# Patient Record
Sex: MALE | Race: WHITE | NOT HISPANIC OR LATINO | Employment: OTHER | URBAN - METROPOLITAN AREA
[De-identification: names, ages, dates, MRNs, and addresses within clinical notes are randomized per-mention and may not be internally consistent; named-entity substitution may affect disease eponyms.]

---

## 2020-09-05 ENCOUNTER — HOSPITAL ENCOUNTER (EMERGENCY)
Facility: HOSPITAL | Age: 65
Discharge: HOME | End: 2020-09-05
Attending: EMERGENCY MEDICINE | Admitting: EMERGENCY MEDICINE

## 2020-09-05 ENCOUNTER — APPOINTMENT (OUTPATIENT)
Dept: CT IMAGING | Facility: HOSPITAL | Age: 65
End: 2020-09-05

## 2020-09-05 VITALS
RESPIRATION RATE: 16 BRPM | DIASTOLIC BLOOD PRESSURE: 107 MMHG | HEART RATE: 82 BPM | TEMPERATURE: 97.7 F | SYSTOLIC BLOOD PRESSURE: 148 MMHG | HEIGHT: 72 IN | WEIGHT: 170 LBS | OXYGEN SATURATION: 95 % | BODY MASS INDEX: 23.03 KG/M2

## 2020-09-05 DIAGNOSIS — N20.1 RIGHT URETERAL STONE: Primary | ICD-10-CM

## 2020-09-05 LAB
ALBUMIN SERPL-MCNC: 4.4 G/DL (ref 3.5–5.2)
ALBUMIN/GLOB SERPL: 1.4 G/DL
ALP SERPL-CCNC: 69 U/L (ref 39–117)
ALT SERPL W P-5'-P-CCNC: 15 U/L (ref 1–41)
ANION GAP SERPL CALCULATED.3IONS-SCNC: 11.3 MMOL/L (ref 5–15)
AST SERPL-CCNC: 15 U/L (ref 1–40)
BACTERIA UR QL AUTO: ABNORMAL /HPF
BASOPHILS # BLD AUTO: 0.03 10*3/MM3 (ref 0–0.2)
BASOPHILS NFR BLD AUTO: 0.2 % (ref 0–1.5)
BILIRUB SERPL-MCNC: 0.6 MG/DL (ref 0–1.2)
BILIRUB UR QL STRIP: NEGATIVE
BUN SERPL-MCNC: 15 MG/DL (ref 8–23)
BUN/CREAT SERPL: 12.2 (ref 7–25)
CALCIUM SPEC-SCNC: 9.7 MG/DL (ref 8.6–10.5)
CHLORIDE SERPL-SCNC: 101 MMOL/L (ref 98–107)
CLARITY UR: CLEAR
CO2 SERPL-SCNC: 25.7 MMOL/L (ref 22–29)
COLOR UR: ABNORMAL
CREAT SERPL-MCNC: 1.23 MG/DL (ref 0.76–1.27)
DEPRECATED RDW RBC AUTO: 46.1 FL (ref 37–54)
EOSINOPHIL # BLD AUTO: 0.11 10*3/MM3 (ref 0–0.4)
EOSINOPHIL NFR BLD AUTO: 0.8 % (ref 0.3–6.2)
ERYTHROCYTE [DISTWIDTH] IN BLOOD BY AUTOMATED COUNT: 13.4 % (ref 12.3–15.4)
GFR SERPL CREATININE-BSD FRML MDRD: 59 ML/MIN/1.73
GFR SERPL CREATININE-BSD FRML MDRD: 72 ML/MIN/1.73
GLOBULIN UR ELPH-MCNC: 3.2 GM/DL
GLUCOSE SERPL-MCNC: 172 MG/DL (ref 65–99)
GLUCOSE UR STRIP-MCNC: NEGATIVE MG/DL
HCT VFR BLD AUTO: 45.2 % (ref 37.5–51)
HGB BLD-MCNC: 14.7 G/DL (ref 13–17.7)
HGB UR QL STRIP.AUTO: ABNORMAL
HYALINE CASTS UR QL AUTO: ABNORMAL /LPF
IMM GRANULOCYTES # BLD AUTO: 0.12 10*3/MM3 (ref 0–0.05)
IMM GRANULOCYTES NFR BLD AUTO: 0.9 % (ref 0–0.5)
KETONES UR QL STRIP: ABNORMAL
LEUKOCYTE ESTERASE UR QL STRIP.AUTO: ABNORMAL
LIPASE SERPL-CCNC: 28 U/L (ref 13–60)
LYMPHOCYTES # BLD AUTO: 2.15 10*3/MM3 (ref 0.7–3.1)
LYMPHOCYTES NFR BLD AUTO: 15.7 % (ref 19.6–45.3)
MCH RBC QN AUTO: 29.8 PG (ref 26.6–33)
MCHC RBC AUTO-ENTMCNC: 32.5 G/DL (ref 31.5–35.7)
MCV RBC AUTO: 91.7 FL (ref 79–97)
MONOCYTES # BLD AUTO: 0.69 10*3/MM3 (ref 0.1–0.9)
MONOCYTES NFR BLD AUTO: 5 % (ref 5–12)
NEUTROPHILS NFR BLD AUTO: 10.6 10*3/MM3 (ref 1.7–7)
NEUTROPHILS NFR BLD AUTO: 77.4 % (ref 42.7–76)
NITRITE UR QL STRIP: NEGATIVE
NRBC BLD AUTO-RTO: 0 /100 WBC (ref 0–0.2)
PH UR STRIP.AUTO: <=5 [PH] (ref 5–8)
PLATELET # BLD AUTO: 448 10*3/MM3 (ref 140–450)
PMV BLD AUTO: 9.4 FL (ref 6–12)
POTASSIUM SERPL-SCNC: 4.1 MMOL/L (ref 3.5–5.2)
PROT SERPL-MCNC: 7.6 G/DL (ref 6–8.5)
PROT UR QL STRIP: NEGATIVE
RBC # BLD AUTO: 4.93 10*6/MM3 (ref 4.14–5.8)
RBC # UR: ABNORMAL /HPF
REF LAB TEST METHOD: ABNORMAL
SODIUM SERPL-SCNC: 138 MMOL/L (ref 136–145)
SP GR UR STRIP: 1.03 (ref 1–1.03)
SQUAMOUS #/AREA URNS HPF: ABNORMAL /HPF
UROBILINOGEN UR QL STRIP: ABNORMAL
WBC # BLD AUTO: 13.7 10*3/MM3 (ref 3.4–10.8)
WBC UR QL AUTO: ABNORMAL /HPF

## 2020-09-05 PROCEDURE — 85025 COMPLETE CBC W/AUTO DIFF WBC: CPT | Performed by: EMERGENCY MEDICINE

## 2020-09-05 PROCEDURE — 81001 URINALYSIS AUTO W/SCOPE: CPT | Performed by: EMERGENCY MEDICINE

## 2020-09-05 PROCEDURE — 80053 COMPREHEN METABOLIC PANEL: CPT | Performed by: EMERGENCY MEDICINE

## 2020-09-05 PROCEDURE — 25010000002 KETOROLAC TROMETHAMINE PER 15 MG: Performed by: EMERGENCY MEDICINE

## 2020-09-05 PROCEDURE — 96374 THER/PROPH/DIAG INJ IV PUSH: CPT

## 2020-09-05 PROCEDURE — 83690 ASSAY OF LIPASE: CPT | Performed by: EMERGENCY MEDICINE

## 2020-09-05 PROCEDURE — 74176 CT ABD & PELVIS W/O CONTRAST: CPT

## 2020-09-05 PROCEDURE — 96376 TX/PRO/DX INJ SAME DRUG ADON: CPT

## 2020-09-05 PROCEDURE — 96375 TX/PRO/DX INJ NEW DRUG ADDON: CPT

## 2020-09-05 PROCEDURE — 25010000002 HYDROMORPHONE 1 MG/ML SOLUTION: Performed by: EMERGENCY MEDICINE

## 2020-09-05 PROCEDURE — 99283 EMERGENCY DEPT VISIT LOW MDM: CPT

## 2020-09-05 RX ORDER — SODIUM CHLORIDE 0.9 % (FLUSH) 0.9 %
10 SYRINGE (ML) INJECTION AS NEEDED
Status: DISCONTINUED | OUTPATIENT
Start: 2020-09-05 | End: 2020-09-05 | Stop reason: HOSPADM

## 2020-09-05 RX ORDER — ONDANSETRON 4 MG/1
4 TABLET, ORALLY DISINTEGRATING ORAL EVERY 8 HOURS PRN
Qty: 15 TABLET | Refills: 0 | Status: SHIPPED | OUTPATIENT
Start: 2020-09-05 | End: 2020-09-10

## 2020-09-05 RX ORDER — HYDROCODONE BITARTRATE AND ACETAMINOPHEN 5; 325 MG/1; MG/1
1 TABLET ORAL EVERY 6 HOURS PRN
Qty: 12 TABLET | Refills: 0 | Status: SHIPPED | OUTPATIENT
Start: 2020-09-05 | End: 2020-09-08

## 2020-09-05 RX ORDER — KETOROLAC TROMETHAMINE 15 MG/ML
15 INJECTION, SOLUTION INTRAMUSCULAR; INTRAVENOUS ONCE
Status: COMPLETED | OUTPATIENT
Start: 2020-09-05 | End: 2020-09-05

## 2020-09-05 RX ADMIN — HYDROMORPHONE HYDROCHLORIDE 1 MG: 1 INJECTION, SOLUTION INTRAMUSCULAR; INTRAVENOUS; SUBCUTANEOUS at 10:11

## 2020-09-05 RX ADMIN — KETOROLAC TROMETHAMINE 15 MG: 15 INJECTION, SOLUTION INTRAMUSCULAR; INTRAVENOUS at 10:51

## 2020-09-05 RX ADMIN — SODIUM CHLORIDE, POTASSIUM CHLORIDE, SODIUM LACTATE AND CALCIUM CHLORIDE 1000 ML: 600; 310; 30; 20 INJECTION, SOLUTION INTRAVENOUS at 10:10

## 2020-09-05 RX ADMIN — HYDROMORPHONE HYDROCHLORIDE 1 MG: 1 INJECTION, SOLUTION INTRAMUSCULAR; INTRAVENOUS; SUBCUTANEOUS at 11:14

## 2020-09-05 NOTE — ED NOTES
Pt c/o RLQ abd pain that radiates to R flank a little starting at 0630. Hx of kidney stones. Able to urinate this morning. Denies n/v/d. Pt and visitor wearing masks. This RN wearing mask and safety glasses.       Carrie Blankenship, RN  09/05/20 6289

## 2020-09-05 NOTE — ED PROVIDER NOTES
EMERGENCY DEPARTMENT ENCOUNTER    Room Number:  10/10  Date of encounter:  9/5/2020  PCP: Provider, No Known  Historian: Patient, wife      HPI:  Chief Complaint: Flank pain  A complete HPI/ROS/PMH/PSH/SH/FH are unobtainable due to: None    Context: Brad Bella is a 65 y.o. male who presents to the ED c/o flank pain.  Onset 6:30 AM.  It is sharp and constant pain that is 9/10 in intensity.  It radiates into the right lower quadrant.  This feels just like his prior to kidney stones.  No fevers.  No urinary symptoms.  No cough, congestion, chest pain, shortness of breath.      PAST MEDICAL HISTORY  Active Ambulatory Problems     Diagnosis Date Noted   • No Active Ambulatory Problems     Resolved Ambulatory Problems     Diagnosis Date Noted   • No Resolved Ambulatory Problems     No Additional Past Medical History         PAST SURGICAL HISTORY  No past surgical history on file.      FAMILY HISTORY  No family history on file.      SOCIAL HISTORY  Social History     Socioeconomic History   • Marital status:      Spouse name: Not on file   • Number of children: Not on file   • Years of education: Not on file   • Highest education level: Not on file         ALLERGIES  Patient has no known allergies.        REVIEW OF SYSTEMS  Review of Systems     All systems reviewed and negative except for those discussed in HPI.       PHYSICAL EXAM    I have reviewed the triage vital signs and nursing notes.    ED Triage Vitals   Temp Heart Rate Resp BP SpO2   09/05/20 0945 09/05/20 0946 09/05/20 0945 -- 09/05/20 0946   97.7 °F (36.5 °C) 72 18  96 %      Temp src Heart Rate Source Patient Position BP Location FiO2 (%)   09/05/20 0945 09/05/20 0946 -- -- --   Tympanic Monitor          Physical Exam  GENERAL: Uncomfortable, nontoxic  HENT: nares patent  EYES: no scleral icterus  CV: regular rhythm, regular rate  RESPIRATORY: normal effort, clear to auscultation bilaterally  ABDOMEN: soft, nontender, no CVA  tenderness  MUSCULOSKELETAL: no deformity  NEURO: alert, moves all extremities, follows commands  SKIN: warm, dry, no rash to the abdomen or back        LAB RESULTS  Recent Results (from the past 24 hour(s))   Comprehensive Metabolic Panel    Collection Time: 09/05/20 10:03 AM   Result Value Ref Range    Glucose 172 (H) 65 - 99 mg/dL    BUN 15 8 - 23 mg/dL    Creatinine 1.23 0.76 - 1.27 mg/dL    Sodium 138 136 - 145 mmol/L    Potassium 4.1 3.5 - 5.2 mmol/L    Chloride 101 98 - 107 mmol/L    CO2 25.7 22.0 - 29.0 mmol/L    Calcium 9.7 8.6 - 10.5 mg/dL    Total Protein 7.6 6.0 - 8.5 g/dL    Albumin 4.40 3.50 - 5.20 g/dL    ALT (SGPT) 15 1 - 41 U/L    AST (SGOT) 15 1 - 40 U/L    Alkaline Phosphatase 69 39 - 117 U/L    Total Bilirubin 0.6 0.0 - 1.2 mg/dL    eGFR Non African Amer 59 (L) >60 mL/min/1.73    eGFR  African Amer 72 >60 mL/min/1.73    Globulin 3.2 gm/dL    A/G Ratio 1.4 g/dL    BUN/Creatinine Ratio 12.2 7.0 - 25.0    Anion Gap 11.3 5.0 - 15.0 mmol/L   Lipase    Collection Time: 09/05/20 10:03 AM   Result Value Ref Range    Lipase 28 13 - 60 U/L   CBC Auto Differential    Collection Time: 09/05/20 10:03 AM   Result Value Ref Range    WBC 13.70 (H) 3.40 - 10.80 10*3/mm3    RBC 4.93 4.14 - 5.80 10*6/mm3    Hemoglobin 14.7 13.0 - 17.7 g/dL    Hematocrit 45.2 37.5 - 51.0 %    MCV 91.7 79.0 - 97.0 fL    MCH 29.8 26.6 - 33.0 pg    MCHC 32.5 31.5 - 35.7 g/dL    RDW 13.4 12.3 - 15.4 %    RDW-SD 46.1 37.0 - 54.0 fl    MPV 9.4 6.0 - 12.0 fL    Platelets 448 140 - 450 10*3/mm3    Neutrophil % 77.4 (H) 42.7 - 76.0 %    Lymphocyte % 15.7 (L) 19.6 - 45.3 %    Monocyte % 5.0 5.0 - 12.0 %    Eosinophil % 0.8 0.3 - 6.2 %    Basophil % 0.2 0.0 - 1.5 %    Immature Grans % 0.9 (H) 0.0 - 0.5 %    Neutrophils, Absolute 10.60 (H) 1.70 - 7.00 10*3/mm3    Lymphocytes, Absolute 2.15 0.70 - 3.10 10*3/mm3    Monocytes, Absolute 0.69 0.10 - 0.90 10*3/mm3    Eosinophils, Absolute 0.11 0.00 - 0.40 10*3/mm3    Basophils, Absolute 0.03 0.00 -  0.20 10*3/mm3    Immature Grans, Absolute 0.12 (H) 0.00 - 0.05 10*3/mm3    nRBC 0.0 0.0 - 0.2 /100 WBC   Urinalysis With Microscopic If Indicated (No Culture) - Urine, Clean Catch    Collection Time: 20 11:30 AM   Result Value Ref Range    Color, UA Dark Yellow (A) Yellow, Straw    Appearance, UA Clear Clear    pH, UA <=5.0 5.0 - 8.0    Specific Gravity, UA 1.027 1.005 - 1.030    Glucose, UA Negative Negative    Ketones, UA Trace (A) Negative    Bilirubin, UA Negative Negative    Blood, UA Small (1+) (A) Negative    Protein, UA Negative Negative    Leuk Esterase, UA Trace (A) Negative    Nitrite, UA Negative Negative    Urobilinogen, UA 0.2 E.U./dL 0.2 - 1.0 E.U./dL   Urinalysis, Microscopic Only - Urine, Clean Catch    Collection Time: 20 11:30 AM   Result Value Ref Range    RBC, UA 6-12 (A) None Seen, 0-2 /HPF    WBC, UA 13-20 (A) None Seen, 0-2 /HPF    Bacteria, UA None Seen None Seen /HPF    Squamous Epithelial Cells, UA 0-2 None Seen, 0-2 /HPF    Hyaline Casts, UA 3-6 None Seen /LPF    Methodology Automated Microscopy        Ordered the above labs and independently reviewed the results.        RADIOLOGY  Ct Abdomen Pelvis Without Contrast    Result Date: 2020  CT SCAN OF THE ABDOMEN AND PELVIS WITHOUT CONTRAST  HISTORY: Right flank pain. Kidney stones.  The CT scan was performed as an emergency procedure through the abdomen and pelvis without contrast and demonstrates the followin. There is mild right renal obstruction secondary to a 4 mm stone that appears to be at the orifice of the right ureterovesical junction as seen on image 120. There is a very tiny nonobstructing stone in the lower right kidney. There is an 8 mm nonobstructing stone in the lower left kidney. 2. The lung bases are clear. The liver, spleen, pancreas, and both adrenal glands are unremarkable without intravenous contrast. The gallbladder also appears normal. 3. There is mild generalized ectasia of the aorta without  evidence of focal aneurysm. There is no retroperitoneal lymphadenopathy. The remainder of the pelvis is unremarkable.  Radiation dose reduction techniques were utilized, including automated exposure control and exposure modulation based on body size.  This report was finalized on 9/5/2020 12:03 PM by Dr. Miguel Angel Santana M.D.        I ordered the above noted radiological studies. Reviewed by me and discussed with radiologist.  See dictation for official radiology interpretation.      PROCEDURES    Procedures      MEDICATIONS GIVEN IN ER    Medications   sodium chloride 0.9 % flush 10 mL (has no administration in time range)   HYDROmorphone (DILAUDID) injection 1 mg (1 mg Intravenous Given 9/5/20 1011)   lactated ringers bolus 1,000 mL (0 mL Intravenous Stopped 9/5/20 1130)   ketorolac (TORADOL) injection 15 mg (15 mg Intravenous Given 9/5/20 1051)   HYDROmorphone (DILAUDID) injection 1 mg (1 mg Intravenous Given 9/5/20 1114)         PROGRESS, DATA ANALYSIS, CONSULTS, AND MEDICAL DECISION MAKING    All labs have been independently reviewed by me.  All radiology studies have been reviewed by me and discussed with radiologist dictating the report.   EKG's independently viewed and interpreted by me.  Discussion below represents my analysis of pertinent findings related to patient's condition, differential diagnosis, treatment plan and final disposition.    Differential diagnosis includes but not limited to:  - hepatobiliary pathology such as cholecystitis, cholangitis, and symptomatic cholelithiasis  - Pancreatitis  - Dyspepsia  - Small bowel obstruction  - Appendicitis  - Diverticulitis  - UTI including pyelonephritis  - Ureteral stone  - Zoster  - Colitis, including infectious and ischemic      ED Course as of Sep 05 1239   Sat Sep 05, 2020   1041 Creatinine: 1.23 [TD]   1107 WBC(!): 13.70 [TD]   1226 WBC, UA(!): 13-20 [TD]   1226 Leukocytes, UA(!): Trace [TD]   1226 Nitrite, UA: Negative [TD]      ED Course User  Index  [TD] Moy Abbott II, MD     Interpreted the CT myself.  Patient has a right-sided ureteral stone.    I discussed the CT results with Dr. Santana.  Patient has a 4 mm stone in the right UVJ.    I gave the patient and wife good return precautions.    PPE: Both the patient and I wore a surgical mask throughout the entire patient encounter. I wore protective goggles.     AS OF 12:39 VITALS:    BP - 179/94  HR - 72  TEMP - 97.7 °F (36.5 °C) (Tympanic)  O2 SATS - 95%        DIAGNOSIS  Final diagnoses:   Right ureteral stone         DISPOSITION  DISCHARGE    FOLLOW-UP  [facility] Emergency Department  [address] [facility] [address] [phone]  Schedule an appointment as soon as possible for a visit in 1 day  If symptoms worsen    [facility]  [address] [facility] [address]  [phone]  Call in 3 days  if symptoms have not resolved         Medication List      New Prescriptions    HYDROcodone-acetaminophen 5-325 MG per tablet  Commonly known as:  NORCO  Take 1 tablet by mouth Every 6 (Six) Hours As Needed for Moderate Pain    for up to 3 days.     ondansetron ODT 4 MG disintegrating tablet  Commonly known as:  ZOFRAN-ODT  Place 1 tablet on the tongue Every 8 (Eight) Hours As Needed for Nausea   for up to 5 days.                   Moy Abbott II, MD  09/05/20 6538

## 2020-09-18 ENCOUNTER — APPOINTMENT (OUTPATIENT)
Dept: CT IMAGING | Facility: HOSPITAL | Age: 65
End: 2020-09-18

## 2020-09-18 ENCOUNTER — HOSPITAL ENCOUNTER (OUTPATIENT)
Facility: HOSPITAL | Age: 65
Setting detail: OBSERVATION
Discharge: HOME | End: 2020-09-18
Attending: EMERGENCY MEDICINE | Admitting: UROLOGY

## 2020-09-18 ENCOUNTER — ANESTHESIA (OUTPATIENT)
Dept: PERIOP | Facility: HOSPITAL | Age: 65
End: 2020-09-18

## 2020-09-18 ENCOUNTER — ANESTHESIA EVENT (OUTPATIENT)
Dept: PERIOP | Facility: HOSPITAL | Age: 65
End: 2020-09-18

## 2020-09-18 ENCOUNTER — APPOINTMENT (OUTPATIENT)
Dept: GENERAL RADIOLOGY | Facility: HOSPITAL | Age: 65
End: 2020-09-18

## 2020-09-18 VITALS
DIASTOLIC BLOOD PRESSURE: 91 MMHG | TEMPERATURE: 98.7 F | OXYGEN SATURATION: 94 % | WEIGHT: 169.2 LBS | RESPIRATION RATE: 16 BRPM | HEART RATE: 78 BPM | HEIGHT: 69 IN | SYSTOLIC BLOOD PRESSURE: 132 MMHG | BODY MASS INDEX: 25.06 KG/M2

## 2020-09-18 DIAGNOSIS — N20.0 KIDNEY STONE: Primary | ICD-10-CM

## 2020-09-18 DIAGNOSIS — N20.1 LEFT URETERAL STONE: ICD-10-CM

## 2020-09-18 LAB
ALBUMIN SERPL-MCNC: 4.3 G/DL (ref 3.5–5.2)
ALBUMIN/GLOB SERPL: 1.3 G/DL
ALP SERPL-CCNC: 66 U/L (ref 39–117)
ALT SERPL W P-5'-P-CCNC: 12 U/L (ref 1–41)
ANION GAP SERPL CALCULATED.3IONS-SCNC: 12.5 MMOL/L (ref 5–15)
AST SERPL-CCNC: 10 U/L (ref 1–40)
B PARAPERT DNA SPEC QL NAA+PROBE: NOT DETECTED
B PERT DNA SPEC QL NAA+PROBE: NOT DETECTED
BACTERIA UR QL AUTO: ABNORMAL /HPF
BASOPHILS # BLD AUTO: 0.06 10*3/MM3 (ref 0–0.2)
BASOPHILS NFR BLD AUTO: 0.4 % (ref 0–1.5)
BILIRUB SERPL-MCNC: 0.5 MG/DL (ref 0–1.2)
BILIRUB UR QL STRIP: NEGATIVE
BUN SERPL-MCNC: 16 MG/DL (ref 8–23)
BUN/CREAT SERPL: 12.6 (ref 7–25)
C PNEUM DNA NPH QL NAA+NON-PROBE: NOT DETECTED
CALCIUM SPEC-SCNC: 9.9 MG/DL (ref 8.6–10.5)
CHLORIDE SERPL-SCNC: 101 MMOL/L (ref 98–107)
CLARITY UR: CLEAR
CO2 SERPL-SCNC: 26.5 MMOL/L (ref 22–29)
COLOR UR: YELLOW
CREAT SERPL-MCNC: 1.27 MG/DL (ref 0.76–1.27)
DEPRECATED RDW RBC AUTO: 40.3 FL (ref 37–54)
EOSINOPHIL # BLD AUTO: 0.2 10*3/MM3 (ref 0–0.4)
EOSINOPHIL NFR BLD AUTO: 1.2 % (ref 0.3–6.2)
ERYTHROCYTE [DISTWIDTH] IN BLOOD BY AUTOMATED COUNT: 12.7 % (ref 12.3–15.4)
FLUAV H1 2009 PAND RNA NPH QL NAA+PROBE: NOT DETECTED
FLUAV H1 HA GENE NPH QL NAA+PROBE: NOT DETECTED
FLUAV H3 RNA NPH QL NAA+PROBE: NOT DETECTED
FLUAV SUBTYP SPEC NAA+PROBE: NOT DETECTED
FLUBV RNA ISLT QL NAA+PROBE: NOT DETECTED
GFR SERPL CREATININE-BSD FRML MDRD: 57 ML/MIN/1.73
GLOBULIN UR ELPH-MCNC: 3.4 GM/DL
GLUCOSE SERPL-MCNC: 128 MG/DL (ref 65–99)
GLUCOSE UR STRIP-MCNC: NEGATIVE MG/DL
HADV DNA SPEC NAA+PROBE: NOT DETECTED
HCOV 229E RNA SPEC QL NAA+PROBE: NOT DETECTED
HCOV HKU1 RNA SPEC QL NAA+PROBE: NOT DETECTED
HCOV NL63 RNA SPEC QL NAA+PROBE: NOT DETECTED
HCOV OC43 RNA SPEC QL NAA+PROBE: NOT DETECTED
HCT VFR BLD AUTO: 41 % (ref 37.5–51)
HGB BLD-MCNC: 14.1 G/DL (ref 13–17.7)
HGB UR QL STRIP.AUTO: ABNORMAL
HMPV RNA NPH QL NAA+NON-PROBE: NOT DETECTED
HPIV1 RNA SPEC QL NAA+PROBE: NOT DETECTED
HPIV2 RNA SPEC QL NAA+PROBE: NOT DETECTED
HPIV3 RNA NPH QL NAA+PROBE: NOT DETECTED
HPIV4 P GENE NPH QL NAA+PROBE: NOT DETECTED
HYALINE CASTS UR QL AUTO: ABNORMAL /LPF
IMM GRANULOCYTES # BLD AUTO: 0.18 10*3/MM3 (ref 0–0.05)
IMM GRANULOCYTES NFR BLD AUTO: 1.1 % (ref 0–0.5)
KETONES UR QL STRIP: NEGATIVE
LEUKOCYTE ESTERASE UR QL STRIP.AUTO: NEGATIVE
LIPASE SERPL-CCNC: 35 U/L (ref 13–60)
LYMPHOCYTES # BLD AUTO: 1.98 10*3/MM3 (ref 0.7–3.1)
LYMPHOCYTES NFR BLD AUTO: 11.6 % (ref 19.6–45.3)
M PNEUMO IGG SER IA-ACNC: NOT DETECTED
MCH RBC QN AUTO: 29.9 PG (ref 26.6–33)
MCHC RBC AUTO-ENTMCNC: 34.4 G/DL (ref 31.5–35.7)
MCV RBC AUTO: 86.9 FL (ref 79–97)
MONOCYTES # BLD AUTO: 1.44 10*3/MM3 (ref 0.1–0.9)
MONOCYTES NFR BLD AUTO: 8.5 % (ref 5–12)
NEUTROPHILS NFR BLD AUTO: 13.18 10*3/MM3 (ref 1.7–7)
NEUTROPHILS NFR BLD AUTO: 77.2 % (ref 42.7–76)
NITRITE UR QL STRIP: NEGATIVE
NRBC BLD AUTO-RTO: 0 /100 WBC (ref 0–0.2)
PH UR STRIP.AUTO: 5.5 [PH] (ref 5–8)
PLATELET # BLD AUTO: 517 10*3/MM3 (ref 140–450)
PMV BLD AUTO: 9.1 FL (ref 6–12)
POTASSIUM SERPL-SCNC: 4.2 MMOL/L (ref 3.5–5.2)
PROT SERPL-MCNC: 7.7 G/DL (ref 6–8.5)
PROT UR QL STRIP: NEGATIVE
RBC # BLD AUTO: 4.72 10*6/MM3 (ref 4.14–5.8)
RBC # UR: ABNORMAL /HPF
REF LAB TEST METHOD: ABNORMAL
RHINOVIRUS RNA SPEC NAA+PROBE: NOT DETECTED
RSV RNA NPH QL NAA+NON-PROBE: NOT DETECTED
SARS-COV-2 RNA NPH QL NAA+NON-PROBE: NOT DETECTED
SODIUM SERPL-SCNC: 140 MMOL/L (ref 136–145)
SP GR UR STRIP: 1.02 (ref 1–1.03)
SQUAMOUS #/AREA URNS HPF: ABNORMAL /HPF
UROBILINOGEN UR QL STRIP: ABNORMAL
WBC # BLD AUTO: 17.04 10*3/MM3 (ref 3.4–10.8)
WBC UR QL AUTO: ABNORMAL /HPF

## 2020-09-18 PROCEDURE — 25010000002 PROPOFOL 10 MG/ML EMULSION: Performed by: ANESTHESIOLOGY

## 2020-09-18 PROCEDURE — 25010000002 FENTANYL CITRATE (PF) 100 MCG/2ML SOLUTION: Performed by: ANESTHESIOLOGY

## 2020-09-18 PROCEDURE — 96375 TX/PRO/DX INJ NEW DRUG ADDON: CPT

## 2020-09-18 PROCEDURE — 0202U NFCT DS 22 TRGT SARS-COV-2: CPT | Performed by: UROLOGY

## 2020-09-18 PROCEDURE — C1769 GUIDE WIRE: HCPCS | Performed by: UROLOGY

## 2020-09-18 PROCEDURE — G0378 HOSPITAL OBSERVATION PER HR: HCPCS

## 2020-09-18 PROCEDURE — 25010000002 MIDAZOLAM PER 1 MG: Performed by: ANESTHESIOLOGY

## 2020-09-18 PROCEDURE — 74177 CT ABD & PELVIS W/CONTRAST: CPT

## 2020-09-18 PROCEDURE — 25010000002 MORPHINE PER 10 MG: Performed by: EMERGENCY MEDICINE

## 2020-09-18 PROCEDURE — 25010000002 HYDROMORPHONE PER 4 MG: Performed by: EMERGENCY MEDICINE

## 2020-09-18 PROCEDURE — 99284 EMERGENCY DEPT VISIT MOD MDM: CPT

## 2020-09-18 PROCEDURE — C2617 STENT, NON-COR, TEM W/O DEL: HCPCS | Performed by: UROLOGY

## 2020-09-18 PROCEDURE — 81001 URINALYSIS AUTO W/SCOPE: CPT | Performed by: EMERGENCY MEDICINE

## 2020-09-18 PROCEDURE — 25010000002 ONDANSETRON PER 1 MG: Performed by: EMERGENCY MEDICINE

## 2020-09-18 PROCEDURE — 25010000002 ONDANSETRON PER 1 MG: Performed by: ANESTHESIOLOGY

## 2020-09-18 PROCEDURE — 76000 FLUOROSCOPY <1 HR PHYS/QHP: CPT

## 2020-09-18 PROCEDURE — 83690 ASSAY OF LIPASE: CPT | Performed by: EMERGENCY MEDICINE

## 2020-09-18 PROCEDURE — 80053 COMPREHEN METABOLIC PANEL: CPT | Performed by: EMERGENCY MEDICINE

## 2020-09-18 PROCEDURE — 85025 COMPLETE CBC W/AUTO DIFF WBC: CPT | Performed by: EMERGENCY MEDICINE

## 2020-09-18 PROCEDURE — 74018 RADEX ABDOMEN 1 VIEW: CPT

## 2020-09-18 PROCEDURE — 25010000002 IOPAMIDOL 61 % SOLUTION: Performed by: EMERGENCY MEDICINE

## 2020-09-18 PROCEDURE — 25010000002 CEFTRIAXONE PER 250 MG: Performed by: UROLOGY

## 2020-09-18 PROCEDURE — 82365 CALCULUS SPECTROSCOPY: CPT | Performed by: UROLOGY

## 2020-09-18 PROCEDURE — 25010000002 KETOROLAC TROMETHAMINE PER 15 MG: Performed by: ANESTHESIOLOGY

## 2020-09-18 PROCEDURE — 96374 THER/PROPH/DIAG INJ IV PUSH: CPT

## 2020-09-18 DEVICE — URETERAL STENT
Type: IMPLANTABLE DEVICE | Site: URETER | Status: FUNCTIONAL
Brand: CONTOUR™

## 2020-09-18 RX ORDER — HYDROCODONE BITARTRATE AND ACETAMINOPHEN 5; 325 MG/1; MG/1
1 TABLET ORAL EVERY 6 HOURS PRN
COMMUNITY

## 2020-09-18 RX ORDER — ONDANSETRON 2 MG/ML
4 INJECTION INTRAMUSCULAR; INTRAVENOUS ONCE
Status: COMPLETED | OUTPATIENT
Start: 2020-09-18 | End: 2020-09-18

## 2020-09-18 RX ORDER — MAGNESIUM HYDROXIDE 1200 MG/15ML
LIQUID ORAL AS NEEDED
Status: DISCONTINUED | OUTPATIENT
Start: 2020-09-18 | End: 2020-09-18 | Stop reason: HOSPADM

## 2020-09-18 RX ORDER — HYDROMORPHONE HYDROCHLORIDE 1 MG/ML
0.25 INJECTION, SOLUTION INTRAMUSCULAR; INTRAVENOUS; SUBCUTANEOUS
Status: DISCONTINUED | OUTPATIENT
Start: 2020-09-18 | End: 2020-09-19 | Stop reason: HOSPADM

## 2020-09-18 RX ORDER — ONDANSETRON 4 MG/1
4 TABLET, ORALLY DISINTEGRATING ORAL EVERY 8 HOURS PRN
COMMUNITY

## 2020-09-18 RX ORDER — ONDANSETRON 2 MG/ML
INJECTION INTRAMUSCULAR; INTRAVENOUS AS NEEDED
Status: DISCONTINUED | OUTPATIENT
Start: 2020-09-18 | End: 2020-09-18 | Stop reason: SURG

## 2020-09-18 RX ORDER — PROPOFOL 10 MG/ML
VIAL (ML) INTRAVENOUS AS NEEDED
Status: DISCONTINUED | OUTPATIENT
Start: 2020-09-18 | End: 2020-09-18 | Stop reason: SURG

## 2020-09-18 RX ORDER — HYDROCODONE BITARTRATE AND ACETAMINOPHEN 5; 325 MG/1; MG/1
1 TABLET ORAL EVERY 6 HOURS PRN
Qty: 30 TABLET | Refills: 0 | Status: SHIPPED | OUTPATIENT
Start: 2020-09-18

## 2020-09-18 RX ORDER — SODIUM CHLORIDE 9 MG/ML
50 INJECTION, SOLUTION INTRAVENOUS CONTINUOUS
Status: DISCONTINUED | OUTPATIENT
Start: 2020-09-18 | End: 2020-09-19 | Stop reason: HOSPADM

## 2020-09-18 RX ORDER — MIDAZOLAM HYDROCHLORIDE 1 MG/ML
1 INJECTION INTRAMUSCULAR; INTRAVENOUS
Status: DISCONTINUED | OUTPATIENT
Start: 2020-09-18 | End: 2020-09-18 | Stop reason: HOSPADM

## 2020-09-18 RX ORDER — HYDROMORPHONE HYDROCHLORIDE 1 MG/ML
0.5 INJECTION, SOLUTION INTRAMUSCULAR; INTRAVENOUS; SUBCUTANEOUS ONCE
Status: COMPLETED | OUTPATIENT
Start: 2020-09-18 | End: 2020-09-18

## 2020-09-18 RX ORDER — SODIUM CHLORIDE 0.9 % (FLUSH) 0.9 %
3-10 SYRINGE (ML) INJECTION AS NEEDED
Status: DISCONTINUED | OUTPATIENT
Start: 2020-09-18 | End: 2020-09-18 | Stop reason: HOSPADM

## 2020-09-18 RX ORDER — NALBUPHINE HCL 10 MG/ML
2 AMPUL (ML) INJECTION EVERY 4 HOURS PRN
Status: DISCONTINUED | OUTPATIENT
Start: 2020-09-18 | End: 2020-09-19 | Stop reason: HOSPADM

## 2020-09-18 RX ORDER — SULFAMETHOXAZOLE AND TRIMETHOPRIM 800; 160 MG/1; MG/1
1 TABLET ORAL 2 TIMES DAILY
Qty: 10 TABLET | Refills: 0 | Status: SHIPPED | OUTPATIENT
Start: 2020-09-18

## 2020-09-18 RX ORDER — SODIUM CHLORIDE, SODIUM LACTATE, POTASSIUM CHLORIDE, CALCIUM CHLORIDE 600; 310; 30; 20 MG/100ML; MG/100ML; MG/100ML; MG/100ML
9 INJECTION, SOLUTION INTRAVENOUS CONTINUOUS
Status: DISCONTINUED | OUTPATIENT
Start: 2020-09-18 | End: 2020-09-19 | Stop reason: HOSPADM

## 2020-09-18 RX ORDER — LIDOCAINE HYDROCHLORIDE 20 MG/ML
INJECTION, SOLUTION INFILTRATION; PERINEURAL AS NEEDED
Status: DISCONTINUED | OUTPATIENT
Start: 2020-09-18 | End: 2020-09-18 | Stop reason: SURG

## 2020-09-18 RX ORDER — DIPHENHYDRAMINE HYDROCHLORIDE 50 MG/ML
12.5 INJECTION INTRAMUSCULAR; INTRAVENOUS
Status: DISCONTINUED | OUTPATIENT
Start: 2020-09-18 | End: 2020-09-19 | Stop reason: HOSPADM

## 2020-09-18 RX ORDER — NALBUPHINE HCL 10 MG/ML
10 AMPUL (ML) INJECTION EVERY 4 HOURS PRN
Status: DISCONTINUED | OUTPATIENT
Start: 2020-09-18 | End: 2020-09-19 | Stop reason: HOSPADM

## 2020-09-18 RX ORDER — LIDOCAINE HYDROCHLORIDE 10 MG/ML
0.5 INJECTION, SOLUTION EPIDURAL; INFILTRATION; INTRACAUDAL; PERINEURAL ONCE AS NEEDED
Status: DISCONTINUED | OUTPATIENT
Start: 2020-09-18 | End: 2020-09-18 | Stop reason: HOSPADM

## 2020-09-18 RX ORDER — PHENAZOPYRIDINE HYDROCHLORIDE 200 MG/1
200 TABLET, FILM COATED ORAL 3 TIMES DAILY PRN
Qty: 30 TABLET | Refills: 0 | Status: SHIPPED | OUTPATIENT
Start: 2020-09-18

## 2020-09-18 RX ORDER — KETOROLAC TROMETHAMINE 30 MG/ML
INJECTION, SOLUTION INTRAMUSCULAR; INTRAVENOUS AS NEEDED
Status: DISCONTINUED | OUTPATIENT
Start: 2020-09-18 | End: 2020-09-18 | Stop reason: SURG

## 2020-09-18 RX ORDER — MORPHINE SULFATE 2 MG/ML
4 INJECTION, SOLUTION INTRAMUSCULAR; INTRAVENOUS ONCE
Status: COMPLETED | OUTPATIENT
Start: 2020-09-18 | End: 2020-09-18

## 2020-09-18 RX ORDER — ACETAMINOPHEN 325 MG/1
650 TABLET ORAL ONCE AS NEEDED
Status: DISCONTINUED | OUTPATIENT
Start: 2020-09-18 | End: 2020-09-19 | Stop reason: HOSPADM

## 2020-09-18 RX ORDER — NALOXONE HCL 0.4 MG/ML
0.4 VIAL (ML) INJECTION AS NEEDED
Status: DISCONTINUED | OUTPATIENT
Start: 2020-09-18 | End: 2020-09-19 | Stop reason: HOSPADM

## 2020-09-18 RX ORDER — HYDROCODONE BITARTRATE AND ACETAMINOPHEN 5; 325 MG/1; MG/1
1 TABLET ORAL ONCE AS NEEDED
Status: DISCONTINUED | OUTPATIENT
Start: 2020-09-18 | End: 2020-09-19 | Stop reason: HOSPADM

## 2020-09-18 RX ORDER — SODIUM CHLORIDE 0.9 % (FLUSH) 0.9 %
3 SYRINGE (ML) INJECTION EVERY 12 HOURS SCHEDULED
Status: DISCONTINUED | OUTPATIENT
Start: 2020-09-18 | End: 2020-09-18 | Stop reason: HOSPADM

## 2020-09-18 RX ORDER — FENTANYL CITRATE 50 UG/ML
50 INJECTION, SOLUTION INTRAMUSCULAR; INTRAVENOUS
Status: DISCONTINUED | OUTPATIENT
Start: 2020-09-18 | End: 2020-09-19 | Stop reason: HOSPADM

## 2020-09-18 RX ORDER — FENTANYL CITRATE 50 UG/ML
INJECTION, SOLUTION INTRAMUSCULAR; INTRAVENOUS AS NEEDED
Status: DISCONTINUED | OUTPATIENT
Start: 2020-09-18 | End: 2020-09-18 | Stop reason: SURG

## 2020-09-18 RX ORDER — CEFTRIAXONE SODIUM 1 G/50ML
1 INJECTION, SOLUTION INTRAVENOUS ONCE
Status: COMPLETED | OUTPATIENT
Start: 2020-09-18 | End: 2020-09-18

## 2020-09-18 RX ADMIN — LIDOCAINE HYDROCHLORIDE 80 MG: 20 INJECTION, SOLUTION INFILTRATION; PERINEURAL at 19:26

## 2020-09-18 RX ADMIN — PROPOFOL 200 MG: 10 INJECTION, EMULSION INTRAVENOUS at 19:26

## 2020-09-18 RX ADMIN — SODIUM CHLORIDE, POTASSIUM CHLORIDE, SODIUM LACTATE AND CALCIUM CHLORIDE 500 ML: 600; 310; 30; 20 INJECTION, SOLUTION INTRAVENOUS at 09:32

## 2020-09-18 RX ADMIN — KETOROLAC TROMETHAMINE 30 MG: 30 INJECTION, SOLUTION INTRAMUSCULAR; INTRAVENOUS at 19:41

## 2020-09-18 RX ADMIN — FENTANYL CITRATE 50 MCG: 50 INJECTION INTRAMUSCULAR; INTRAVENOUS at 19:26

## 2020-09-18 RX ADMIN — FENTANYL CITRATE 50 MCG: 50 INJECTION INTRAMUSCULAR; INTRAVENOUS at 19:42

## 2020-09-18 RX ADMIN — MIDAZOLAM 2 MG: 1 INJECTION INTRAMUSCULAR; INTRAVENOUS at 17:08

## 2020-09-18 RX ADMIN — SODIUM CHLORIDE 50 ML/HR: 9 INJECTION, SOLUTION INTRAVENOUS at 17:07

## 2020-09-18 RX ADMIN — CEFTRIAXONE SODIUM 1 G: 1 INJECTION, SOLUTION INTRAVENOUS at 17:05

## 2020-09-18 RX ADMIN — MORPHINE SULFATE 4 MG: 2 INJECTION, SOLUTION INTRAMUSCULAR; INTRAVENOUS at 09:33

## 2020-09-18 RX ADMIN — HYDROMORPHONE HYDROCHLORIDE 0.5 MG: 1 INJECTION, SOLUTION INTRAMUSCULAR; INTRAVENOUS; SUBCUTANEOUS at 10:12

## 2020-09-18 RX ADMIN — ONDANSETRON HYDROCHLORIDE 4 MG: 2 SOLUTION INTRAMUSCULAR; INTRAVENOUS at 19:39

## 2020-09-18 RX ADMIN — ONDANSETRON 4 MG: 2 INJECTION INTRAMUSCULAR; INTRAVENOUS at 09:31

## 2020-09-18 RX ADMIN — IOPAMIDOL 85 ML: 612 INJECTION, SOLUTION INTRAVENOUS at 10:25

## 2020-09-18 NOTE — ANESTHESIA PREPROCEDURE EVALUATION
Anesthesia Evaluation     Patient summary reviewed and Nursing notes reviewed   NPO Solid Status: > 8 hours  NPO Liquid Status: > 8 hours           Airway   Mallampati: II  TM distance: >3 FB  Neck ROM: full  no difficulty expected  Dental - normal exam   (+) poor dentition and upper dentures        Pulmonary - normal exam   (+) a smoker Current,   Cardiovascular - normal exam        Neuro/Psych  GI/Hepatic/Renal/Endo    (+)   renal disease stones,     Musculoskeletal     Abdominal  - normal exam   Substance History      OB/GYN          Other                      Anesthesia Plan    ASA 2     general     intravenous induction     Anesthetic plan, all risks, benefits, and alternatives have been provided, discussed and informed consent has been obtained with: patient.

## 2020-09-18 NOTE — ANESTHESIA PROCEDURE NOTES
Airway  Urgency: elective    Date/Time: 9/18/2020 7:27 PM    General Information and Staff    Patient location during procedure: OR  Anesthesiologist: Poncho Goodrich MD    Indications and Patient Condition    Preoxygenated: yes      Final Airway Details  Final airway type: supraglottic airway      Successful airway: classic  Size 4    Number of attempts at approach: 1

## 2020-09-18 NOTE — ED NOTES
Nursing report ED to floor  Brad Bella  65 y.o.  male    HPI (triage note):   Chief Complaint   Patient presents with   • Flank Pain       Admitting doctor:   Alden Babin MD    Admitting diagnosis:   The encounter diagnosis was Kidney stone.    Code status:   Current Code Status     Date Active Code Status Order ID Comments User Context       Not on file    Advance Care Planning Activity          Allergies:   Patient has no known allergies.    Weight:       09/18/20  0858   Weight: 76.7 kg (169 lb 3.2 oz)       Most recent vitals:   Vitals:    09/18/20 0855 09/18/20 0858 09/18/20 1100 09/18/20 1534   BP: (!) 169/101  (!) 170/108 160/91   Pulse: 118  90 78   Resp:   15 14   Temp:    98.2 °F (36.8 °C)   TempSrc:    Tympanic   SpO2:   93% 95%   Weight:  76.7 kg (169 lb 3.2 oz)     Height:           Active LDAs/IV Access:   Lines, Drains & Airways    Active LDAs     Name:   Placement date:   Placement time:   Site:   Days:    Peripheral IV 09/18/20 0853 Left Antecubital   09/18/20    0853    Antecubital   less than 1                Labs (abnormal labs have a star):   Labs Reviewed   COMPREHENSIVE METABOLIC PANEL - Abnormal; Notable for the following components:       Result Value    Glucose 128 (*)     eGFR Non  Amer 57 (*)     All other components within normal limits    Narrative:     GFR Normal >60  Chronic Kidney Disease <60  Kidney Failure <15     URINALYSIS W/ CULTURE IF INDICATED - Abnormal; Notable for the following components:    Blood, UA Moderate (2+) (*)     All other components within normal limits   CBC WITH AUTO DIFFERENTIAL - Abnormal; Notable for the following components:    WBC 17.04 (*)     Platelets 517 (*)     Neutrophil % 77.2 (*)     Lymphocyte % 11.6 (*)     Immature Grans % 1.1 (*)     Neutrophils, Absolute 13.18 (*)     Monocytes, Absolute 1.44 (*)     Immature Grans, Absolute 0.18 (*)     All other components within normal limits   URINALYSIS, MICROSCOPIC ONLY - Abnormal;  Notable for the following components:    RBC, UA 13-20 (*)     All other components within normal limits   LIPASE - Normal   COVID PRE-OP / PRE-PROCEDURE SCREENING ORDER (NO ISOLATION)    Narrative:     The following orders were created for panel order COVID PRE-OP / PRE-PROCEDURE SCREENING ORDER (NO ISOLATION) - Swab, Nasopharynx.  Procedure                               Abnormality         Status                     ---------                               -----------         ------                     COVID-19,BIOTAP, NP/OP S...[[id number]]                                                   Please view results for these tests on the individual orders.   COVID-19,BIOTAP, NP/OP SWAB IN TRANSPORT MEDIA OR SALINE 24-36 HR TAT   CBC AND DIFFERENTIAL    Narrative:     The following orders were created for panel order CBC & Differential.  Procedure                               Abnormality         Status                     ---------                               -----------         ------                     CBC Auto Differential[[id number]]        Abnormal            Final result                 Please view results for these tests on the individual orders.       EKG:   No orders to display       Meds given in ED:   Medications   lactated ringers bolus 500 mL (0 mL Intravenous Stopped 9/18/20 1112)   morphine injection 4 mg (4 mg Intravenous Given 9/18/20 0933)   ondansetron (ZOFRAN) injection 4 mg (4 mg Intravenous Given 9/18/20 0931)   HYDROmorphone (DILAUDID) injection 0.5 mg (0.5 mg Intravenous Given 9/18/20 1012)   iopamidol (ISOVUE-300) 61 % injection 100 mL (85 mL Intravenous Given by Other 9/18/20 1025)   iopamidol (ISOVUE-300) 61 % injection  - ADS Override Pull (has no administration in time range)       Imaging results:  Ct Abdomen Pelvis With Contrast    Result Date: 9/18/2020  1. There are 2 stones within the distal left ureter measuring 6 mm and 4 mm, approximately 4 cm proximal to the UVJ. There is moderate  hydroureteronephrosis, delayed nephrogram, and moderate left perinephric and periureteral stranding. 2. Cystic foci within the pancreatic head are inconspicuous on previous noncontrasted CTs of the abdomen. A benign etiology such as ectatic side branches are suspected. Reevaluation is recommended with a pancreatic protocol CT of the abdomen in 3 months.  Discussed with Dr. Laurent.          Ambulatory status:   - ad eric    Social issues:   Social History     Socioeconomic History   • Marital status:      Spouse name: Not on file   • Number of children: Not on file   • Years of education: Not on file   • Highest education level: Not on file   Tobacco Use   • Smoking status: Current Every Day Smoker     Types: Pipe   Substance and Sexual Activity   • Alcohol use: Not Currently   • Drug use: Never   • Sexual activity: [place] RN  09/18/20 8294

## 2020-09-18 NOTE — ED TRIAGE NOTES
Left flank pain x 2 weeks intermittently pt was seen 9/5/2020 for right flank pain and dx with kidney stone. CT on 9/5/2020 shows 8mm nonobstructing stone in lower left kidney. Pt states pain has been more constant and more severe over last 2-3 days. Pt denies nausea or vomiting. Mask placed on patient in first look triage. Triage staff wearing masks.

## 2020-09-18 NOTE — PROGRESS NOTES
Clinical Pharmacy Services: Medication History    Brad Bella is a 65 y.o. male presenting to [facility] for   Chief Complaint   Patient presents with   • Flank Pain       He  has a past medical history of Kidney stone (09/05/2020).    Allergies as of 09/18/2020   • (No Known Allergies)       Medication information was obtained from: medication bottles and patient  Pharmacy and Phone Number:     Prior to Admission Medications     Prescriptions Last Dose Informant Patient Reported? Taking?    HYDROcodone-acetaminophen (NORCO) 5-325 MG per tablet 9/18/2020 Medication Bottle Yes Yes    Take 1 tablet by mouth Every 6 (Six) Hours As Needed.    ondansetron ODT (ZOFRAN-ODT) 4 MG disintegrating tablet  Medication Bottle Yes Yes    Place 4 mg on the tongue Every 8 (Eight) Hours As Needed for Nausea or Vomiting.            Medication notes:     This medication list is complete to the best of my knowledge as of 9/18/2020    Please call if questions.    Rosmery Bearden [facility]  Medication History Technician  [phone]    9/18/2020 13:03 EDT

## 2020-09-18 NOTE — H&P
[facility] Surgical History and Physical    Patient Care Team:  Provider, No Known as PCP - General    Chief complaint left flank pain    Subjective     Patient is a 65 y.o. male presents with left-sided flank pain is been on and off for over a week now.  He had a recent right ureteral stone that he passed spontaneously.  He was seen in the emergency room the other day with his right ureter stone and then presented this morning with left flank pain.  He has 2 5- 6 mm stones in the left distal ureter with hydronephrosis.  No prior history of surgical intervention with his urinary tract.  He has passed stone spontaneously in the distant past as well.  No gross hematuria.  No irritable urinary symptoms.    Review of Systems   Pertinent items are noted in HPI    Past Medical History:   Diagnosis Date   • Kidney stone 09/05/2020     History reviewed. No pertinent surgical history.  History reviewed. No pertinent family history.  Social History     Tobacco Use   • Smoking status: Current Every Day Smoker     Types: Pipe   • Smokeless tobacco: Never Used   Substance Use Topics   • Alcohol use: Not Currently   • Drug use: Never       Meds:  Medications Prior to Admission   Medication Sig Dispense Refill Last Dose   • HYDROcodone-acetaminophen (NORCO) 5-325 MG per tablet Take 1 tablet by mouth Every 6 (Six) Hours As Needed.   9/18/2020 at 0300   • ondansetron ODT (ZOFRAN-ODT) 4 MG disintegrating tablet Place 4 mg on the tongue Every 8 (Eight) Hours As Needed for Nausea or Vomiting.          Allergies:  Patient has no known allergies.    Debilities:  None    Objective     Vital Signs  Temp:  [98.2 °F (36.8 °C)-98.3 °F (36.8 °C)] 98.2 °F (36.8 °C)  Heart Rate:  [] 116  Resp:  [14-18] 16  BP: (160-170)/() 160/101    Intake/Output Summary (Last 24 hours) at 9/18/2020 1655  Last data filed at 9/18/2020 1112  Gross per 24 hour   Intake 500 ml   Output --   Net 500 ml     Flowsheet Rows      First Filed Value  "  Admission Height  175.3 cm (69\") Documented at 09/18/2020 0834   Admission Weight  76.7 kg (169 lb 3.2 oz) Documented at 09/18/2020 0858           Physical Exam:     General Appearance:    Alert, cooperative, NAD   HEENT:    No trauma, pupils reactive, hearing intact   Back:     No CVA tenderness   Lungs:     Respirations unlabored, no wheezing    Heart:    RRR, intact peripheral pulses   Abdomen:     Soft, NDNT, no masses, no guarding   :   Penis normal testes normal scrotum normal   Extremities:   No edema, no deformity   Lymphatic:   No neck or groin LAD   Skin:   No bleeding, bruising or rashes   Neuro/Psych:   Orientation intact, mood/affect pleasant, no focal findings     Results Review:    I reviewed the patient's new clinical results.  Results for orders placed or performed during the hospital encounter of 09/18/20   Comprehensive Metabolic Panel    Specimen: Blood   Result Value Ref Range    Glucose 128 (H) 65 - 99 mg/dL    BUN 16 8 - 23 mg/dL    Creatinine 1.27 0.76 - 1.27 mg/dL    Sodium 140 136 - 145 mmol/L    Potassium 4.2 3.5 - 5.2 mmol/L    Chloride 101 98 - 107 mmol/L    CO2 26.5 22.0 - 29.0 mmol/L    Calcium 9.9 8.6 - 10.5 mg/dL    Total Protein 7.7 6.0 - 8.5 g/dL    Albumin 4.30 3.50 - 5.20 g/dL    ALT (SGPT) 12 1 - 41 U/L    AST (SGOT) 10 1 - 40 U/L    Alkaline Phosphatase 66 39 - 117 U/L    Total Bilirubin 0.5 0.0 - 1.2 mg/dL    eGFR Non African Amer 57 (L) >60 mL/min/1.73    Globulin 3.4 gm/dL    A/G Ratio 1.3 g/dL    BUN/Creatinine Ratio 12.6 7.0 - 25.0    Anion Gap 12.5 5.0 - 15.0 mmol/L   Lipase    Specimen: Blood   Result Value Ref Range    Lipase 35 13 - 60 U/L   Urinalysis With Culture If Indicated - Urine, Clean Catch    Specimen: Urine, Clean Catch   Result Value Ref Range    Color, UA Yellow Yellow, Straw    Appearance, UA Clear Clear    pH, UA 5.5 5.0 - 8.0    Specific Gravity, UA 1.021 1.005 - 1.030    Glucose, UA Negative Negative    Ketones, UA Negative Negative    Bilirubin, UA " Negative Negative    Blood, UA Moderate (2+) (A) Negative    Protein, UA Negative Negative    Leuk Esterase, UA Negative Negative    Nitrite, UA Negative Negative    Urobilinogen, UA 0.2 E.U./dL 0.2 - 1.0 E.U./dL   CBC Auto Differential    Specimen: Blood   Result Value Ref Range    WBC 17.04 (H) 3.40 - 10.80 10*3/mm3    RBC 4.72 4.14 - 5.80 10*6/mm3    Hemoglobin 14.1 13.0 - 17.7 g/dL    Hematocrit 41.0 37.5 - 51.0 %    MCV 86.9 79.0 - 97.0 fL    MCH 29.9 26.6 - 33.0 pg    MCHC 34.4 31.5 - 35.7 g/dL    RDW 12.7 12.3 - 15.4 %    RDW-SD 40.3 37.0 - 54.0 fl    MPV 9.1 6.0 - 12.0 fL    Platelets 517 (H) 140 - 450 10*3/mm3    Neutrophil % 77.2 (H) 42.7 - 76.0 %    Lymphocyte % 11.6 (L) 19.6 - 45.3 %    Monocyte % 8.5 5.0 - 12.0 %    Eosinophil % 1.2 0.3 - 6.2 %    Basophil % 0.4 0.0 - 1.5 %    Immature Grans % 1.1 (H) 0.0 - 0.5 %    Neutrophils, Absolute 13.18 (H) 1.70 - 7.00 10*3/mm3    Lymphocytes, Absolute 1.98 0.70 - 3.10 10*3/mm3    Monocytes, Absolute 1.44 (H) 0.10 - 0.90 10*3/mm3    Eosinophils, Absolute 0.20 0.00 - 0.40 10*3/mm3    Basophils, Absolute 0.06 0.00 - 0.20 10*3/mm3    Immature Grans, Absolute 0.18 (H) 0.00 - 0.05 10*3/mm3    nRBC 0.0 0.0 - 0.2 /100 WBC   Urinalysis, Microscopic Only - Urine, Clean Catch    Specimen: Urine, Clean Catch   Result Value Ref Range    RBC, UA 13-20 (A) None Seen, 0-2 /HPF    WBC, UA 0-2 None Seen, 0-2 /HPF    Bacteria, UA None Seen None Seen /HPF    Squamous Epithelial Cells, UA 0-2 None Seen, 0-2 /HPF    Hyaline Casts, UA None Seen None Seen /LPF    Methodology Automated Microscopy         Assessment:  Left distal ureteral calculi    Plan:    Left ureteroscopy laser lithotripsy and stent placement    I discussed the patients findings and my recommendations with patient.   Risks, complications, outcomes and alternatives discussed with the patient at the bedside and office.    Alden Babin MD  09/18/20  16:55 EDT

## 2020-09-18 NOTE — NURSING NOTE
[facility] approximately 0515[facility] Realized that wrong test lhad been ordered. Lab notified orders corrected and  OR Desk notified as well as MD

## 2020-09-18 NOTE — OP NOTE
PREOPERATIVE DIAGNOSIS: Left ureteral calculus.    POSTOPERATIVE DIAGNOSIS: Same    PROCEDURE: Cystoscopy left ureteroscopy laser lithotripsy stone extraction stent placement.    SURGEON:  James Torres MD    ASSISTANT: None    ANESTHESIA: General    EBL: None    DRAINS: 6 Hungarian by 26 cm double-J ureteral stent.    COMPLICATIONS: None    FINDINGS: 8 mm left distal ureteral stone.    INDICATIONS FOR PROCEDURE: History of an 8 mm left distal ureteral stone with increased pain.  Patient presents today for cystoscopy left ureteroscopy laser lithotripsy stone extraction stent placement.  Risk and benefits were explained include but not limited to bleeding, infection, damage to kidney and ureter.  Patient consented to the procedure.    DESCRIPTION OF PROCEDURE: After receiving antibiotics was taken to the cystoscopy suite underwent a general anesthesia.  After adequate anesthesia was obtained he is placed in dorsal lithotomy position.  His groins prepped and draped sterile fashion.  A 21 Hungarian cystoscope introduced into the urethra and into the bladder the urethra was in normal limits prostate was normal limits once into the bladder the ureteral orifice ease were noted bilaterally there is no masses or lesions noted.  An 035 guidewire was placed into the left orifice and into the left renal pelvis which was confirmed by fluoroscopy.  I then dilated the orifice to 10 Hungarian using loop glide dilators.  I inserted the semirigid scope into the ureter and stone was encountered in the distal ureter.  There was quite a bit of edema around the stone.  I used a 365 holmium laser to fragment the stone in multiple pieces.  0 tip basket was used to remove the pieces and passed off table specimen.  21 Hungarian cystoscope was reinserted in the bladder.  I placed a 6 Hungarian by 26 cm double-J ureteral vent over the guidewire and Seldinger technique.  There was good coil noted in the left renal pelvis which was confirmed by fluoroscopy  and good coil none the bladder confirmed by cystoscopy.  The bladder was drained cystoscope was removed string was taped to the penis.  He was extubated taken recovery in satisfactory condition.  He tolerated the procedure well.

## 2020-09-18 NOTE — ED PROVIDER NOTES
Subjective   65-year-old male with past medical history of kidney stones here for chief complaint of left flank pain.  History was obtained from the patient.  The patient states that his left flank pain started 12 AM today.  Patient states that he went to bed.  He states that he woke up again at 3:03 AM and had pain medication from his previous episode of a kidney stone on 9/5.  He took 1 of these but this did not help his pain.  He states that his pain is sharp, radiates to the left lower quadrant of his abdomen.  He states that nothing makes better, nothing makes it worse.  Pt denies any urinary symptoms.  He denies any testicular pain.  He denies any fevers or chills.  Patient denies any nausea, vomiting, diarrhea.  Patient states that he does have decreased bowel movement.  He states that he has had a hernia repair as a child when he was 2 years old on the left side.          Review of Systems   All other systems reviewed and are negative.      Past Medical History:   Diagnosis Date   • Kidney stone 09/05/2020       No Known Allergies    History reviewed. No pertinent surgical history.    History reviewed. No pertinent family history.    Social History     Socioeconomic History   • Marital status:      Spouse name: Not on file   • Number of children: Not on file   • Years of education: Not on file   • Highest education level: Not on file   Tobacco Use   • Smoking status: Current Every Day Smoker     Types: Pipe   Substance and Sexual Activity   • Alcohol use: Not Currently   • Drug use: Never   • Sexual activity: Defer           Objective   Physical Exam  Vitals signs reviewed.   Constitutional:       General: He is not in acute distress.     Appearance: Normal appearance. He is normal weight. He is not ill-appearing, toxic-appearing or diaphoretic.   HENT:      Head: Normocephalic and atraumatic.      Nose: Nose normal. No congestion or rhinorrhea.      Mouth/Throat:      Mouth: Mucous membranes are  moist.      Pharynx: Oropharynx is clear. No oropharyngeal exudate or posterior oropharyngeal erythema.   Eyes:      General: No scleral icterus.        Right eye: No discharge.         Left eye: No discharge.      Extraocular Movements: Extraocular movements intact.      Conjunctiva/sclera: Conjunctivae normal.      Pupils: Pupils are equal, round, and reactive to light.   Neck:      Musculoskeletal: Normal range of motion and neck supple. No neck rigidity or muscular tenderness.   Cardiovascular:      Rate and Rhythm: Normal rate and regular rhythm.      Pulses: Normal pulses.      Heart sounds: Normal heart sounds. No murmur. No friction rub. No gallop.    Pulmonary:      Effort: Pulmonary effort is normal. No respiratory distress.      Breath sounds: Normal breath sounds. No stridor. No wheezing, rhonchi or rales.   Chest:      Chest wall: No tenderness.   Abdominal:      General: Abdomen is flat. There is no distension.      Palpations: Abdomen is soft.      Tenderness: There is abdominal tenderness (Left lower quadrant). There is left CVA tenderness. There is no right CVA tenderness, guarding or rebound.      Hernia: No hernia is present.      Comments: Previously healed hernia scar surgery repair in the left lower quadrant   Musculoskeletal: Normal range of motion.         General: No swelling, tenderness, deformity or signs of injury.      Right lower leg: No edema.      Left lower leg: No edema.   Skin:     General: Skin is warm and dry.      Capillary Refill: Capillary refill takes less than 2 seconds.      Coloration: Skin is not jaundiced or pale.      Findings: No bruising, erythema or rash.   Neurological:      General: No focal deficit present.      Mental Status: He is alert and oriented to person, place, and time.      Cranial Nerves: No cranial nerve deficit.      Sensory: No sensory deficit.      Motor: No weakness.      Gait: Gait normal.   Psychiatric:         Mood and Affect: Mood normal.          Behavior: Behavior normal.         Procedures           ED Course  ED Course as of Sep 18 1155   Fri Sep 18, 2020   1053 WBC(!): 17.04 [KS]   1053 Platelets(!): 517 [KS]   1053 Blood, UA(!): Moderate (2+) [KS]   1053 Glucose(!): 128 [KS]   1053 Patient did get a CT of the abdomen pelvis with contrast.  The radiologist called me and give me a verbal report.  Per the report, patient did have 2 kidney stones on the left side, one was 4 mm and one was 6 mm.  Patient did have moderate hydronephrosis.  In urgent this, I was also told that the patient had calcifications in his pancreas and that he needed to follow-up in 3 months for repeat contrasted CT scan.  The patient was made aware of this incidental finding.   RBC, UA(!): 13-20 [KS]      ED Course User Index  [KS] vJ Laurent MD                                           MDM  Number of Diagnoses or Management Options  Kidney stone:      Amount and/or Complexity of Data Reviewed  Clinical lab tests: ordered and reviewed  Tests in the radiology section of CPT®: ordered and reviewed    Risk of Complications, Morbidity, and/or Mortality  General comments: When I first saw the patient, the patient appeared nontoxic.  Patient was complaining of left flank pain and left lower quadrant abdominal pain.  IV was started.  Patient was made n.p.o.  The labs remarkable for elevated white count, hematuria, and mild elevation of glucose.  Given that he is got history of kidney stones and was complaining of left-sided flank pain and left lower quadrant pain, I did get a CT scan of the abdomen pelvis with contrast.  This showed that the patient had 2 kidney stones one was 4 mm and the other one was 6 mm with moderate hydronephrosis.  Patient also had calcifications of his pancreas.  He was made aware of this finding for his pancreas and was told that he will need repeat CT imaging in about 3 months time period. Pt's wife is at bedside and she told me that she will make sure that  the patient follows up with a CT scan.  Given that the patient required multiple doses of pain medication I did consult urology.  I spoke to Dr. Dominik Babin.  He wanted the patient admitted to his service and was can take him to the operating room later today.  Patient's cover test was ordered for the procedure and this is currently pending.  At this point, the patient is admitted to urology and I defer all further management to the inpatient urology team.  I did check on the patient several times while in the emergency department.  At this point, I think diverticulitis, appendicitis, pancreatitis are less likely given the CT findings.        Final diagnoses:   Kidney stone            Jv Laurent MD  09/18/20 1653

## 2020-09-19 NOTE — ANESTHESIA POSTPROCEDURE EVALUATION
Patient: Brad Bella    Procedure Summary     Date: 09/18/20 Room / Location: [facility] OR 01 / [facility] MAIN OR    Anesthesia Start: 1920 Anesthesia Stop: 2005    Procedure: LFT URETEROSCOPY LASER LITHOTRIPSY WITH STENT INSERTION (Left ) Diagnosis:     Surgeon: James Torres MD Provider: Poncho Goodrich MD    Anesthesia Type: general ASA Status: 2          Anesthesia Type: general    Vitals  Vitals Value Taken Time   /75 09/18/20 2045   Temp 36.6 °C (97.8 °F) 09/18/20 2001   Pulse 76 09/18/20 2052   Resp 16 09/18/20 2045   SpO2 95 % 09/18/20 2052   Vitals shown include unvalidated device data.        Post Anesthesia Care and Evaluation    Patient location during evaluation: bedside  Patient participation: complete - patient participated  Level of consciousness: awake and alert  Pain management: adequate  Airway patency: patent  Anesthetic complications: No anesthetic complications  PONV Status: controlled  Cardiovascular status: acceptable  Respiratory status: acceptable  Hydration status: acceptable

## 2020-09-28 LAB
CALCIUM OXALATE DIHYDRATE MFR STONE IR: 20 %
COLOR STONE: NORMAL
COM MFR STONE: 80 %
COMPN STONE: NORMAL
LABORATORY COMMENT REPORT: NORMAL
Lab: NORMAL
Lab: NORMAL
PHOTO: NORMAL
SIZE STONE: NORMAL MM
SPEC SOURCE SUBJ: NORMAL
WT STONE: 37 MG

## 2024-11-25 ENCOUNTER — PATIENT ROUNDING (BHMG ONLY) (OUTPATIENT)
Dept: FAMILY MEDICINE CLINIC | Facility: CLINIC | Age: 69
End: 2024-11-25
Payer: MEDICARE

## 2024-11-25 ENCOUNTER — OFFICE VISIT (OUTPATIENT)
Dept: FAMILY MEDICINE CLINIC | Facility: CLINIC | Age: 69
End: 2024-11-25
Payer: MEDICARE

## 2024-11-25 VITALS
HEIGHT: 71 IN | HEART RATE: 91 BPM | OXYGEN SATURATION: 95 % | BODY MASS INDEX: 22.29 KG/M2 | SYSTOLIC BLOOD PRESSURE: 138 MMHG | TEMPERATURE: 98.2 F | WEIGHT: 159.2 LBS | DIASTOLIC BLOOD PRESSURE: 70 MMHG

## 2024-11-25 DIAGNOSIS — R82.2 BILIRUBINURIA: ICD-10-CM

## 2024-11-25 DIAGNOSIS — R73.9 ELEVATED RANDOM BLOOD GLUCOSE LEVEL: ICD-10-CM

## 2024-11-25 DIAGNOSIS — R82.81 PYURIA: ICD-10-CM

## 2024-11-25 DIAGNOSIS — R81 GLUCOSURIA: ICD-10-CM

## 2024-11-25 DIAGNOSIS — Z12.5 PROSTATE CANCER SCREENING: ICD-10-CM

## 2024-11-25 DIAGNOSIS — E11.9 DIABETES MELLITUS, NEW ONSET: ICD-10-CM

## 2024-11-25 DIAGNOSIS — R19.4 CHANGE IN STOOL HABITS: Primary | ICD-10-CM

## 2024-11-25 DIAGNOSIS — R19.5 STOOL COLOR ABNORMAL: ICD-10-CM

## 2024-11-25 DIAGNOSIS — R17 JAUNDICE: ICD-10-CM

## 2024-11-25 DIAGNOSIS — Z87.898 HISTORY OF URINE COLOR CHANGES: ICD-10-CM

## 2024-11-25 LAB
BILIRUB BLD-MCNC: ABNORMAL MG/DL
CLARITY, POC: ABNORMAL
COLOR UR: ABNORMAL
EXPIRATION DATE: ABNORMAL
GLUCOSE BLDC GLUCOMTR-MCNC: 198 MG/DL (ref 70–130)
GLUCOSE UR STRIP-MCNC: ABNORMAL MG/DL
KETONES UR QL: ABNORMAL
LEUKOCYTE EST, POC: ABNORMAL
Lab: ABNORMAL
NITRITE UR-MCNC: POSITIVE MG/ML
PH UR: 6 [PH] (ref 5–8)
PROT UR STRIP-MCNC: ABNORMAL MG/DL
RBC # UR STRIP: NEGATIVE /UL
SP GR UR: 1.03 (ref 1–1.03)
UROBILINOGEN UR QL: ABNORMAL

## 2024-11-25 PROCEDURE — 81003 URINALYSIS AUTO W/O SCOPE: CPT | Performed by: FAMILY MEDICINE

## 2024-11-25 PROCEDURE — 82948 REAGENT STRIP/BLOOD GLUCOSE: CPT | Performed by: FAMILY MEDICINE

## 2024-11-25 PROCEDURE — 1126F AMNT PAIN NOTED NONE PRSNT: CPT | Performed by: FAMILY MEDICINE

## 2024-11-25 PROCEDURE — 99204 OFFICE O/P NEW MOD 45 MIN: CPT | Performed by: FAMILY MEDICINE

## 2024-11-25 NOTE — PROGRESS NOTES
How did you hear about our practice/providers? WIFE    Tell me about your visit with us. What things went well?  EVERYTHING       We're always looking for ways to make our patients' experiences even better. Do you have recommendations on ways we may improve? NO      Overall were you satisfied with your first visit to our practice?YES       Is there anything else I can do for you?  Would you like for me to have my  you?NO    You may receive a survey from [employer] [facility] via mail, text or email to provide feedback about your visit.  We ask that you please take a few minutes to complete the survey and let us know how we are doing.  If for any reason you feel unable to give us the highest rating please let me know.

## 2024-11-25 NOTE — PROGRESS NOTES
Chief Complaint  bowel problem and urinary problem    Subjective         Brad Bella presents to [facility]  History of Present Illness    69-year-old male presents today complaining of change in bowel habits, stool color and urine color.  Patient states symptoms started about 3 weeks ago after he ate cabbage and sausage, patient experienced increased gassiness and discomfort.  Patient also noticed softer stools with change in color kind of more pale and yellow mucus when wiping.  Patient also noted changing urine color, becoming darker.  His symptoms has been somewhat intermittent since.  Patient also reports about 7 pound weight loss since then, he attributes that to his low appetite.    Patient denies abdominal pain, nausea or vomiting, fevers, chills or night sweats.  Patient denies burning urination, Straining, dribbling, nocturia or incomplete voiding    Patient does have family history of pancreatic cancer in his sister and diabetes in his mother.    Patient notes that he has not been with a primary care doctor for about 3 years, patient had not had a colon cancer screening previously or his routine vaccinations.    Objective     Review of Systems     Past Medical History:   Diagnosis Date    Kidney stone 09/05/2020      No current outpatient medications on file.   Social History     Socioeconomic History    Marital status:    Tobacco Use    Smoking status: Every Day     Current packs/day: 0.25     Average packs/day: 0.3 packs/day for 55.0 years (13.8 ttl pk-yrs)     Types: Pipe, Cigarettes    Smokeless tobacco: Never   Vaping Use    Vaping status: Never Used   Substance and Sexual Activity    Alcohol use: Not Currently    Drug use: Never    Sexual activity: Yes     Partners: Female     Birth control/protection: Vasectomy      Vital Signs:   /70 (BP Location: Left arm, Patient Position: Sitting, Cuff Size: Adult)   Pulse 91   Temp 98.2 °F (36.8 °C)   Ht 180.3 cm  "(71\")   Wt 72.2 kg (159 lb 3.2 oz)   SpO2 95%   BMI 22.20 kg/m²       Physical Exam  Constitutional:       General: He is not in acute distress.     Appearance: Normal appearance. He is not ill-appearing.   Eyes:      General: Scleral icterus (mild) present.   Cardiovascular:      Rate and Rhythm: Normal rate.      Pulses: Normal pulses.      Heart sounds: No murmur heard.  Pulmonary:      Effort: Pulmonary effort is normal.      Breath sounds: Normal breath sounds.   Abdominal:      General: Bowel sounds are normal.      Palpations: Abdomen is soft. There is no hepatomegaly or splenomegaly.      Tenderness: There is no abdominal tenderness.   Musculoskeletal:      Right lower leg: No edema.      Left lower leg: No edema.   Lymphadenopathy:      Cervical: No cervical adenopathy.      Right cervical: No superficial or deep cervical adenopathy.     Left cervical: No superficial or deep cervical adenopathy.   Neurological:      Mental Status: He is alert.          Result Review :               Latest Reference Range & Units 11/25/24 11:03 11/25/24 11:20   Glucose 70 - 130 mg/dL  198 !   Color, UA Yellow, Straw, Dark Yellow, Macrina  Other !    Appearance, UA Clear  Slightly Cloudy !    Specific Gravity, UA 1.005 - 1.030  1.030    pH, UA 5.0 - 8.0  6.0    Glucose Negative mg/dL 3+ !    Ketones, UA Negative  3+ !    Blood, UA Negative  Negative    Nitrite, UA Negative  Positive !    Leukocytes, UA Negative  Small (1+) !    Protein, UA Negative mg/dL 1+ !    Bilirubin, UA Negative  Moderate (2+) !    Urobilinogen, UA Normal, 0.2 E.U./dL  0.2 E.U./dL    !: Data is abnormal     Assessment and Plan    Diagnoses and all orders for this visit:    1. Prostate cancer screening (Primary)  -     PSA Screen    2. Change in stool habits  -     CBC & Differential  -     Hepatic Function Panel  -     Basic metabolic panel  -     Lipase    3. Stool color abnormal  -     CBC & Differential  -     Hepatic Function Panel  -     Basic " metabolic panel  -     Lipase    4. History of urine color changes  -     POCT urinalysis dipstick, automated    5. Bilirubinuria    6. Glucosuria  -     POC Glucose    7. Pyuria  -     Urinalysis With Culture If Indicated -    8. Elevated random blood glucose level  -     Hemoglobin A1c      69-year-old male with acute bowel habit change, pale stools with yellow mucus secretions, yellow urine, scleral icterus, poor appetite and weight loss, has positive family history of pancreatic cancer.  UA today shows glucosuria, ketonuria, pyuria with positive nitrate and bilirubinuria.  POA random glucose 198.    The patient's history is very concerning for neoplastic process and possible diabetes.  I will check A1c, urine culture, CBC, LFT, BMP, lipase and PSA.  I was unable to add GGT due to insurance coverage  Would likely proceed with CT abdomen and pelvis  Further recommendations to follow.        Follow Up   No follow-ups on file.  Patient was given instructions and counseling regarding his condition or for health maintenance advice. Please see specific information pulled into the AVS if appropriate.

## 2024-11-26 ENCOUNTER — HOSPITAL ENCOUNTER (OUTPATIENT)
Dept: CT IMAGING | Facility: HOSPITAL | Age: 69
Discharge: HOME | End: 2024-11-26
Admitting: FAMILY MEDICINE
Payer: MEDICARE

## 2024-11-26 ENCOUNTER — OFFICE VISIT (OUTPATIENT)
Dept: FAMILY MEDICINE CLINIC | Facility: CLINIC | Age: 69
End: 2024-11-26
Payer: MEDICARE

## 2024-11-26 VITALS
RESPIRATION RATE: 16 BRPM | HEIGHT: 71 IN | BODY MASS INDEX: 22.26 KG/M2 | WEIGHT: 159 LBS | HEART RATE: 90 BPM | SYSTOLIC BLOOD PRESSURE: 112 MMHG | TEMPERATURE: 98 F | OXYGEN SATURATION: 93 % | DIASTOLIC BLOOD PRESSURE: 60 MMHG

## 2024-11-26 DIAGNOSIS — R17 JAUNDICE: ICD-10-CM

## 2024-11-26 DIAGNOSIS — E11.9 DIABETES MELLITUS, NEW ONSET: Primary | ICD-10-CM

## 2024-11-26 DIAGNOSIS — K83.1 OBSTRUCTIVE JAUNDICE: ICD-10-CM

## 2024-11-26 DIAGNOSIS — K83.1 BILIARY OBSTRUCTION: ICD-10-CM

## 2024-11-26 DIAGNOSIS — E11.9 DIABETES MELLITUS, NEW ONSET: ICD-10-CM

## 2024-11-26 DIAGNOSIS — K86.89 PANCREATIC MASS: ICD-10-CM

## 2024-11-26 LAB
ALBUMIN SERPL-MCNC: 4.6 G/DL (ref 3.9–4.9)
ALP SERPL-CCNC: 364 IU/L (ref 44–121)
ALT SERPL-CCNC: 374 IU/L (ref 0–44)
AST SERPL-CCNC: 176 IU/L (ref 0–40)
BASOPHILS # BLD AUTO: 0 X10E3/UL (ref 0–0.2)
BASOPHILS NFR BLD AUTO: 0 %
BILIRUB DIRECT SERPL-MCNC: 4.82 MG/DL (ref 0–0.4)
BILIRUB SERPL-MCNC: 6.8 MG/DL (ref 0–1.2)
BUN SERPL-MCNC: 6 MG/DL (ref 8–27)
BUN/CREAT SERPL: 8 (ref 10–24)
CALCIUM SERPL-MCNC: 10 MG/DL (ref 8.6–10.2)
CHLORIDE SERPL-SCNC: 99 MMOL/L (ref 96–106)
CO2 SERPL-SCNC: 24 MMOL/L (ref 20–29)
CREAT SERPL-MCNC: 0.76 MG/DL (ref 0.76–1.27)
EGFRCR SERPLBLD CKD-EPI 2021: 97 ML/MIN/1.73
EOSINOPHIL # BLD AUTO: 0.1 X10E3/UL (ref 0–0.4)
EOSINOPHIL NFR BLD AUTO: 2 %
ERYTHROCYTE [DISTWIDTH] IN BLOOD BY AUTOMATED COUNT: 13.4 % (ref 11.6–15.4)
GLUCOSE SERPL-MCNC: 207 MG/DL (ref 70–99)
HBA1C MFR BLD: 8.1 % (ref 4.8–5.6)
HCT VFR BLD AUTO: 44.1 % (ref 37.5–51)
HGB BLD-MCNC: 14.2 G/DL (ref 13–17.7)
IMM GRANULOCYTES # BLD AUTO: 0 X10E3/UL (ref 0–0.1)
IMM GRANULOCYTES NFR BLD AUTO: 0 %
LIPASE SERPL-CCNC: 93 U/L (ref 13–78)
LYMPHOCYTES # BLD AUTO: 1.5 X10E3/UL (ref 0.7–3.1)
LYMPHOCYTES NFR BLD AUTO: 20 %
MCH RBC QN AUTO: 29.8 PG (ref 26.6–33)
MCHC RBC AUTO-ENTMCNC: 32.2 G/DL (ref 31.5–35.7)
MCV RBC AUTO: 93 FL (ref 79–97)
MONOCYTES # BLD AUTO: 0.7 X10E3/UL (ref 0.1–0.9)
MONOCYTES NFR BLD AUTO: 10 %
NEUTROPHILS # BLD AUTO: 5.2 X10E3/UL (ref 1.4–7)
NEUTROPHILS NFR BLD AUTO: 68 %
PLATELET # BLD AUTO: 435 X10E3/UL (ref 150–450)
POTASSIUM SERPL-SCNC: 4.6 MMOL/L (ref 3.5–5.2)
PROT SERPL-MCNC: 7.1 G/DL (ref 6–8.5)
PSA SERPL-MCNC: 0.6 NG/ML (ref 0–4)
RBC # BLD AUTO: 4.77 X10E6/UL (ref 4.14–5.8)
SODIUM SERPL-SCNC: 138 MMOL/L (ref 134–144)
WBC # BLD AUTO: 7.6 X10E3/UL (ref 3.4–10.8)

## 2024-11-26 PROCEDURE — 3052F HG A1C>EQUAL 8.0%<EQUAL 9.0%: CPT | Performed by: FAMILY MEDICINE

## 2024-11-26 PROCEDURE — 99214 OFFICE O/P EST MOD 30 MIN: CPT | Performed by: FAMILY MEDICINE

## 2024-11-26 PROCEDURE — G2211 COMPLEX E/M VISIT ADD ON: HCPCS | Performed by: FAMILY MEDICINE

## 2024-11-26 PROCEDURE — 25510000001 IOPAMIDOL 61 % SOLUTION: Performed by: FAMILY MEDICINE

## 2024-11-26 PROCEDURE — 1126F AMNT PAIN NOTED NONE PRSNT: CPT | Performed by: FAMILY MEDICINE

## 2024-11-26 PROCEDURE — 74177 CT ABD & PELVIS W/CONTRAST: CPT

## 2024-11-26 RX ORDER — IOPAMIDOL 612 MG/ML
100 INJECTION, SOLUTION INTRAVASCULAR
Status: COMPLETED | OUTPATIENT
Start: 2024-11-26 | End: 2024-11-26

## 2024-11-26 RX ORDER — INSULIN DEGLUDEC 200 U/ML
10 INJECTION, SOLUTION SUBCUTANEOUS NIGHTLY
Qty: 3 ML | Refills: 2 | COMMUNITY
Start: 2024-11-26

## 2024-11-26 RX ORDER — BLOOD-GLUCOSE METER
EACH MISCELLANEOUS
Qty: 1 EACH | Refills: 0 | Status: SHIPPED | OUTPATIENT
Start: 2024-11-26

## 2024-11-26 RX ORDER — LANCETS 30 GAUGE
1 EACH MISCELLANEOUS
Qty: 100 EACH | Refills: 2 | Status: SHIPPED | OUTPATIENT
Start: 2024-11-26

## 2024-11-26 RX ORDER — DEXTROSE 5 G
15 TABLET,CHEWABLE ORAL AS NEEDED
Qty: 50 TABLET | Refills: 2 | Status: SHIPPED | OUTPATIENT
Start: 2024-11-26 | End: 2025-11-26

## 2024-11-26 RX ADMIN — IOPAMIDOL 85 ML: 612 INJECTION, SOLUTION INTRAVENOUS at 15:35

## 2024-11-26 NOTE — PATIENT INSTRUCTIONS
Checking blood sugar every morning before breakfast  Check your blood sugar if you experience any low blood sugar symptoms.    If blood sugars less than 80, take 15 g of sugar and recheck after 15 minutes and call the office.  If your blood sugar less than 55, take 15 g of sugar and call ambulance

## 2024-11-26 NOTE — ADDENDUM NOTE
Addended by: ANGELI MORIN on: 11/26/2024 04:58 PM     Modules accepted: Orders, Level of Service

## 2024-11-29 LAB
APPEARANCE UR: CLEAR
BACTERIA #/AREA URNS HPF: ABNORMAL /[HPF]
BACTERIA UR CULT: NORMAL
BACTERIA UR CULT: NORMAL
BILIRUB UR QL STRIP: NEGATIVE
CASTS URNS QL MICRO: ABNORMAL /LPF
COLOR UR: YELLOW
CRYSTALS URNS MICRO: ABNORMAL
EPI CELLS #/AREA URNS HPF: ABNORMAL /HPF (ref 0–10)
GLUCOSE UR QL STRIP: ABNORMAL
HGB UR QL STRIP: NEGATIVE
KETONES UR QL STRIP: ABNORMAL
LEUKOCYTE ESTERASE UR QL STRIP: ABNORMAL
MICRO URNS: ABNORMAL
NITRITE UR QL STRIP: POSITIVE
PH UR STRIP: 5.5 [PH] (ref 5–7.5)
PROT UR QL STRIP: ABNORMAL
RBC #/AREA URNS HPF: ABNORMAL /HPF (ref 0–2)
SP GR UR STRIP: >=1.03 (ref 1–1.03)
UNIDENT CRYS URNS QL MICRO: PRESENT
URINALYSIS REFLEX: ABNORMAL
UROBILINOGEN UR STRIP-MCNC: 0.2 MG/DL (ref 0.2–1)
WBC #/AREA URNS HPF: ABNORMAL /HPF (ref 0–5)

## 2024-12-02 NOTE — PROGRESS NOTES
.     REASON FOR CONSULTATION:   Suspected pancreatic cancer  Provide an opinion on any further workup or treatment                             REQUESTING PHYSICIAN: Holland Salinas MD  RECORDS OBTAINED:  Records of the patient's history including those obtained from the referring provider were reviewed and summarized in detail.    HISTORY OF PRESENT ILLNESS:  The patient is a 69 y.o. year old male  who is here for follow-up with the above-mentioned history.    Reviewed PCP note from 11/26/2024: New onset diabetes, elevated LFTs, bilirubin, and alkaline phosphatase.  CT AP with contrast, 11/26/2024, from 9/18/2020: Pancreatic head mass, 2.7 x 2.2 x 2.1 cm.  Abnormal intrahepatic and extrahepatic biliary ductal dilation compatible with biliary obstruction.  No evidence of liver metastasis.  Scattered small mesenteric and periaortic nodes, not significant by size criteria.  Lung bases: New pulmonary nodule, LLL, 8 mm.  Stable LLL 4 mm nodule and stable RML pleural-based 3 mm nodule    He feels fine.  No neuropathy.  Active.  No limitations to activities.  [place]  States he replaced the toilet recently.  Lost 10 pounds over the past month but attributes this to about a week of diarrhea which is subsequently cleared and also altering his diet due to the new diagnosis of diabetes.    Past Medical History:   Diagnosis Date    Diabetes mellitus     Kidney stone 09/05/2020     Past Surgical History:   Procedure Laterality Date    URETEROSCOPY LASER LITHOTRIPSY WITH STENT INSERTION Left 9/18/2020    Procedure: LFT URETEROSCOPY LASER LITHOTRIPSY WITH STENT INSERTION;  Surgeon: James Torres MD;  Location: [facility];  Service: Urology;  Laterality: Left;       MEDICATIONS    Current Outpatient Medications:     Blood Glucose Monitoring Suppl (ONE TOUCH ULTRA 2) w/Device kit, 1 time daily before breakfast, Disp: 1 each, Rfl: 0    glucose (B-D GLUCOSE) 5 g chewable tablet, Chew 3 tablets As  Needed for Low Blood Sugar. Take 3 tablets for low blood sugar less than 80, Disp: 50 tablet, Rfl: 2    glucose blood test strip, 1 times daily before breakfast, Disp: 100 each, Rfl: 3    Insulin Degludec (Tresiba FlexTouch) 200 UNIT/ML solution pen-injector pen injection, Inject 10 Units under the skin into the appropriate area as directed Every Night., Disp: 3 mL, Rfl: 2    Insulin Pen Needle 32G X 5 MM misc, Use 1 each every night at bedtime., Disp: 100 each, Rfl: 1    OneTouch UltraSoft 2 Lancets misc, Use 1 each Every Morning Before Breakfast., Disp: 100 each, Rfl: 2    ALLERGIES:   No Known Allergies    SOCIAL HISTORY:       Social History     Socioeconomic History    Marital status:      Spouse name: Debbie   Tobacco Use    Smoking status: Every Day     Current packs/day: 0.25     Average packs/day: 0.3 packs/day for 55.0 years (13.8 ttl pk-yrs)     Types: Pipe, Cigarettes    Smokeless tobacco: Never   Vaping Use    Vaping status: Never Used   Substance and Sexual Activity    Alcohol use: Not Currently    Drug use: Never    Sexual activity: Yes     Partners: Female     Birth control/protection: Vasectomy         FAMILY HISTORY:  Family History   Problem Relation Age of Onset    Diabetes Mother     Heart attack Father     Pancreatic cancer Sister 69       REVIEW OF SYSTEMS:  Review of Systems   Constitutional:  Negative for activity change.   HENT:  Negative for nosebleeds and trouble swallowing.    Respiratory:  Negative for shortness of breath and wheezing.    Cardiovascular:  Negative for chest pain and palpitations.   Gastrointestinal:  Negative for constipation, diarrhea and nausea.   Genitourinary:  Negative for dysuria and hematuria.   Musculoskeletal:  Negative for arthralgias and myalgias.   Neurological:  Negative for seizures and syncope.   Hematological:  Negative for adenopathy. Does not bruise/bleed easily.   Psychiatric/Behavioral:  Negative for confusion.               Vitals:    12/03/24  "1532   BP: 149/76   Pulse: 80   Resp: 16   Temp: 98.6 °F (37 °C)   TempSrc: Oral   SpO2: 94%   Weight: 70.2 kg (154 lb 12.8 oz)   Height: 180.3 cm (70.98\")   PainSc: 0-No pain         12/3/2024     3:33 PM   Current Status   ECOG score 0      PHYSICAL EXAM:        CONSTITUTIONAL:  Vital signs reviewed.  No distress, looks comfortable.  EYES:  Conjunctiva and lids unremarkable.  PERRLA  EARS,NOSE,MOUTH,THROAT:  Ears and nose appear unremarkable.  Lips, teeth, gums appear unremarkable.  RESPIRATORY:  Normal respiratory effort.  Lungs clear to auscultation bilaterally.  CARDIOVASCULAR:  Normal S1, S2.  No murmurs rubs or gallops.  No significant lower extremity edema.  GASTROINTESTINAL: Abdomen appears unremarkable.  Nontender.  No hepatomegaly.  No splenomegaly.  LYMPHATIC:  No cervical, supraclavicular, axillary lymphadenopathy.  SKIN:  Warm.  No rashes.  PSYCHIATRIC:  Normal judgment and insight.  Normal mood and affect.          RECENT LABS:        WBC   Date Value Ref Range Status   12/03/2024 9.67 3.40 - 10.80 10*3/mm3 Final   11/25/2024 7.6 3.4 - 10.8 x10E3/uL Final     Comment:     **Effective December 2, 2024 profile [id number] WBC will be made**    non-orderable as a stand-alone order code.     09/18/2020 17.04 (H) 3.40 - 10.80 10*3/mm3 Final   09/05/2020 13.70 (H) 3.40 - 10.80 10*3/mm3 Final     Hemoglobin   Date Value Ref Range Status   12/03/2024 13.1 13.0 - 17.7 g/dL Final   11/25/2024 14.2 13.0 - 17.7 g/dL Final   09/18/2020 14.1 13.0 - 17.7 g/dL Final   09/05/2020 14.7 13.0 - 17.7 g/dL Final     Platelets   Date Value Ref Range Status   12/03/2024 459 (H) 140 - 450 10*3/mm3 Final   11/25/2024 435 150 - 450 x10E3/uL Final   09/18/2020 517 (H) 140 - 450 10*3/mm3 Final   09/05/2020 448 140 - 450 10*3/mm3 Final       Assessment & Plan   Pancreatic adenocarcinoma  - [facility]- multi cancer panel - Miscellaneous Test        Brad Bella   *Suspected pancreatic cancer  Reviewed PCP note from 11/26/2024: New onset " diabetes, elevated LFTs, bilirubin, and alkaline phosphatase.  CT AP with contrast, 11/26/2024, from 9/18/2020: Pancreatic head mass, 2.7 x 2.2 x 2.1 cm.  Abnormal intrahepatic and extrahepatic biliary ductal dilation compatible with biliary obstruction.  No evidence of liver metastasis.  Scattered small mesenteric and periaortic nodes, not significant by size criteria.  Lung bases: New pulmonary nodule, LLL, 8 mm.  Stable LLL 4 mm nodule and stable RML pleural-based 3 mm nodule  Neoadjuvant FOLFIRINOX versus Gemzar Abraxane (without XRT)  SWOG  compare these 2 regimens, 12 weeks preoperative and 12 weeks postoperative treatment and 102 patients with potentially resectable pancreatic cancer.  Gemzar Abraxane had better results  Up-to-date recommends neoadjuvant modified FOLFIRINOX followed by chemo XRT if patient is felt to be able to tolerate  Up-to-date authors consider upfront pancreatectomy followed by adjuvant chemotherapy to be an equally accepted approach as compared to upfront chemo XRT followed by pancreatectomy  12/3/2024 (initial consult): Bilirubin up to 19.8 from 6.8 on 11/25/2024.  Discussed with Dr. Monse Gutierrez who offered admission to the patient but patient declined.  He is going to do an ERCP for the patient tomorrow as an outpatient to hopefully obtain a tissue diagnosis and perform biliary stenting to relieve the hyperbilirubinemia.  He will also perform a CT of the chest (chest imaging has not yet been done), and repeat abdominal imaging, three-phase, to assess for vessel involvement to assess for resectability.  He request we arrange the PET scan at [facility] which we will do.  Patient denies neuropathy.  I discussed FOLFIRINOX with the patient as I expect that we will be the regimen I recommend for him.  I reviewed side effects and schedule.  I told him I would defer to Dr. Gutierrez sequencing of surgery and chemo and defer to Dr. Gutierrez if radiation will be recommended  I explained  to patient if metastatic disease is found, this is not curable.  Fortunately, no clear evidence of metastatic disease on CT of the abdomen and pelvis.  Patient states if at some point this is determined to not be curable, he will likely want chemo, but wants the main focus to be on quality of life    *Hyperbilirubinemia  11/25/2024: Total bilirubin 6.8.  Direct bilirubin 4.8.    *Family history of pancreatic cancer (sister).  Genetic testing ordered.    *New onset diabetes, likely due to pancreatic cancer  Was due for outpatient endocrinology consult 12/4/ 24, range through PCP.  This will need to be rescheduled due to the urgent need for biliary stenting    Plan  CT chest and CT triple phase abdomen through Dr. Gutierrez tomorrow during ERCP with hopefully biliary stenting and tissue diagnosis  PET scan and appointment with me a few days later  Labs today: CMP, direct bilirubin, CA 19-9, genetic testing    106 minutes.  Total time.  Same day.    I am following him longitudinally

## 2024-12-03 ENCOUNTER — LAB (OUTPATIENT)
Dept: OTHER | Facility: HOSPITAL | Age: 69
End: 2024-12-03
Payer: MEDICARE

## 2024-12-03 ENCOUNTER — TELEPHONE (OUTPATIENT)
Dept: ONCOLOGY | Facility: CLINIC | Age: 69
End: 2024-12-03
Payer: MEDICARE

## 2024-12-03 ENCOUNTER — CONSULT (OUTPATIENT)
Dept: ONCOLOGY | Facility: CLINIC | Age: 69
End: 2024-12-03
Payer: MEDICARE

## 2024-12-03 VITALS
WEIGHT: 154.8 LBS | SYSTOLIC BLOOD PRESSURE: 149 MMHG | HEART RATE: 80 BPM | OXYGEN SATURATION: 94 % | HEIGHT: 71 IN | TEMPERATURE: 98.6 F | RESPIRATION RATE: 16 BRPM | DIASTOLIC BLOOD PRESSURE: 76 MMHG | BODY MASS INDEX: 21.67 KG/M2

## 2024-12-03 DIAGNOSIS — C25.9 PANCREATIC ADENOCARCINOMA: ICD-10-CM

## 2024-12-03 DIAGNOSIS — R93.5 ABNORMAL FINDINGS ON DIAGNOSTIC IMAGING OF OTHER ABDOMINAL REGIONS, INCLUDING RETROPERITONEUM: ICD-10-CM

## 2024-12-03 DIAGNOSIS — C25.9 PANCREATIC ADENOCARCINOMA: Primary | ICD-10-CM

## 2024-12-03 DIAGNOSIS — K86.89 PANCREATIC MASS: Primary | ICD-10-CM

## 2024-12-03 LAB
ALBUMIN SERPL-MCNC: 4 G/DL (ref 3.5–5.2)
ALBUMIN/GLOB SERPL: 1.4 G/DL
ALP SERPL-CCNC: 388 U/L (ref 39–117)
ALT SERPL W P-5'-P-CCNC: 125 U/L (ref 1–41)
ANION GAP SERPL CALCULATED.3IONS-SCNC: 10.6 MMOL/L (ref 5–15)
AST SERPL-CCNC: 64 U/L (ref 1–40)
BASOPHILS # BLD AUTO: 0.03 10*3/MM3 (ref 0–0.2)
BASOPHILS NFR BLD AUTO: 0.3 % (ref 0–1.5)
BILIRUB CONJ SERPL-MCNC: >10 MG/DL (ref 0–0.3)
BILIRUB SERPL-MCNC: 19.8 MG/DL (ref 0–1.2)
BUN SERPL-MCNC: 13 MG/DL (ref 8–23)
BUN/CREAT SERPL: 22.4 (ref 7–25)
CALCIUM SPEC-SCNC: 9.7 MG/DL (ref 8.6–10.5)
CANCER AG19-9 SERPL-ACNC: 1355 U/ML
CHLORIDE SERPL-SCNC: 94 MMOL/L (ref 98–107)
CO2 SERPL-SCNC: 27.4 MMOL/L (ref 22–29)
CREAT SERPL-MCNC: 0.58 MG/DL (ref 0.76–1.27)
DEPRECATED RDW RBC AUTO: 49.4 FL (ref 37–54)
EGFRCR SERPLBLD CKD-EPI 2021: 105.6 ML/MIN/1.73
EOSINOPHIL # BLD AUTO: 0.14 10*3/MM3 (ref 0–0.4)
EOSINOPHIL NFR BLD AUTO: 1.4 % (ref 0.3–6.2)
ERYTHROCYTE [DISTWIDTH] IN BLOOD BY AUTOMATED COUNT: 15.8 % (ref 12.3–15.4)
GLOBULIN UR ELPH-MCNC: 2.9 GM/DL
GLUCOSE SERPL-MCNC: 142 MG/DL (ref 65–99)
HCT VFR BLD AUTO: 37.5 % (ref 37.5–51)
HGB BLD-MCNC: 13.1 G/DL (ref 13–17.7)
IMM GRANULOCYTES # BLD AUTO: 0.19 10*3/MM3 (ref 0–0.05)
IMM GRANULOCYTES NFR BLD AUTO: 2 % (ref 0–0.5)
LYMPHOCYTES # BLD AUTO: 1.52 10*3/MM3 (ref 0.7–3.1)
LYMPHOCYTES NFR BLD AUTO: 15.7 % (ref 19.6–45.3)
MCH RBC QN AUTO: 29.9 PG (ref 26.6–33)
MCHC RBC AUTO-ENTMCNC: 34.9 G/DL (ref 31.5–35.7)
MCV RBC AUTO: 85.6 FL (ref 79–97)
MONOCYTES # BLD AUTO: 1.04 10*3/MM3 (ref 0.1–0.9)
MONOCYTES NFR BLD AUTO: 10.8 % (ref 5–12)
NEUTROPHILS NFR BLD AUTO: 6.75 10*3/MM3 (ref 1.7–7)
NEUTROPHILS NFR BLD AUTO: 69.8 % (ref 42.7–76)
NRBC BLD AUTO-RTO: 0 /100 WBC (ref 0–0.2)
PLATELET # BLD AUTO: 459 10*3/MM3 (ref 140–450)
PMV BLD AUTO: 11.5 FL (ref 6–12)
POTASSIUM SERPL-SCNC: 3.6 MMOL/L (ref 3.5–5.2)
PROT SERPL-MCNC: 6.9 G/DL (ref 6–8.5)
RBC # BLD AUTO: 4.38 10*6/MM3 (ref 4.14–5.8)
SODIUM SERPL-SCNC: 132 MMOL/L (ref 136–145)
WBC NRBC COR # BLD AUTO: 9.67 10*3/MM3 (ref 3.4–10.8)

## 2024-12-03 PROCEDURE — 86301 IMMUNOASSAY TUMOR CA 19-9: CPT | Performed by: INTERNAL MEDICINE

## 2024-12-03 PROCEDURE — 85025 COMPLETE CBC W/AUTO DIFF WBC: CPT | Performed by: INTERNAL MEDICINE

## 2024-12-03 PROCEDURE — 80053 COMPREHEN METABOLIC PANEL: CPT | Performed by: INTERNAL MEDICINE

## 2024-12-03 PROCEDURE — 36415 COLL VENOUS BLD VENIPUNCTURE: CPT

## 2024-12-03 PROCEDURE — 82248 BILIRUBIN DIRECT: CPT | Performed by: INTERNAL MEDICINE

## 2024-12-03 NOTE — TELEPHONE ENCOUNTER
----- Message from Reed Castillo sent at 12/2/2024  5:47 PM EST -----  Renato Gunn,    He is going to need these labs tomorrow:  CMP, direct bilirubin, CA 19-9, genetic testing    Thanks!   Topical Clindamycin Counseling: Patient counseled that this medication may cause skin irritation or allergic reactions.  In the event of skin irritation, the patient was advised to reduce the amount of the drug applied or use it less frequently.   The patient verbalized understanding of the proper use and possible adverse effects of clindamycin.  All of the patient's questions and concerns were addressed.

## 2024-12-04 ENCOUNTER — TELEPHONE (OUTPATIENT)
Dept: ONCOLOGY | Facility: CLINIC | Age: 69
End: 2024-12-04
Payer: MEDICARE

## 2024-12-04 DIAGNOSIS — C25.9 PANCREATIC ADENOCARCINOMA: Primary | ICD-10-CM

## 2024-12-04 NOTE — TELEPHONE ENCOUNTER
----- Message from Reed Castillo sent at 12/4/2024 12:31 PM EST -----  Regarding: RE: Schedule  Please schedule him with me for a 2 unit at 3 PM on Friday, December 13.  2 units.  2 units.  2 units.  The day he sees me he will need a CMP and direct bilirubin  ----- Message -----  From: Pete Leija RegSched Rep  Sent: 12/4/2024  10:18 AM EST  To: Reed Castillo II, MD  Subject: Schedule                                         The PET scan is scheduled for Tuesday 12/10 -  Can you look at your karin and tell me if you would want to see him Friday 12/13 at 12:00 and if this would be adequate time for results?  Thanks Pete

## 2024-12-05 ENCOUNTER — PATIENT ROUNDING (BHMG ONLY) (OUTPATIENT)
Dept: FAMILY MEDICINE CLINIC | Facility: CLINIC | Age: 69
End: 2024-12-05
Payer: MEDICARE

## 2024-12-05 NOTE — PROGRESS NOTES
Sent Patient following in [employer] Message:  My name is Dax Jacob      I am the Practice Manager with   [facility] [place]  [address]  [place] [address] [phone].    And    [facility] [address],  [place], [place] [id number] [phone]    I am messaging to officially welcome you to our practice and ask about your recent visit.     How did you hear about our practice/providers?    Tell me about your visit with us. What things went well?         We're always looking for ways to make our patients' experiences even better. Do you have recommendations on ways we may improve?       Overall were you satisfied with your first visit to our practice?        Is there anything else I can do for you?     You may receive a survey from [employer] via text or email to provide feedback about your visit.  We ask that you please take a few minutes to complete the survey and let us know how we are doing.  If for any reason you feel unable to give us the highest rating please let me know.      Thank you, and have a great day.

## 2024-12-09 ENCOUNTER — TELEPHONE (OUTPATIENT)
Dept: ENDOCRINOLOGY | Age: 69
End: 2024-12-09

## 2024-12-09 ENCOUNTER — OFFICE VISIT (OUTPATIENT)
Dept: ENDOCRINOLOGY | Age: 69
End: 2024-12-09
Payer: MEDICARE

## 2024-12-09 VITALS
HEIGHT: 71 IN | HEART RATE: 89 BPM | SYSTOLIC BLOOD PRESSURE: 124 MMHG | TEMPERATURE: 98.1 F | WEIGHT: 153 LBS | BODY MASS INDEX: 21.42 KG/M2 | OXYGEN SATURATION: 100 % | DIASTOLIC BLOOD PRESSURE: 66 MMHG

## 2024-12-09 DIAGNOSIS — E11.65 TYPE 2 DIABETES MELLITUS WITH HYPERGLYCEMIA, WITH LONG-TERM CURRENT USE OF INSULIN: Primary | ICD-10-CM

## 2024-12-09 DIAGNOSIS — Z79.4 TYPE 2 DIABETES MELLITUS WITH HYPERGLYCEMIA, WITH LONG-TERM CURRENT USE OF INSULIN: Primary | ICD-10-CM

## 2024-12-09 PROCEDURE — 99204 OFFICE O/P NEW MOD 45 MIN: CPT | Performed by: INTERNAL MEDICINE

## 2024-12-09 PROCEDURE — 3052F HG A1C>EQUAL 8.0%<EQUAL 9.0%: CPT | Performed by: INTERNAL MEDICINE

## 2024-12-09 PROCEDURE — 1159F MED LIST DOCD IN RCRD: CPT | Performed by: INTERNAL MEDICINE

## 2024-12-09 PROCEDURE — 1160F RVW MEDS BY RX/DR IN RCRD: CPT | Performed by: INTERNAL MEDICINE

## 2024-12-09 PROCEDURE — G2211 COMPLEX E/M VISIT ADD ON: HCPCS | Performed by: INTERNAL MEDICINE

## 2024-12-09 RX ORDER — PEN NEEDLE, DIABETIC 32GX 5/32"
1 NEEDLE, DISPOSABLE MISCELLANEOUS 4 TIMES DAILY
Qty: 100 EACH | Refills: 0 | Status: SHIPPED | OUTPATIENT
Start: 2024-12-09

## 2024-12-09 RX ORDER — INSULIN ASPART INJECTION 100 [IU]/ML
2 INJECTION, SOLUTION SUBCUTANEOUS 3 TIMES DAILY
Qty: 15 ML | Refills: 2 | Status: SHIPPED | OUTPATIENT
Start: 2024-12-09

## 2024-12-09 RX ORDER — ACYCLOVIR 800 MG/1
1 TABLET ORAL
Qty: 2 EACH | Refills: 2 | Status: SHIPPED | OUTPATIENT
Start: 2024-12-09

## 2024-12-09 RX ORDER — GLUCAGON 3 MG/1
3 POWDER NASAL DAILY PRN
Qty: 1 EACH | Refills: 0 | Status: SHIPPED | OUTPATIENT
Start: 2024-12-09 | End: 2024-12-10 | Stop reason: SDUPTHER

## 2024-12-09 NOTE — TELEPHONE ENCOUNTER
Caller: Brad Bella    Relationship to patient: Self    Best call back number: [phone]     Patient is needing: pt called in stating that there is an issue with paying for the medication prescribed today. Pt insurance stated that it will be 500 dollars to pay for medication. Pt is wanting to go  medication at 5pm today. Pt stated that he was supposed to call  If there was any issues paying for medication. Please advise and call back.     Glucagon (Baqsimi Two Pack) 3 MG/DOSE powder

## 2024-12-09 NOTE — PROGRESS NOTES
.     REASON FOR FOLLOWUP :   pancreatic adenocarcinoma (localized.  Goal is cure)    HISTORY OF PRESENT ILLNESS:  The patient is a 69 y.o. year old male  who is here for follow-up with the above-mentioned history.    Since last visit with me he had biliary stents placed.  Bilirubin down to 5 today.  Still having trouble improving his weight.  He is leaning towards not taking any therapy even though he understands the goal is cure    Past Medical History:   Diagnosis Date    Diabetes mellitus     Kidney stone 09/05/2020     Past Surgical History:   Procedure Laterality Date    URETEROSCOPY LASER LITHOTRIPSY WITH STENT INSERTION Left 09/18/2020    Procedure: LFT URETEROSCOPY LASER LITHOTRIPSY WITH STENT INSERTION;  Surgeon: James Torres MD;  Location: [facility];  Service: Urology;  Laterality: Left;    VASECTOMY         MEDICATIONS    Current Outpatient Medications:     Insulin Degludec (Tresiba FlexTouch) 200 UNIT/ML solution pen-injector pen injection, Inject 8 Units under the skin into the appropriate area as directed Every Night., Disp: 3.6 mL, Rfl: 3    Blood Glucose Monitoring Suppl (ONE TOUCH ULTRA 2) w/Device kit, 1 time daily before breakfast, Disp: 1 each, Rfl: 0    Continuous Glucose Sensor (FreeStyle Cassie 3 Sensor) misc, Use 1 Units Every 14 (Fourteen) Days. (Patient not taking: Reported on 12/10/2024), Disp: 2 each, Rfl: 2    Glucagon (Gvoke HypoPen 2-Pack) 1 MG/0.2ML solution auto-injector, Inject 1 mg under the skin into the appropriate area as directed Daily As Needed (hypoglycemia). (Patient not taking: Reported on 12/10/2024), Disp: 0.4 mL, Rfl: 1    glucose (B-D GLUCOSE) 5 g chewable tablet, Chew 3 tablets As Needed for Low Blood Sugar. Take 3 tablets for low blood sugar less than 80 (Patient not taking: Reported on 12/10/2024), Disp: 50 tablet, Rfl: 2    glucose blood test strip, 1 times daily before breakfast, Disp: 100 each, Rfl: 3    insulin aspart (NovoLOG FlexPen) 100  UNIT/ML solution pen-injector sc pen, Inject 2 Units under the skin into the appropriate area as directed 3 (Three) Times a Day With Meals., Disp: 15 mL, Rfl: 1    Insulin Aspart, w/Niacinamide, (Fiasp FlexTouch) 100 UNIT/ML solution pen-injector, Inject 2 Units under the skin into the appropriate area as directed 3 (Three) Times a Day., Disp: 15 mL, Rfl: 2    Insulin Pen Needle (BD Pen Needle Norma 2nd Gen) 32G X 4 MM misc, Use 1 Units 4 (Four) Times a Day., Disp: 100 each, Rfl: 0    OneTouch UltraSoft 2 Lancets misc, Use 1 each Every Morning Before Breakfast., Disp: 100 each, Rfl: 2    ALLERGIES:   No Known Allergies    SOCIAL HISTORY:       Social History     Socioeconomic History    Marital status:      Spouse name: Debbie   Tobacco Use    Smoking status: Every Day     Types: Pipe    Smokeless tobacco: Never   Vaping Use    Vaping status: Never Used   Substance and Sexual Activity    Alcohol use: Not Currently    Drug use: Never    Sexual activity: Yes     Partners: Female     Birth control/protection: Vasectomy         FAMILY HISTORY:  Family History   Problem Relation Age of Onset    Diabetes Mother     Heart attack Father     Pancreatic cancer Sister 69       REVIEW OF SYSTEMS:  Review of Systems   Constitutional:  Negative for activity change.   HENT:  Negative for nosebleeds and trouble swallowing.    Respiratory:  Negative for shortness of breath and wheezing.    Cardiovascular:  Negative for chest pain and palpitations.   Gastrointestinal:  Negative for constipation, diarrhea and nausea.   Genitourinary:  Negative for dysuria and hematuria.   Musculoskeletal:  Negative for arthralgias and myalgias.   Neurological:  Negative for seizures and syncope.   Hematological:  Negative for adenopathy. Does not bruise/bleed easily.   Psychiatric/Behavioral:  Negative for confusion.               Vitals:    12/13/24 1441   BP: 113/69   Pulse: 80   Resp: 16   Temp: 98 °F (36.7 °C)   SpO2: 98%   Weight: 67.9 kg  "(149 lb 12.8 oz)   Height: 180.3 cm (70.98\")           12/13/2024     2:41 PM   Current Status   ECOG score 0      PHYSICAL EXAM:        CONSTITUTIONAL:  Vital signs reviewed.  No distress, looks comfortable.  EYES:  Conjunctiva and lids unremarkable.  PERRLA  EARS,NOSE,MOUTH,THROAT:  Ears and nose appear unremarkable.  Lips, teeth, gums appear unremarkable.  RESPIRATORY:  Normal respiratory effort.  Lungs clear to auscultation bilaterally.  CARDIOVASCULAR:  Normal S1, S2.  No murmurs rubs or gallops.  No significant lower extremity edema.  GASTROINTESTINAL: Abdomen appears unremarkable.  Nontender.  No hepatomegaly.  No splenomegaly.  LYMPHATIC:  No cervical, supraclavicular, axillary lymphadenopathy.  SKIN:  Warm.  No rashes.  PSYCHIATRIC:  Normal judgment and insight.  Normal mood and affect.            RECENT LABS:        WBC   Date Value Ref Range Status   12/03/2024 9.67 3.40 - 10.80 10*3/mm3 Final   11/25/2024 7.6 3.4 - 10.8 x10E3/uL Final     Comment:     **Effective December 2, 2024 profile 512128 WBC will be made**    non-orderable as a stand-alone order code.     09/18/2020 17.04 (H) 3.40 - 10.80 10*3/mm3 Final   09/05/2020 13.70 (H) 3.40 - 10.80 10*3/mm3 Final     Hemoglobin   Date Value Ref Range Status   12/03/2024 13.1 13.0 - 17.7 g/dL Final   11/25/2024 14.2 13.0 - 17.7 g/dL Final   09/18/2020 14.1 13.0 - 17.7 g/dL Final   09/05/2020 14.7 13.0 - 17.7 g/dL Final     Platelets   Date Value Ref Range Status   12/03/2024 459 (H) 140 - 450 10*3/mm3 Final   11/25/2024 435 150 - 450 x10E3/uL Final   09/18/2020 517 (H) 140 - 450 10*3/mm3 Final   09/05/2020 448 140 - 450 10*3/mm3 Final       Assessment & Plan   There are no diagnoses linked to this encounter.        Brad Bella   *Pancreatic adenocarcinoma (head of pancreas)   Reviewed PCP note from 11/26/2024: New onset diabetes, elevated LFTs, bilirubin, and alkaline phosphatase.  CT AP with contrast, 11/26/2024, from 9/18/2020: Pancreatic head mass, 2.7 x " 2.2 x 2.1 cm.  Abnormal intrahepatic and extrahepatic biliary ductal dilation compatible with biliary obstruction.  No evidence of liver metastasis.  Scattered small mesenteric and periaortic nodes, not significant by size criteria.  Lung bases: New pulmonary nodule, LLL, 8 mm.  Stable LLL 4 mm nodule and stable RML pleural-based 3 mm nodule  Neoadjuvant FOLFIRINOX versus Gemzar Abraxane (without XRT)  SWOG  compare these 2 regimens, 12 weeks preoperative and 12 weeks postoperative treatment and 102 patients with potentially resectable pancreatic cancer.  Gemzar Abraxane had better results  Up-to-date recommends neoadjuvant modified FOLFIRINOX followed by chemo XRT if patient is felt to be able to tolerate  Up-to-date authors consider upfront pancreatectomy followed by adjuvant chemotherapy to be an equally accepted approach as compared to upfront chemo XRT followed by pancreatectomy  12/3/2024 (initial consult): Bilirubin up to 19.8 from 6.8 on 11/25/2024.  Discussed with Dr. Monse Gutierrez who offered admission to the patient but patient declined.  He is going to do an ERCP for the patient tomorrow as an outpatient to hopefully obtain a tissue diagnosis and perform biliary stenting to relieve the hyperbilirubinemia.  He will also perform a CT of the chest (chest imaging has not yet been done), and repeat abdominal imaging, three-phase, to assess for vessel involvement to assess for resectability.  He request we arrange the PET scan at [facility] which we will do.  Patient denies neuropathy.  I discussed FOLFIRINOX with the patient as I expect that we will be the regimen I recommend for him.  I reviewed side effects and schedule.  I told him I would defer to Dr. Gutierrez sequencing of surgery and chemo and defer to Dr. Gutierrez if radiation will be recommended  I explained to patient if metastatic disease is found, this is not curable.  Fortunately, no clear evidence of metastatic disease on CT of the abdomen  and pelvis.  Patient states if at some point this is determined to not be curable, he will likely want chemo, but wants the main focus to be on quality of life  CT chest and abdomen pelvis three-phase, 12/4/ 24 ([facility]): Multiple bilateral lung nodules..  Radiologist stated differential includes fungal and inflammatory nodules and lung metastasis.  Branching tubular opacity NERY with morphology favoring chronic mucoid impaction.  Posterior pleural plaquing lower lobes, radiologist states differential includes prior asbestos and perhaps neurogenic tumors such as neurofibromas, radiologist states unlikely to be metastasis.  3.4 x 3.2 cm pancreatic head mass with involvement of periampullary region medial wall of second portion of duodenum and obstruction of pancreatic and common bile ducts.  No vascular involvement or evidence of metastatic disease elsewhere in the abdomen or pelvis.  Numerous cystic lesions throughout the pancreas suggesting multiple sidebranch IPMN's measuring up to 2.9 cm.  EUS ERCP with stent placement x 3 and a sphincterotomy, Dr. Gutierrez, 12/4/ 24: Adenocarcinoma.  PET 12/10/2024: Pancreatic head mass 3.4 x 2.4 cm, SUV 10.2.  No hypermetabolic retroperitoneal LAD.  Nodes at reno hepatis of max SUV 2.4 (mediastinal blood pool SUV was 1.9.) multiple bilateral noncalcified pleural plaques with max SUV 1.4.  Photopenic serpiginous curvilinear nodular consolidation NERY which radiologist stated may represent old mucous plugging.  The only hypermetabolic lung opacity is left apex, nonspecific, 1 cm nodular density with some groundglass adjacently, max SUV 5.4.  Radiologist stated opacity may be infectious or inflammatory but could not exclude malignancy especially considering moderate emphysema findings in the lungs.  Radiologist recommended follow-up noncontrast CT chest 6 to 8 weeks.  Therefore, no clear evidence of metastatic disease.  12/13/2024 office visit: Explained the goal is cure.      "Matt recommends neoadjuvant FOLFIRINOX with CT at [facility], [place], operated by [facility] after every 2 months (maximum of 6 months).  Check with him after each CT, with a plan for resection at some point (possibly without any chemoradiation)  Although the goal is cure patient is leaning towards not taking any therapy and instead of enrolling in hospice.  Long discussion with him encouraging him to try therapy knowing he can stop at any time.  He is worried about side effects of chemo and worried about out-of-pocket cost.  I have asked our billing department to discuss with him estimated out-of-pocket cost for upcoming treatments    *Hyperbilirubinemia  11/25/2024: Total bilirubin 6.8.  Direct bilirubin 4.8.  12/3/2024: Bilirubin 19.8.  Urgent ERCP with stent  12/10/2024: Bilirubin 7  12/13/2024: Bilirubin 5.2  Cannot use Irinotecan until bilirubin <2 (no guidelines for reduction from  regarding AST/ALT)  Okay to use oxaliplatin regardless of bilirubin/LFTs  Okay to use 5-FU with \"mild to moderate hepatic impairment without concomitant renal impairment.  Severe impairment: Use is not recommended\" per .  Gemzar: If bilirubin >1.6, use initial dose 800 mg/m2 and may escalate as tolerated.  Abraxane: Cannot use if bilirubin is >5.  Cannot use if any elevation of AST and bilirubin >1.5    *Family history of pancreatic cancer (sister).  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  I do not see any association of this mutation with pancreatic cancer, but patient had a sister with pancreatic cancer  12/13/2024: Referred to [facility], [place], operated by [facility] genetics clinic    *New onset diabetes, likely due to pancreatic cancer  Was due for outpatient endocrinology consult 12/4/ 24, range through PCP.  This will need to be rescheduled due to the urgent need for biliary stenting    *Weight loss  Referred to oncology " dietitian on 12/13/2024    Plan  Although the goal is cure, he is leaning towards no treatment and enrollment in hospice.  First he wants chemo teaching on FOLFIRINOX, hopefully Monday or Tuesday of next week.  Today is Friday  If he decides he wants treatment, he will need   Referral to Dr. Gutierrez for port placement.  If he wants treatment, plan to begin FOLFIRINOX on Tuesday, December 31 at [place]  If he wants treatment, he will need at least weekly CMP until we get started and a few days before the first chemo.  Bilirubin needs to be below 2 for Irinotecan (see orange font above).  If he wants treatment, we will need to tentatively plan all 12 cycles (all 6 months), with CT after every 2 months    Referral to the genetics clinic  Oncology dietitian referral  I asked billing to contact him to try to give an idea of out-of-pocket expense for upcoming treatments    91 minutes.  Total time.  Same day.  I spent a very long time trying to make sure he had all of his questions answered in hopes he would consider at least trying treatment considering this is potentially curable.    I am following him longitudinally

## 2024-12-09 NOTE — PROGRESS NOTES
Chief complaint   Chief Complaint   Patient presents with    Diabetes          Subjective     History of Present Illness:          This is a 69 year old male I am seeing for type 2 DM   referred by PCP     other medical history is   1- H/o kidney stones   2- Smoker   3- Other   Pt had T2DM in    Current home medication for diabetes     Tresiba 10 units once a day     the A1c was 8.1 in   Checks blood sugar at home using finger sick   Checks blood sugar 1-4 times per day   [id number]-[id number]   pt states that he is having a new Dx of pancreatic disease , trying his best with dietary Compliance, in regards to Exercise, not on a daily basis and his Weight has been the same and there is No Hypoglycemic episodes, there is no AM Headaches /Nightmares, no Polyuria / Polydipsia, and there os no Blurring of Vision  Last Dilated Pupil Exam, in  , no DM in the eye   NoTingling / numbness in feet, No Dizziness / lightheadedness, No Falls  No Nausea, vomiting / Diarrhea  retired.   Latest Reference Range & Units 24 11:42   Hemoglobin A1C 4.8 - 5.6 % 8.1 (H)   Lipase 13 - 78 U/L 93 (H)   (H): Data is abnormally high  Family History   Problem Relation Age of Onset    Diabetes Mother     Heart attack Father     Pancreatic cancer Sister 69     Social History     Socioeconomic History    Marital status:      Spouse name: Debbie   Tobacco Use    Smoking status: Every Day     Current packs/day: 0.25     Average packs/day: 0.3 packs/day for 55.0 years (13.8 ttl pk-yrs)     Types: Pipe, Cigarettes    Smokeless tobacco: Never   Vaping Use    Vaping status: Never Used   Substance and Sexual Activity    Alcohol use: Not Currently    Drug use: Never    Sexual activity: Yes     Partners: Female     Birth control/protection: Vasectomy     Past Medical History:   Diagnosis Date    Diabetes mellitus     Kidney stone 2020     Past Surgical History:   Procedure Laterality Date    URETEROSCOPY LASER LITHOTRIPSY WITH STENT  INSERTION Left 9/18/2020    Procedure: LFT URETEROSCOPY LASER LITHOTRIPSY WITH STENT INSERTION;  Surgeon: James Torres MD;  Location: [facility];  Service: Urology;  Laterality: Left;       Current Outpatient Medications:     Blood Glucose Monitoring Suppl (ONE TOUCH ULTRA 2) w/Device kit, 1 time daily before breakfast, Disp: 1 each, Rfl: 0    glucose (B-D GLUCOSE) 5 g chewable tablet, Chew 3 tablets As Needed for Low Blood Sugar. Take 3 tablets for low blood sugar less than 80, Disp: 50 tablet, Rfl: 2    glucose blood test strip, 1 times daily before breakfast, Disp: 100 each, Rfl: 3    Insulin Degludec (Tresiba FlexTouch) 200 UNIT/ML solution pen-injector pen injection, Inject 10 Units under the skin into the appropriate area as directed Every Night., Disp: 3 mL, Rfl: 2    OneTouch UltraSoft 2 Lancets misc, Use 1 each Every Morning Before Breakfast., Disp: 100 each, Rfl: 2    Continuous Glucose Sensor (FreeStyle Cassie 3 Sensor) misc, Use 1 Units Every 14 (Fourteen) Days., Disp: 2 each, Rfl: 2    Glucagon (Baqsimi Two Pack) 3 MG/DOSE powder, Administer 3 mg into the nostril(s) as directed by provider Daily As Needed (hypoglycemia)., Disp: 1 each, Rfl: 0    Insulin Aspart, w/Niacinamide, (Fiasp FlexTouch) 100 UNIT/ML solution pen-injector, Inject 2 Units under the skin into the appropriate area as directed 3 (Three) Times a Day., Disp: 15 mL, Rfl: 2    Insulin Pen Needle (BD Pen Needle Norma 2nd Gen) 32G X 4 MM misc, Use 1 Units 4 (Four) Times a Day., Disp: 100 each, Rfl: 0  Patient has no known allergies.    Objective   Vitals:    12/09/24 1345   BP: 124/66   Pulse: 89   Temp: 98.1 °F (36.7 °C)   SpO2: 100%          Physical Exam  Neurological:      General: No focal deficit present.      Mental Status: He is alert and oriented to person, place, and time.               Diagnoses and all orders for this visit:    1. Type 2 diabetes mellitus with hyperglycemia, with long-term current use of insulin  (Primary)  -     Ambulatory Referral to Diabetic Education  -     Ambulatory Referral to Nutrition Services  -     Glucagon (Baqsimi Two Pack) 3 MG/DOSE powder; Administer 3 mg into the nostril(s) as directed by provider Daily As Needed (hypoglycemia).  Dispense: 1 each; Refill: 0  -     Insulin Pen Needle (BD Pen Needle Norma 2nd Gen) 32G X 4 MM misc; Use 1 Units 4 (Four) Times a Day.  Dispense: 100 each; Refill: 0  -     Continuous Glucose Sensor (FreeStyle Cassie 3 Sensor) misc; Use 1 Units Every 14 (Fourteen) Days.  Dispense: 2 each; Refill: 2  -     Insulin Aspart, w/Niacinamide, (Fiasp FlexTouch) 100 UNIT/ML solution pen-injector; Inject 2 Units under the skin into the appropriate area as directed 3 (Three) Times a Day.  Dispense: 15 mL; Refill: 2         Assessment:     1- DM, Type 2  New Dx , with High risk secondary to pancreatic disease   labs on file   We discussed the medications  Hypoglycemia and its importance was reviewed   Labs where shown to the patient   2. BP is in good range   3. No lipid panel on file   4. Long term insulin use         Plan:    1. Diet, exercise and weight management  2. Consistent food intake to avoid low blood sugars  3. Home medication includes for diabetes     Change to Tresiba 8 units once a day   Start Fiasp 2 units with meals TID   Will send a rx for Glucagon PRN for low Bg     4. Consistent checking of blood sugars using finger stick   Will try to get him a sensor   5. I reviewed the last progress note  6. Annual eye exam  7. Return in 1 months   8. Ref to Dm ed and dietician as he has a lot of diet questions   9. Rx sent to the pharmacy  10. Labs : A1c today       I discussed with the patient/legal representative the risks and the benefits associated with the medications.  The patient/legal representative has been given the opportunity to ask questions.  Alternatives to the proposed treatment (s) were discussed, including the likely results of no treatment.  The  patient/legal representative wishes to proceed.       Matt Gatica MD   12/09/24  14:12 EST

## 2024-12-10 ENCOUNTER — HOSPITAL ENCOUNTER (OUTPATIENT)
Dept: PET IMAGING | Facility: HOSPITAL | Age: 69
Discharge: HOME | End: 2024-12-10
Payer: MEDICARE

## 2024-12-10 ENCOUNTER — OFFICE VISIT (OUTPATIENT)
Dept: FAMILY MEDICINE CLINIC | Facility: CLINIC | Age: 69
End: 2024-12-10
Payer: MEDICARE

## 2024-12-10 ENCOUNTER — LAB (OUTPATIENT)
Dept: LAB | Facility: HOSPITAL | Age: 69
End: 2024-12-10
Payer: MEDICARE

## 2024-12-10 VITALS
BODY MASS INDEX: 21.31 KG/M2 | DIASTOLIC BLOOD PRESSURE: 58 MMHG | SYSTOLIC BLOOD PRESSURE: 110 MMHG | HEIGHT: 71 IN | TEMPERATURE: 98 F | HEART RATE: 86 BPM | WEIGHT: 152.2 LBS | OXYGEN SATURATION: 98 %

## 2024-12-10 DIAGNOSIS — R93.5 ABNORMAL FINDINGS ON DIAGNOSTIC IMAGING OF OTHER ABDOMINAL REGIONS, INCLUDING RETROPERITONEUM: ICD-10-CM

## 2024-12-10 DIAGNOSIS — Z79.4 TYPE 2 DIABETES MELLITUS WITH HYPERGLYCEMIA, WITH LONG-TERM CURRENT USE OF INSULIN: Primary | ICD-10-CM

## 2024-12-10 DIAGNOSIS — E11.65 TYPE 2 DIABETES MELLITUS WITH HYPERGLYCEMIA, WITH LONG-TERM CURRENT USE OF INSULIN: Primary | ICD-10-CM

## 2024-12-10 DIAGNOSIS — C25.9 PANCREATIC ADENOCARCINOMA: ICD-10-CM

## 2024-12-10 DIAGNOSIS — E11.9 DIABETES MELLITUS, NEW ONSET: ICD-10-CM

## 2024-12-10 LAB
ALBUMIN SERPL-MCNC: 3.6 G/DL (ref 3.5–5.2)
ALBUMIN/GLOB SERPL: 1.2 G/DL
ALP SERPL-CCNC: 275 U/L (ref 39–117)
ALT SERPL W P-5'-P-CCNC: 110 U/L (ref 1–41)
ANION GAP SERPL CALCULATED.3IONS-SCNC: 12.8 MMOL/L (ref 5–15)
AST SERPL-CCNC: 84 U/L (ref 1–40)
BILIRUB CONJ SERPL-MCNC: 5.2 MG/DL (ref 0–0.3)
BILIRUB SERPL-MCNC: 7 MG/DL (ref 0–1.2)
BUN SERPL-MCNC: 10 MG/DL (ref 8–23)
BUN/CREAT SERPL: 16.1 (ref 7–25)
CALCIUM SPEC-SCNC: 9.1 MG/DL (ref 8.6–10.5)
CHLORIDE SERPL-SCNC: 100 MMOL/L (ref 98–107)
CO2 SERPL-SCNC: 24.2 MMOL/L (ref 22–29)
CREAT SERPL-MCNC: 0.62 MG/DL (ref 0.76–1.27)
EGFRCR SERPLBLD CKD-EPI 2021: 103.5 ML/MIN/1.73
GLOBULIN UR ELPH-MCNC: 2.9 GM/DL
GLUCOSE BLDC GLUCOMTR-MCNC: 121 MG/DL (ref 70–130)
GLUCOSE SERPL-MCNC: 113 MG/DL (ref 65–99)
POTASSIUM SERPL-SCNC: 3.7 MMOL/L (ref 3.5–5.2)
PROT SERPL-MCNC: 6.5 G/DL (ref 6–8.5)
SODIUM SERPL-SCNC: 137 MMOL/L (ref 136–145)

## 2024-12-10 PROCEDURE — G2211 COMPLEX E/M VISIT ADD ON: HCPCS | Performed by: FAMILY MEDICINE

## 2024-12-10 PROCEDURE — 34310000005 FLUDEOXYGLUCOSE F18 SOLUTION: Performed by: INTERNAL MEDICINE

## 2024-12-10 PROCEDURE — 1126F AMNT PAIN NOTED NONE PRSNT: CPT | Performed by: FAMILY MEDICINE

## 2024-12-10 PROCEDURE — 78815 PET IMAGE W/CT SKULL-THIGH: CPT

## 2024-12-10 PROCEDURE — 82948 REAGENT STRIP/BLOOD GLUCOSE: CPT

## 2024-12-10 PROCEDURE — 36415 COLL VENOUS BLD VENIPUNCTURE: CPT

## 2024-12-10 PROCEDURE — 3052F HG A1C>EQUAL 8.0%<EQUAL 9.0%: CPT | Performed by: FAMILY MEDICINE

## 2024-12-10 PROCEDURE — A9552 F18 FDG: HCPCS | Performed by: INTERNAL MEDICINE

## 2024-12-10 PROCEDURE — 80053 COMPREHEN METABOLIC PANEL: CPT

## 2024-12-10 PROCEDURE — 99214 OFFICE O/P EST MOD 30 MIN: CPT | Performed by: FAMILY MEDICINE

## 2024-12-10 PROCEDURE — 82248 BILIRUBIN DIRECT: CPT

## 2024-12-10 RX ORDER — GLUCAGON INJECTION, SOLUTION 1 MG/.2ML
1 INJECTION, SOLUTION SUBCUTANEOUS DAILY PRN
Qty: 0.4 ML | Refills: 1 | Status: SHIPPED | OUTPATIENT
Start: 2024-12-10

## 2024-12-10 RX ORDER — INSULIN DEGLUDEC 200 U/ML
8 INJECTION, SOLUTION SUBCUTANEOUS NIGHTLY
Qty: 3.6 ML | Refills: 3 | COMMUNITY
Start: 2024-12-10 | End: 2024-12-12 | Stop reason: SDUPTHER

## 2024-12-10 RX ADMIN — FLUDEOXYGLUCOSE F 18 1 DOSE: 200 INJECTION, SOLUTION INTRAVENOUS at 13:00

## 2024-12-10 NOTE — PROGRESS NOTES
Chief Complaint  Follow-up    Subjective         Brad Bella presents to [facility]  History of Present Illness    69-year-old male with newly diagnosed diabetes with neuroendocrine pancreatic cancer with cholestasis s/p sphincterotomy and biliary stent placement x 2 on 12/4/2024   Patient is currently following with oncology, GI and endocrinology.    History Tresiba was lowered to 8 units and patient was started on 2 units aspart by endocrinology.  Patient denies hypoglycemia symptoms  Patient needs Tresiba refilled today    Patient has had worsening jaundice before his sphincterotomy and biliary stents placement, he states this has improved significantly since.    Patient is going for PET scan today and repeat lab work.    Objective     Review of Systems     Past Medical History:   Diagnosis Date    Diabetes mellitus     Kidney stone 09/05/2020        Current Outpatient Medications:     Blood Glucose Monitoring Suppl (ONE TOUCH ULTRA 2) w/Device kit, 1 time daily before breakfast, Disp: 1 each, Rfl: 0    glucose blood test strip, 1 times daily before breakfast, Disp: 100 each, Rfl: 3    Insulin Aspart, w/Niacinamide, (Fiasp FlexTouch) 100 UNIT/ML solution pen-injector, Inject 2 Units under the skin into the appropriate area as directed 3 (Three) Times a Day., Disp: 15 mL, Rfl: 2    Insulin Degludec (Tresiba FlexTouch) 200 UNIT/ML solution pen-injector pen injection, Inject 8 Units under the skin into the appropriate area as directed Every Night., Disp: 3.6 mL, Rfl: 3    Insulin Pen Needle (BD Pen Needle Norma 2nd Gen) 32G X 4 MM misc, Use 1 Units 4 (Four) Times a Day., Disp: 100 each, Rfl: 0    OneTouch UltraSoft 2 Lancets misc, Use 1 each Every Morning Before Breakfast., Disp: 100 each, Rfl: 2    Continuous Glucose Sensor (FreeStyle Cassie 3 Sensor) misc, Use 1 Units Every 14 (Fourteen) Days. (Patient not taking: Reported on 12/10/2024), Disp: 2 each, Rfl: 2    Glucagon (Gvoke HypoPen  "2-Pack) 1 MG/0.2ML solution auto-injector, Inject 1 mg under the skin into the appropriate area as directed Daily As Needed (hypoglycemia). (Patient not taking: Reported on 12/10/2024), Disp: 0.4 mL, Rfl: 1    glucose (B-D GLUCOSE) 5 g chewable tablet, Chew 3 tablets As Needed for Low Blood Sugar. Take 3 tablets for low blood sugar less than 80 (Patient not taking: Reported on 12/10/2024), Disp: 50 tablet, Rfl: 2  No current facility-administered medications for this visit.   Social History     Socioeconomic History    Marital status:      Spouse name: Debbie   Tobacco Use    Smoking status: Every Day     Types: Pipe    Smokeless tobacco: Never   Vaping Use    Vaping status: Never Used   Substance and Sexual Activity    Alcohol use: Not Currently    Drug use: Never    Sexual activity: Yes     Partners: Female     Birth control/protection: Vasectomy      Vital Signs:   /58 (BP Location: Left arm, Patient Position: Sitting, Cuff Size: Adult)   Pulse 86   Temp 98 °F (36.7 °C)   Ht 180.3 cm (70.98\")   Wt 69 kg (152 lb 3.2 oz)   SpO2 98%   BMI 21.24 kg/m²       Physical Exam  Constitutional:       Appearance: Normal appearance.   Eyes:      General: Scleral icterus present.   Cardiovascular:      Rate and Rhythm: Normal rate and regular rhythm.   Pulmonary:      Effort: Pulmonary effort is normal.      Breath sounds: Normal breath sounds.   Skin:     Coloration: Skin is jaundiced.   Neurological:      Mental Status: He is alert.          Result Review :                    Latest Reference Range & Units 11/25/24 11:42 12/03/24 15:22 12/10/24 12:50   Sodium 136 - 145 mmol/L 138 132 (L) 137   Potassium 3.5 - 5.2 mmol/L 4.6 3.6 3.7   Chloride 98 - 107 mmol/L 99 94 (L) 100   CO2 22.0 - 29.0 mmol/L 24 27.4 24.2   Anion Gap 5.0 - 15.0 mmol/L  10.6 12.8   BUN 8 - 23 mg/dL 6 (L) 13 10   Creatinine 0.76 - 1.27 mg/dL 0.76 0.58 (L) 0.62 (L)   BUN/Creatinine Ratio 7.0 - 25.0  8 (L) 22.4 16.1   eGFR >60.0 mL/min/1.73 "  105.6 103.5   EGFR Result >59 mL/min/1.73 97     Glucose 65 - 99 mg/dL  70 - 130 mg/dL 207 (H) 142 (H) 113 (H)  121   Calcium 8.6 - 10.5 mg/dL 10.0 9.7 9.1   Alkaline Phosphatase 39 - 117 U/L 364 (H) 388 (H) 275 (H)   Total Protein 6.0 - 8.5 g/dL 7.1 6.9 6.5   Albumin 3.5 - 5.2 g/dL 4.6 4.0 3.6   Globulin gm/dL  2.9 2.9   A/G Ratio g/dL  1.4 1.2   AST (SGOT) 1 - 40 U/L 176 (H) 64 (H) 84 (C)   ALT (SGPT) 1 - 41 U/L 374 (H) 125 (H) 110 (C)   Total Bilirubin 0.0 - 1.2 mg/dL 6.8 (H) 19.8 (H) 7.0 (C)   Bilirubin, Direct 0.0 - 0.3 mg/dL 4.82 (H) >10.0 (H) 5.2 (H)   Hemoglobin A1C 4.8 - 5.6 % 8.1 (H)     Lipase 13 - 78 U/L 93 (H)     WBC 3.40 - 10.80 10*3/mm3 7.6 9.67    RBC 4.14 - 5.80 10*6/mm3 4.77 4.38    Hemoglobin 13.0 - 17.7 g/dL 14.2 13.1    Hematocrit 37.5 - 51.0 % 44.1 37.5    Platelets 140 - 450 10*3/mm3 435 459 (H)    RDW 12.3 - 15.4 % 13.4 15.8 (H)    MCV 79.0 - 97.0 fL 93 85.6    MCH 26.6 - 33.0 pg 29.8 29.9    MCHC 31.5 - 35.7 g/dL 32.2 34.9    MPV 6.0 - 12.0 fL  11.5    RDW-SD 37.0 - 54.0 fl  49.4    Neutrophil Rel % 42.7 - 76.0 % 68 69.8    Lymphocyte Rel % 19.6 - 45.3 % 20 15.7 (L)    Monocyte Rel % 5.0 - 12.0 % 10 10.8    Eosinophil Rel % 0.3 - 6.2 % 2 1.4    Basophil Rel % 0.0 - 1.5 % 0 0.3    Immature Granulocyte Rel % 0.0 - 0.5 % 0 2.0 (H)    Neutrophils Absolute 1.70 - 7.00 10*3/mm3 5.2 6.75    Lymphocytes Absolute 0.70 - 3.10 10*3/mm3 1.5 1.52    Monocytes Absolute 0.10 - 0.90 10*3/mm3 0.7 1.04 (H)    Eosinophils Absolute 0.00 - 0.40 10*3/mm3 0.1 0.14    Basophils Absolute 0.00 - 0.20 10*3/mm3 0.0 0.03    Immature Grans, Absolute 0.00 - 0.05 10*3/mm3 0.0 0.19 (H)    nRBC 0.0 - 0.2 /100 WBC  0.0    CA 19-9 <=35.0 U/mL  1,355.0 (H)    PSA 0.0 - 4.0 ng/mL 0.6     (C): Data is critically high  (L): Data is abnormally low  (H): Data is abnormally high      CT 3 Phase Pancreas, Abd & Pel Without & With IV Without Oral Contrast (12/04/2024 14:05)  CT Chest W Contrast (12/04/2024 14:06)      Assessment and  Plan    Diagnoses and all orders for this visit:    1. Diabetes mellitus, new onset  -     Insulin Degludec (Tresiba FlexTouch) 200 UNIT/ML solution pen-injector pen injection; Inject 8 Units under the skin into the appropriate area as directed Every Night.  Dispense: 3.6 mL; Refill: 3    Continue insulin dosing per endocrinology  Tresiba 8 units at bedtime  Aspart units Premeal  Recent labs and imaging studies report reviewed      Follow Up   No follow-ups on file.  Patient was given instructions and counseling regarding his condition or for health maintenance advice. Please see specific information pulled into the AVS if appropriate.

## 2024-12-11 ENCOUNTER — TELEPHONE (OUTPATIENT)
Dept: ENDOCRINOLOGY | Age: 69
End: 2024-12-11
Payer: MEDICARE

## 2024-12-11 DIAGNOSIS — E11.65 TYPE 2 DIABETES MELLITUS WITH HYPERGLYCEMIA, WITH LONG-TERM CURRENT USE OF INSULIN: Primary | ICD-10-CM

## 2024-12-11 DIAGNOSIS — Z79.4 TYPE 2 DIABETES MELLITUS WITH HYPERGLYCEMIA, WITH LONG-TERM CURRENT USE OF INSULIN: Primary | ICD-10-CM

## 2024-12-11 LAB — REF LAB TEST RESULTS: NORMAL

## 2024-12-11 RX ORDER — INSULIN ASPART 100 [IU]/ML
2 INJECTION, SOLUTION INTRAVENOUS; SUBCUTANEOUS
Qty: 15 ML | Refills: 1 | Status: SHIPPED | OUTPATIENT
Start: 2024-12-11

## 2024-12-11 NOTE — TELEPHONE ENCOUNTER
Hub staff attempted to follow warm transfer process and was unsuccessful     Caller: [facility] [address] [place] [address] - [address] [facility] [phone] [facility] [phone] FX    Relationship to patient: Pharmacy    Best call back number: [phone]    Patient is needing: NEEDING INSTRUCTIONS ON THE MEDICATION   Insulin Aspart, w/Niacinamide, (Fiasp FlexTouch) 100 UNIT/ML solution pen-injector    MEDICATION IS ON BACK ORDER. IF MEDICATION CAN BE CHANGED PLEASE ADD SPECIFIC INSTRUCTIONS . ASKED FOR A CALL BACK.

## 2024-12-12 DIAGNOSIS — E11.9 DIABETES MELLITUS, NEW ONSET: ICD-10-CM

## 2024-12-12 RX ORDER — INSULIN DEGLUDEC 200 U/ML
8 INJECTION, SOLUTION SUBCUTANEOUS NIGHTLY
Qty: 3.6 ML | Refills: 3 | Status: SHIPPED | OUTPATIENT
Start: 2024-12-12

## 2024-12-12 NOTE — TELEPHONE ENCOUNTER
Caller: LISA BALLARD    Relationship: Emergency Contact    Best call back number: [phone]     Requested Prescriptions:   Requested Prescriptions     Pending Prescriptions Disp Refills    Insulin Degludec (Tresiba FlexTouch) 200 UNIT/ML solution pen-injector pen injection 3.6 mL 3     Sig: Inject 8 Units under the skin into the appropriate area as directed Every Night.        Pharmacy where request should be sent: [facility] [address] [place] [address] [phone] [facility] [phone]      Last office visit with prescribing clinician: 12/10/2024   Last telemedicine visit with prescribing clinician: Visit date not found   Next office visit with prescribing clinician: Visit date not found     Additional details provided by patient: PATIENTS WIFE STATED THE PATIENT WAS SEEN ON 12-, AND WAS TO HAVE HIS TRESIBA INSULIN SENT TO HIS [facility].    PATIENTS WIFE STATED THE PHARMACY HAS NOT RECEIVED THIS ORDER.    PATIENTS WIFE IS REQUESTING THIS BE RESENT TO THE PHARMACY SO THE PATIENT DOES NOT RUN OUT OF THIS MEDICATION.    Does the patient have less than a 3 day supply:  [] Yes  [x] No    Would you like a call back once the refill request has been completed: [] Yes [x] No    If the office needs to give you a call back, can they leave a voicemail: [] Yes [x] No    Hernan Parsons Rep   12/12/24 15:29 EST

## 2024-12-13 ENCOUNTER — OFFICE VISIT (OUTPATIENT)
Dept: ONCOLOGY | Facility: CLINIC | Age: 69
End: 2024-12-13
Payer: MEDICARE

## 2024-12-13 ENCOUNTER — LAB (OUTPATIENT)
Dept: OTHER | Facility: HOSPITAL | Age: 69
End: 2024-12-13
Payer: MEDICARE

## 2024-12-13 VITALS
RESPIRATION RATE: 16 BRPM | WEIGHT: 149.8 LBS | DIASTOLIC BLOOD PRESSURE: 69 MMHG | BODY MASS INDEX: 20.97 KG/M2 | OXYGEN SATURATION: 98 % | HEIGHT: 71 IN | TEMPERATURE: 98 F | SYSTOLIC BLOOD PRESSURE: 113 MMHG | HEART RATE: 80 BPM

## 2024-12-13 DIAGNOSIS — C25.9 PANCREATIC ADENOCARCINOMA: Primary | ICD-10-CM

## 2024-12-13 DIAGNOSIS — C25.9 PANCREATIC ADENOCARCINOMA: ICD-10-CM

## 2024-12-13 LAB
ALBUMIN SERPL-MCNC: 3.8 G/DL (ref 3.5–5.2)
ALBUMIN/GLOB SERPL: 1.3 G/DL
ALP SERPL-CCNC: 258 U/L (ref 39–117)
ALT SERPL W P-5'-P-CCNC: 118 U/L (ref 1–41)
ANION GAP SERPL CALCULATED.3IONS-SCNC: 7.1 MMOL/L (ref 5–15)
AST SERPL-CCNC: 76 U/L (ref 1–40)
BILIRUB CONJ SERPL-MCNC: 3.5 MG/DL (ref 0–0.3)
BILIRUB SERPL-MCNC: 5.2 MG/DL (ref 0–1.2)
BUN SERPL-MCNC: 14 MG/DL (ref 8–23)
BUN/CREAT SERPL: 20.9 (ref 7–25)
CALCIUM SPEC-SCNC: 9.3 MG/DL (ref 8.6–10.5)
CHLORIDE SERPL-SCNC: 104 MMOL/L (ref 98–107)
CO2 SERPL-SCNC: 26.9 MMOL/L (ref 22–29)
CREAT SERPL-MCNC: 0.67 MG/DL (ref 0.76–1.27)
EGFRCR SERPLBLD CKD-EPI 2021: 101.1 ML/MIN/1.73
GLOBULIN UR ELPH-MCNC: 3 GM/DL
GLUCOSE SERPL-MCNC: 156 MG/DL (ref 65–99)
POTASSIUM SERPL-SCNC: 4.8 MMOL/L (ref 3.5–5.2)
PROT SERPL-MCNC: 6.8 G/DL (ref 6–8.5)
SODIUM SERPL-SCNC: 138 MMOL/L (ref 136–145)

## 2024-12-13 PROCEDURE — 36415 COLL VENOUS BLD VENIPUNCTURE: CPT

## 2024-12-13 PROCEDURE — 82248 BILIRUBIN DIRECT: CPT | Performed by: INTERNAL MEDICINE

## 2024-12-13 PROCEDURE — 80053 COMPREHEN METABOLIC PANEL: CPT | Performed by: INTERNAL MEDICINE

## 2024-12-16 ENCOUNTER — TELEPHONE (OUTPATIENT)
Dept: ONCOLOGY | Facility: CLINIC | Age: 69
End: 2024-12-16
Payer: MEDICARE

## 2024-12-16 DIAGNOSIS — C25.9 PANCREATIC ADENOCARCINOMA: Primary | ICD-10-CM

## 2024-12-16 PROBLEM — Z79.899 HIGH RISK MEDICATION USE: Status: ACTIVE | Noted: 2024-12-16

## 2024-12-16 NOTE — TELEPHONE ENCOUNTER
----- Message from Lawanda BETHEA sent at 12/16/2024 10:50 AM EST -----  There is no [place] [facility]    Lawanda  ----- Message -----  From: Velia Trejo RN  Sent: 12/16/2024  10:39 AM EST  To: Lawanda Webber,     We are actually looking for the relative program. Do you have any information on this for me to be able to pass along for the patient?    Thank you  ----- Message -----  From: Lawanda Bills  Sent: 12/16/2024  10:31 AM EST  To: Velia Trejo RN;  [facility] [place]      Velia,    I only found genetic counseling and testing for relatives.      But, below link is the vaccine study that [facility] has for pancreatic cancer.  It's a personalized vaccine for pancreatic cancer but patient must first be diagnosed with pancreatic cancer in the early stages.    https://[email or web address]/news/[facility]/a-potential-breakthrough-dj-khlduyziol-gv-pancreatic-cancer-vaccine      Hope this helps,    Lawanda  Research    ----- Message -----  From: Velia Trejo RN  Sent: 12/16/2024   8:19 AM EST  To: [facility] [place]    Hello,     Does anyone know if this is a resource available for patients families?    Thank you  ----- Message -----  From: Reed Castillo II, MD  Sent: 12/13/2024   4:51 PM EST  To: LAUREN Lunsford,      Please find out if the [facility] has a program to screen for pancreatic cancer for people with first-degree relatives with pancreatic cancer.  I think they do but I am not sure of this.  If they do, please let this patient know about this so he can share the information with his family and let me know as well  Thanks!

## 2024-12-16 NOTE — TELEPHONE ENCOUNTER
----- Message from Melissa SERVIN sent at 12/16/2024  9:58 AM EST -----  Precert would not be able to tell him what he will have to pay out of pocket that would be someone in billing that would have to help with that. He is Medicare A&B and from a precert standpoint we would just compliance his treatment.    Thank you  ----- Message -----  From: Marline Arnold RegSched Rep  Sent: 12/16/2024   9:22 AM EST  To: Latasha Eden; #    Brad Bella   Symone READ; 69 yrs; 1955  MRN: [id number]  No Shows: 0% (0/14)   Oncology Scheduling              If possible, someone from billing should call him to answer his questions about expected out-of-pocket costs for neoadjuvant chemotherapy followed by surgery for pancreatic cancer.  He is considering not taking any treatment because he is worried about leaving behind a lot of bills for his family.      I would assume you all would check for approval from his insurance and be able to tell him about out of pocket costs, please and thank you, Carla

## 2024-12-16 NOTE — TELEPHONE ENCOUNTER
----- Message from Reed Castillo sent at 12/13/2024  4:27 PM EST -----  Renato Gunn,    He needs to speak to the oncology dietitian to try to improve caloric intake in the setting of diabetes sometime soon. Needs chemo education for FOLFIRINOX. The education needs to ideally be on Monday, or at least by Tuesday. If possible, someone from billing should call him to answer his questions about expected out-of-pocket costs for neoadjuvant chemotherapy followed by surgery for pancreatic cancer. He is considering not taking any treatment because he is worried about leaving behind a lot of bills for his family. He also needs to see the genetics counselor due to an abnormal genetic mutation and pancreatic cancer in both himself and his sister    Thanks!

## 2024-12-16 NOTE — TELEPHONE ENCOUNTER
----- Message from Marline ROUSE sent at 12/13/2024  4:56 PM EST -----  Needs chemo educ - asap.  Billing needs to call him -   Genetics counselor    Alda goodman on Monday  ----- Message -----  From: Reed Castillo II, MD  Sent: 12/13/2024   4:28 PM EST  To: Marline Arnold [facility] Rep; #    Hi Velia,    He needs to speak to the oncology dietitian to try to improve caloric intake in the setting of diabetes sometime soon. Needs chemo education for FOLFIRINOX. The education needs to ideally be on Monday, or at least by Tuesday. If possible, someone from billing should call him to answer his questions about expected out-of-pocket costs for neoadjuvant chemotherapy followed by surgery for pancreatic cancer. He is considering not taking any treatment because he is worried about leaving behind a lot of bills for his family. He also needs to see the genetics counselor due to an abnormal genetic mutation and pancreatic cancer in both himself and his sister    Thanks!

## 2024-12-18 ENCOUNTER — TELEPHONE (OUTPATIENT)
Dept: ENDOCRINOLOGY | Age: 69
End: 2024-12-18

## 2024-12-18 ENCOUNTER — DOCUMENTATION (OUTPATIENT)
Dept: ONCOLOGY | Facility: CLINIC | Age: 69
End: 2024-12-18
Payer: MEDICARE

## 2024-12-18 ENCOUNTER — CLINICAL SUPPORT (OUTPATIENT)
Dept: ENDOCRINOLOGY | Age: 69
End: 2024-12-18
Payer: MEDICARE

## 2024-12-18 ENCOUNTER — OFFICE VISIT (OUTPATIENT)
Dept: ONCOLOGY | Facility: CLINIC | Age: 69
End: 2024-12-18
Payer: MEDICARE

## 2024-12-18 VITALS
RESPIRATION RATE: 16 BRPM | DIASTOLIC BLOOD PRESSURE: 70 MMHG | HEART RATE: 75 BPM | OXYGEN SATURATION: 97 % | WEIGHT: 146.3 LBS | HEIGHT: 71 IN | BODY MASS INDEX: 20.48 KG/M2 | TEMPERATURE: 97.9 F | SYSTOLIC BLOOD PRESSURE: 124 MMHG

## 2024-12-18 DIAGNOSIS — C25.9 PANCREATIC ADENOCARCINOMA: Primary | ICD-10-CM

## 2024-12-18 DIAGNOSIS — E11.65 TYPE 2 DIABETES MELLITUS WITH HYPERGLYCEMIA, WITH LONG-TERM CURRENT USE OF INSULIN: Primary | ICD-10-CM

## 2024-12-18 DIAGNOSIS — Z79.899 HIGH RISK MEDICATION USE: ICD-10-CM

## 2024-12-18 DIAGNOSIS — Z79.4 TYPE 2 DIABETES MELLITUS WITH HYPERGLYCEMIA, WITH LONG-TERM CURRENT USE OF INSULIN: Primary | ICD-10-CM

## 2024-12-18 RX ORDER — ONDANSETRON 8 MG/1
8 TABLET, FILM COATED ORAL 3 TIMES DAILY PRN
Qty: 30 TABLET | Refills: 5 | Status: SHIPPED | OUTPATIENT
Start: 2024-12-22

## 2024-12-18 RX ORDER — LIDOCAINE/PRILOCAINE 2.5 %-2.5%
1 CREAM (GRAM) TOPICAL AS NEEDED
Qty: 30 G | Refills: 3 | Status: SHIPPED | OUTPATIENT
Start: 2024-12-18

## 2024-12-18 NOTE — TELEPHONE ENCOUNTER
Pt came in for an MA Training today and mentioned that he still hasn't heard anything regarding the referral  put in for a Dietitian. Can you look into this for me. Thanks.

## 2024-12-18 NOTE — PROGRESS NOTES
Renato Gunn,    I see that he had chemo education today but I do not see any further follow-up planned.      This is an excerpt from my last note  · If he decides he wants treatment, he will need   º Referral to Dr. Gutierrez for port placement.  · If he wants treatment, plan to begin FOLFIRINOX on Tuesday, December 31 at [place]  · If he wants treatment, he will need at least weekly CMP until we get started and a few days before the first chemo.  Bilirubin needs to be below 2 for Irinotecan (see orange font above).  · If he wants treatment, we will need to tentatively plan all 12 cycles (all 6 months), with CT after every 2 months      If he wants therapy, please make sure he has appointments for all 12 cycles (all 6 months), with CT after every 2 months.  I suspect he is still deciding.  If so, Kelsy, we will check in with him periodically?    Also, Kelsy, please make sure he knows that Chris checked with our billing department and his insurance is expected to cover his medical expenses for treatment    Thanks!

## 2024-12-18 NOTE — PATIENT INSTRUCTIONS
"FreeStyle Cassie 3 Patient Information:    Freestyle Cassie 3 has two parts: a sensor and a reader, which is an rené on a smart phone.   The smart phone can allow readings to be automatically sent to the clinic using our invitation code allowing us to review for your appointment.    Sensors:  Sensors come pre-assembled.  Sensors are placed on back of the upper arm. Do not apply to the stomach.  Application process:   Clean area with alcohol beforehand.   Sensor placement is important and you will want to change your insertion site with each sensor. Using the same site too often might not allow the skin to heal, causing scarring or skin irritation.  Must be changed every 14 days. The rené will notify you when it's time to change the sensor. One month supply will include two sensors.  Choose a site:   At least 3 inches from any injection site  Away from scarring, tattoos, irritation, and bones  Unlikely to be bumped, pushed, or laid on while sleeping  To apply the sensor, press firmly and listen for the click. Pull back slowly after a few seconds.   Patient may purchase sensor covers if concerned about sensor falling off or use a band-aid.  Sensor goes into the subcutaneous (fatty) part of the skin. This can cause a real-time lag. If a finger stick is slightly off from your sensor reading, this is why.     René on Smart Phone:  Will be used to alert you when blood sugar is low or high and to obtain blood sugar readings.   To start a new sensor:  Open the René on smart phone and tap \"start new sensor\"  Scan the sensor by touching it with the top of your smart phone. You'll receive a tone and vibration when you've successfully started it.   When starting a new sensor, there is a 60 minute warm up period before you will get BG readings.  René will notify you when it is time to change your sensor.    https://www.freestyle.[employer]/us-en/freestyle-cassie-3-resources.html     "

## 2024-12-18 NOTE — PROGRESS NOTES
CGM Placement and Training    12/18/2024    Patient: Brad Bella  YOB: 1955    Provider: Matt Gatica    Brad Bella presents today for initial education and set-up of CGM including sensor placement, hookup, calibration of monitor, and patient training as ordered.    Device Name: FreeStyle Cassie 3 - This was patient provided equipment. The patient has obtained a new data  and has therefore returned for repeat setup, training, and data download.    Sensor Placement, Hookup, and Calibration: The FreeStyle Cassie 3 sensor was placed on the back side of the left upper arm. The sensor/transmitter/ were connected and calibrated appropriately.    Education/Training Provided: Education/training was provided in video format and face-to-face on the following topics:    [x] Sensor site selection, rotation, and application  [x] Connecting the sensor/transmitter/  [x] Taping/securing the sensor/transmitter  [x] Differences between interstitial glucose readings and fingerstick blood glucose readings  [x] Calibrating the device including timing, frequency, and importance of accurate meter/fingerstick technique  [x] Understanding CGM data and trends  [x] Basic troubleshooting tips  [x] Using a traditional blood glucose monitor to double-check readings  [x] Preventing overcorrection of high glucose  [x] Removal and disposal of the device  [x] Possible interference of products, such as acetaminophen, hydroxyurea, salicylic acid, and vitamin C    Technique demonstration was provided when needed. Patient instructions on the device were also attached to the After Visit Summary/MyChart for patient reference.    All questions and concerns were addressed and the patient verbalized understanding of the education and materials provided. He will call the office with any additional questions.    Follow-Up: He will return to the office in 15 days with the data  for printing of the captured data  recordings and removal of sensor device (if needed).    Caity Wright  12/18/2024  13:41 EST

## 2024-12-18 NOTE — PROGRESS NOTES
TREATMENT  PREPARATION    Brad Bella  [id number]  1955    Chief Complaint: Treatment preparation and needs assessment    History of present illness:  Brad Bella is a 69 y.o. year old male who is here today for treatment preparation and needs assessment.  The patient has been diagnosed with   Encounter Diagnosis   Name Primary?    Pancreatic adenocarcinoma Yes    and is scheduled to begin treatment with: 5FU, leucovorin, oxaliplatin, irinotecan.    Oncology History:    Oncology/Hematology History   Pancreatic adenocarcinoma   12/13/2024 Initial Diagnosis    Pancreatic adenocarcinoma     12/23/2024 -  Chemotherapy    OP PANCREATIC FOLFIRINOX OXALIplatin / Leucovorin / Irinotecan / Fluorouracil         The current medication list and allergy list were reviewed and reconciled.     Past Medical History, Past Surgical History, Social History, Family History have been reviewed and are without significant changes except as mentioned.    Physical Exam:    Vitals:    12/18/24 1501   BP: 124/70   Pulse: 75   Resp: 16   Temp: 97.9 °F (36.6 °C)   SpO2: 97%     Vitals:    12/18/24 1501   PainSc: 0-No pain        ECOG score: 0         Physical Exam  Constitutional:       Appearance: Normal appearance.   Cardiovascular:      Rate and Rhythm: Normal rate and regular rhythm.   Pulmonary:      Effort: Pulmonary effort is normal.      Breath sounds: Normal breath sounds.   Neurological:      Mental Status: He is oriented to person, place, and time.       NEEDS ASSESSMENTS    Psychosocial and Barriers to care  The patient has completed a PHQ-9 Depression Screening and the Distress Thermometer (DT) today.  PHQ-9 results show PHQ-2 Total Score:   PHQ-9 Total Score:        The patient scored their distress today as Distress Level: 0-No distress on a scale of 0-10 with 0 being no distress and 10 being extreme distress. Problems marked by the patient as being an issue for them within the last week include   .      Results were  "reviewed along with psychosocial resources offered by our cancer center.  Our Supportive Oncology team will be flagged for a score of 4 or above, and a same day call will be made for a score of 9 or 10.  A mental health referral is offered at that time. Patients who score less than 4 have been educated on our support services and can be referred to our  upon request.  The patient will not be referred to our .       Nutrition  The patient has completed the malnutrition screening today. They scored Malnutrition Screening Tool  Have you recently lost weight without trying?  If yes, how much weight have you lost?: 1--> Yes, 2-13 lbs  Have you been eating poorly because of a decreased appetite?: 0--> No  MST score: 1   with a score of 0-1 meaning not at risk in a score of 2 or greater meaning at risk.  Patients with a score of 3 or higher will be referred to our oncology dietitian for support. Patients beginning at risk treatment regimens or who have dietary concerns will also be referred to our oncology dietitian. The patient will be referred.      Intravenous Access Assessment  The patient and I discussed planned intravenous chemo/biotherapy as well as other IV treatments that are often needed throughout the course of treatment. These may include, but are not limited to blood transfusions, antibiotics, and IV hydration. Discussed that depending on selected treatment and vein assessment, patient may require venous access device (VAD) which could include but not limited to a Mediport or PICC line. Risks and benefits of VADs reviewed. The patient will be treated via Port.    Advanced Care Planning  Advance Care Planning   The patient and I discussed advanced care planning, \"Conversations that Matter\".   This service is offered for development of advance directives with a certified ACP facilitator.  The patient does not have an up-to-date advanced directive. This document is not on file with our " office. The patient is not interested in an appointment with one of our facilitators to create or update their advanced directives.    Have you reviewed your Advance Directive and is it valid for this stay?: Not applicable        Smoking cessation  Tobacco Use: High Risk (12/18/2024)    Patient History     Smoking Tobacco Use: Every Day     Smokeless Tobacco Use: Never     Passive Exposure: Not on file         Survivorship   When appropriate, we discussed that we will refer the patient to survivorship clinic to discuss next steps following completion of planned treatment.  Reviewed this visit will include assessment of your physical, psychological, functional, and spiritual needs as a survivor and the need at attend this visit when scheduled.    TREATMENT EDUCATION    Today I met with the patient to discuss the chemo/biotherapy regimen recommended for treatment of Pancreatic adenocarcinoma  .  The patient was given explanation of treatment premed side effects including office policy that prohibits patients to drive if sedating medications are administered, MD explanation given regarding benefits, side effects, toxicities and goals of treatment.  The patient received a Chemotherapy/Biotherapy Plan Summary including diagnosis and explanation of specific treatment plan.    SIDE EFFECTS:  Common side effects were discussed with the patient and/or significant other.  Discussion included where applicable hair loss/discoloration, anemia/fatigue, infection/chills/fever, appetite, bleeding risk/precautions, constipation, diarrhea, mouth sores, taste alteration, loss of appetite, nausea/vomiting, peripheral neuropathy, skin/nail changes, rash, muscle aches/weakness, photosensitivity, weight gain/loss, hearing loss, dizziness, menopausal symptoms, menstrual irregularity, sterility, high blood pressure, heart damage, liver damage, lung damage, kidney damage, DVT/PE risk, fluid retention, pleural/pericardial effusion,  somnolence, electrolyte/LFT imbalance, vein exercises and/or the possible need for vascular access/port placement.  The patient was advised that although uncommon, leakage of an infused medication from the vein or venous access device may lead to skin breakdown and/or other tissue damage.  The patient was advised that he/she may have pain, bleeding, and/or bruising from the insertion of a needle in their vein or venous access device (port).  The patient was further advised that, in spite of proper technique, infection with redness and irritation may rarely occur at the site where the needle was inserted.  The patient was advised that if complications occur, additional medical treatment is available.  Finally, where applicable we have reviewed rare but potential immune mediated side effects including shortness of breath, cough, chest pain (pneumonitis), abdominal pain, diarrhea (colitis), thyroiditis (hypothyroid or hyperthyroid), hepatitis and liver dysfunction, nephritis and renal dysfunction.    Discussion also included side effects specific to drugs in the treatment plan, specifically:    Treatment Plans       Name Type Plan Dates Plan Provider         Active    OP PANCREATIC FOLFIRINOX OXALIplatin / Leucovorin / Irinotecan / Fluorouracil ONCOLOGY TREATMENT 12/22/2024 - Present Reed Castillo II, MD                      Questions answered and additional information discussed on topics including:  Anemia, Thrombocytopenia, Neutropenia, Nutrition and appetite changes, Constipation, Diarrhea, Nausea & vomiting, Mouth sores, Alopecia, Nervous system changes, Pain, Skin & nail changes, and Organ toxicities       Assessment and Plan:    Diagnoses and all orders for this visit:    1. Pancreatic adenocarcinoma (Primary)      No orders of the defined types were placed in this encounter.    The patient and I have reviewed their diagnosis and scheduled treatment plan. Needs assessment was completed where applicable including  genetics, psychosocial needs, barriers to care, VAD evaluation, advanced care planning, survivorship, and palliative care services where indicated. Referrals have been ordered as appropriate based upon evaluation today and patient desires.   Chemo/biotherapy teaching was completed today and consent obtained. See separate documentation for further details.  Adequate time was given to answer questions.  Patient made aware of their care team members and contact information if they have questions or problems throughout the treatment course.  Discussion held and written information provided describing frequency of office visits and ongoing monitoring throughout the treatment plan.     Reviewed with patient any prescribed medication sent to pharmacy.  Education provided regarding proper storage, safe handling, and proper disposal of unused medication.  Proper handling of body fluids and waste discussed and written information provided.  If appropriate, patient had pretreatment labs drawn today.    Learning assessment completed at initial patient encounter. See separate flowsheet. Chemo/biotherapy education comprehension assessed at today's visit.    I spent 80 minutes caring for Brad on this date of service. This time includes time spent by me in the following activities: preparing for the visit, reviewing tests, obtaining and/or reviewing a separately obtained history, performing a medically appropriate examination and/or evaluation, counseling and educating the patient/family/caregiver, ordering medications, tests, or procedures, referring and communicating with other health care professionals, documenting information in the medical record, independently interpreting results and communicating that information with the patient/family/caregiver, and care coordination.     Rossy Puente, APRN   12/18/24

## 2024-12-18 NOTE — PROGRESS NOTES
[facility] Hematology/Oncology Treatment Plan Summary    [facility] Bella  [id number]  MD: Dr. Castillo    Diagnosis:     ICD-10-CM ICD-9-CM   1. Pancreatic adenocarcinoma  C25.9 157.9     Goal of treatment: curative intent    Treatment Medication(s):   Fluorouracil   Luecovorin   Irinotecan   Oxaliplatin     Frequency: Every 2 weeks     Number of cycles: 12    Starting on: TBD     Repeat after every 2 months: CT Scan    Items for home use: Senokot-S (for constipation), Milk of Magnesia (for constipation), Imodium AD (for diarrhea), Tylenol (for fever and/or pain), and Thermometer    Rx written for: [x] Nausea    [] Pre-Treatment   EMLA cream to port site 30 minutes prior to access and ondansetron 8 mg by mouth every 8 hours as needed for nausea    Notes:     Next Steps: Port placement, date TBD.     Completing Provider: EVA Harper           Date/time: 12/18/2024      Please note: You will be seen by a provider frequently with your treatment plan. This plan may change depending on many factors, if so, this will be discussed with you by your physician.  Last update 03/2022.

## 2024-12-23 ENCOUNTER — TELEPHONE (OUTPATIENT)
Dept: ONCOLOGY | Facility: CLINIC | Age: 69
End: 2024-12-23
Payer: MEDICARE

## 2024-12-23 NOTE — TELEPHONE ENCOUNTER
----- Message from Velia MORALES sent at 2024 11:08 AM EST -----  Hello,     Update: Patients port is being placed by Dr. Gutierrez (placed not consult) on .     Carla- Please schedule the followin/31- labs/ NP/FOLFIRINOX at [facility]    - Labs/NP/FOLFIRINOX     - Labs/NP/FOLFIRINOX    - Labs/ CODE/ FOLFIRINOX     or - CT scans    - 2 unit video visit for scan review as Dr. Castillo will be rounding - labs and FOLFIRINOX only     I will send you additional days in a bit, but wanted him added on now that we know about port and he does indeed want treatment     Thank you  ----- Message -----  From: Reed Castillo II, MD  Sent: 2024   6:47 PM EST  To: Velia Trejo RN; EVA Harper,    I see that he had chemo education today but I do not see any further follow-up planned.      This is an excerpt from my last note  · If he decides he wants treatment, he will need   º Referral to Dr. Gutierrez for port placement.  · If he wants treatment, plan to begin FOLFIRINOX on  at [place]  · If he wants treatment, he will need at least weekly CMP until we get started and a few days before the first chemo.  Bilirubin needs to be below 2 for Irinotecan (see orange font above).  · If he wants treatment, we will need to tentatively plan all 12 cycles (all 6 months), with CT after every 2 months      If he wants therapy, please make sure he has appointments for all 12 cycles (all 6 months), with CT after every 2 months.  I suspect he is still deciding.  If so, Kelsy, we will check in with him periodically?    Also, Kelsy, please make sure he knows that Chris checked with our billing department and his insurance is expected to cover his medical expenses for treatment    Thanks!

## 2024-12-30 NOTE — PROGRESS NOTES
.     REASON FOR FOLLOWUP :   pancreatic adenocarcinoma (localized.  Goal is cure)    HISTORY OF PRESENT ILLNESS:  The patient is a 69 y.o. year old male  who is here for follow-up with the above-mentioned history.    Here for follow-up prior to cycle 1 FOLFIRINOX.  Underwent port placement 12/30/2024.  Chemotherapy education completed 12/18/2024.     He is seen today with a family member present.  They were seen by Dr. Gutierrez yesterday and he recommended starting Creon, however they are having difficulty with the cost of Creon.  He continues to have difficulty with weight gain, and Dr. Gutierrez thought this could be helpful.  We also plan to have him see the oncology dietitian today.  He reports a good appetite, despite continuing to lose weight.  Denies nausea or vomiting.  Denies fever or chills.  Denies new or worsening pain.    Past Medical History:   Diagnosis Date    Diabetes mellitus     Kidney stone 09/05/2020     Past Surgical History:   Procedure Laterality Date    URETEROSCOPY LASER LITHOTRIPSY WITH STENT INSERTION Left 09/18/2020    Procedure: LFT URETEROSCOPY LASER LITHOTRIPSY WITH STENT INSERTION;  Surgeon: James Torres MD;  Location: [facility];  Service: Urology;  Laterality: Left;    VASECTOMY         MEDICATIONS    Current Outpatient Medications:     Blood Glucose Monitoring Suppl (ONE TOUCH ULTRA 2) w/Device kit, 1 time daily before breakfast, Disp: 1 each, Rfl: 0    Continuous Glucose Sensor (FreeStyle Cassie 3 Sensor) misc, Use 1 Units Every 14 (Fourteen) Days., Disp: 2 each, Rfl: 2    Glucagon (Gvoke HypoPen 2-Pack) 1 MG/0.2ML solution auto-injector, Inject 1 mg under the skin into the appropriate area as directed Daily As Needed (hypoglycemia)., Disp: 0.4 mL, Rfl: 1    glucose blood test strip, 1 times daily before breakfast, Disp: 100 each, Rfl: 3    insulin aspart (NovoLOG FlexPen) 100 UNIT/ML solution pen-injector sc pen, Inject 2 Units under the skin into the  appropriate area as directed 3 (Three) Times a Day With Meals., Disp: 15 mL, Rfl: 1    Insulin Aspart, w/Niacinamide, (Fiasp FlexTouch) 100 UNIT/ML solution pen-injector, Inject 2 Units under the skin into the appropriate area as directed 3 (Three) Times a Day., Disp: 15 mL, Rfl: 2    Insulin Degludec (Tresiba FlexTouch) 200 UNIT/ML solution pen-injector pen injection, Inject 8 Units under the skin into the appropriate area as directed Every Night., Disp: 3.6 mL, Rfl: 3    Insulin Pen Needle (BD Pen Needle Norma 2nd Gen) 32G X 4 MM misc, Use 1 Units 4 (Four) Times a Day., Disp: 100 each, Rfl: 0    lidocaine-prilocaine (EMLA) 2.5-2.5 % cream, Apply 1 Application topically to the appropriate area as directed As Needed for Mild Pain or Injection Site Pain., Disp: 30 g, Rfl: 3    ondansetron (ZOFRAN) 8 MG tablet, Take 1 tablet by mouth 3 (Three) Times a Day As Needed for Nausea or Vomiting., Disp: 30 tablet, Rfl: 5    OneTouch UltraSoft 2 Lancets misc, Use 1 each Every Morning Before Breakfast., Disp: 100 each, Rfl: 2    glucose (B-D GLUCOSE) 5 g chewable tablet, Chew 3 tablets As Needed for Low Blood Sugar. Take 3 tablets for low blood sugar less than 80 (Patient not taking: Reported on 12/10/2024), Disp: 50 tablet, Rfl: 2    Pancrelipase, Lip-Prot-Amyl, (Creon) 30306-024976 units capsule delayed-release particles capsule, Take 1 capsule by mouth 3 (Three) Times a Day With Meals. And take 2 capsules with snacks., Disp: 450 capsule, Rfl: 1  No current facility-administered medications for this visit.    Facility-Administered Medications Ordered in Other Visits:     atropine sulfate injection 0.25 mg, 0.25 mg, Intravenous, Q15 Min PRN, Judi Chance APRN    dexAMETHasone (DECADRON) IVPB 12 mg, 12 mg, Intravenous, Once, Judi Chance APRN    dextrose (D5W) 5 % infusion, 20 mL/hr, Intravenous, Once, Judi Chance APRN    FOSAPREPITANT 150 MG/100ML NORMAL SALINE (CBC) IVPB 100 mL 100 mL, 150 mg, Intravenous, Once,  "Judi Chance APRN    palonosetron (ALOXI) injection 0.25 mg, 0.25 mg, Intravenous, Once, Judi Chance APRN    ALLERGIES:   No Known Allergies    SOCIAL HISTORY:       Social History     Socioeconomic History    Marital status:      Spouse name: Debbie   Tobacco Use    Smoking status: Every Day     Types: Pipe    Smokeless tobacco: Never   Vaping Use    Vaping status: Never Used   Substance and Sexual Activity    Alcohol use: Not Currently    Drug use: Never    Sexual activity: Yes     Partners: Female     Birth control/protection: Vasectomy         FAMILY HISTORY:  Family History   Problem Relation Age of Onset    Diabetes Mother     Heart attack Father     Pancreatic cancer Sister 69       REVIEW OF SYSTEMS:  Review of Systems   Constitutional:  Negative for activity change.   HENT:  Negative for nosebleeds and trouble swallowing.    Respiratory:  Negative for shortness of breath and wheezing.    Cardiovascular:  Negative for chest pain and palpitations.   Gastrointestinal:  Negative for constipation, diarrhea and nausea.   Genitourinary:  Negative for dysuria and hematuria.   Musculoskeletal:  Negative for arthralgias and myalgias.   Neurological:  Negative for seizures and syncope.   Hematological:  Negative for adenopathy. Does not bruise/bleed easily.   Psychiatric/Behavioral:  Negative for confusion.               Vitals:    12/31/24 0940   BP: 115/67   Pulse: 79   Resp: 16   Temp: 97.9 °F (36.6 °C)   TempSrc: Oral   SpO2: 97%   Weight: 65.3 kg (144 lb)   Height: 180 cm (70.87\")   PainSc: 0-No pain             12/31/2024     9:35 AM   Current Status   ECOG score 0      PHYSICAL EXAM:      CONSTITUTIONAL:  Vital signs reviewed.  No distress, looks comfortable.  EYES:  Conjunctiva and lids unremarkable.  PERRLA  EARS,NOSE,MOUTH,THROAT:  Ears and nose appear unremarkable.  Lips, teeth, gums appear unremarkable.  RESPIRATORY:  Normal respiratory effort.  Lungs clear to auscultation " bilaterally.  CARDIOVASCULAR:  Normal S1, S2.  No murmurs rubs or gallops.  No significant lower extremity edema.  GASTROINTESTINAL: Abdomen appears unremarkable.  Nontender.  No hepatomegaly.  No splenomegaly.  LYMPHATIC:  No cervical, supraclavicular, axillary lymphadenopathy.  SKIN:  Warm.  No rashes.  PSYCHIATRIC:  Normal judgment and insight.  Normal mood and affect.    RECENT LABS:        WBC   Date Value Ref Range Status   12/31/2024 10.14 3.40 - 10.80 10*3/mm3 Final   12/03/2024 9.67 3.40 - 10.80 10*3/mm3 Final   11/25/2024 7.6 3.4 - 10.8 x10E3/uL Final     Comment:     **Effective December 2, 2024 profile [id number] WBC will be made**    non-orderable as a stand-alone order code.     09/18/2020 17.04 (H) 3.40 - 10.80 10*3/mm3 Final   09/05/2020 13.70 (H) 3.40 - 10.80 10*3/mm3 Final     Hemoglobin   Date Value Ref Range Status   12/31/2024 12.3 (L) 13.0 - 17.7 g/dL Final   12/03/2024 13.1 13.0 - 17.7 g/dL Final   11/25/2024 14.2 13.0 - 17.7 g/dL Final   09/18/2020 14.1 13.0 - 17.7 g/dL Final   09/05/2020 14.7 13.0 - 17.7 g/dL Final     Platelets   Date Value Ref Range Status   12/31/2024 444 140 - 450 10*3/mm3 Final   12/03/2024 459 (H) 140 - 450 10*3/mm3 Final   11/25/2024 435 150 - 450 x10E3/uL Final   09/18/2020 517 (H) 140 - 450 10*3/mm3 Final   09/05/2020 448 140 - 450 10*3/mm3 Final       Assessment & Plan   There are no diagnoses linked to this encounter.    Brad Bella   *Pancreatic adenocarcinoma (head of pancreas)   Reviewed PCP note from 11/26/2024: New onset diabetes, elevated LFTs, bilirubin, and alkaline phosphatase.  CT AP with contrast, 11/26/2024, from 9/18/2020: Pancreatic head mass, 2.7 x 2.2 x 2.1 cm.  Abnormal intrahepatic and extrahepatic biliary ductal dilation compatible with biliary obstruction.  No evidence of liver metastasis.  Scattered small mesenteric and periaortic nodes, not significant by size criteria.  Lung bases: New pulmonary nodule, LLL, 8 mm.  Stable LLL 4 mm nodule and  stable RML pleural-based 3 mm nodule  Neoadjuvant FOLFIRINOX versus Gemzar Abraxane (without XRT)  SWOG  compare these 2 regimens, 12 weeks preoperative and 12 weeks postoperative treatment and 102 patients with potentially resectable pancreatic cancer.  Gemzar Abraxane had better results  Up-to-date recommends neoadjuvant modified FOLFIRINOX followed by chemo XRT if patient is felt to be able to tolerate  Up-to-date authors consider upfront pancreatectomy followed by adjuvant chemotherapy to be an equally accepted approach as compared to upfront chemo XRT followed by pancreatectomy  12/3/2024 (initial consult): Bilirubin up to 19.8 from 6.8 on 11/25/2024.  Discussed with Dr. Monse Gutierrez who offered admission to the patient but patient declined.  He is going to do an ERCP for the patient tomorrow as an outpatient to hopefully obtain a tissue diagnosis and perform biliary stenting to relieve the hyperbilirubinemia.  He will also perform a CT of the chest (chest imaging has not yet been done), and repeat abdominal imaging, three-phase, to assess for vessel involvement to assess for resectability.  He request we arrange the PET scan at [facility] which we will do.  Patient denies neuropathy.  I discussed FOLFIRINOX with the patient as I expect that we will be the regimen I recommend for him.  I reviewed side effects and schedule.  I told him I would defer to Dr. Gutierrez sequencing of surgery and chemo and defer to Dr. Gutierrez if radiation will be recommended  I explained to patient if metastatic disease is found, this is not curable.  Fortunately, no clear evidence of metastatic disease on CT of the abdomen and pelvis.  Patient states if at some point this is determined to not be curable, he will likely want chemo, but wants the main focus to be on quality of life  CT chest and abdomen pelvis three-phase, 12/4/ 24 ([facility]): Multiple bilateral lung nodules..  Radiologist stated differential includes fungal and  inflammatory nodules and lung metastasis.  Branching tubular opacity NERY with morphology favoring chronic mucoid impaction.  Posterior pleural plaquing lower lobes, radiologist states differential includes prior asbestos and perhaps neurogenic tumors such as neurofibromas, radiologist states unlikely to be metastasis.  3.4 x 3.2 cm pancreatic head mass with involvement of periampullary region medial wall of second portion of duodenum and obstruction of pancreatic and common bile ducts.  No vascular involvement or evidence of metastatic disease elsewhere in the abdomen or pelvis.  Numerous cystic lesions throughout the pancreas suggesting multiple sidebranch IPMN's measuring up to 2.9 cm.  EUS ERCP with stent placement x 3 and a sphincterotomy, Dr. Gutierrez, 12/4/ 24: Adenocarcinoma.  PET 12/10/2024: Pancreatic head mass 3.4 x 2.4 cm, SUV 10.2.  No hypermetabolic retroperitoneal LAD.  Nodes at reno hepatis of max SUV 2.4 (mediastinal blood pool SUV was 1.9.) multiple bilateral noncalcified pleural plaques with max SUV 1.4.  Photopenic serpiginous curvilinear nodular consolidation NERY which radiologist stated may represent old mucous plugging.  The only hypermetabolic lung opacity is left apex, nonspecific, 1 cm nodular density with some groundglass adjacently, max SUV 5.4.  Radiologist stated opacity may be infectious or inflammatory but could not exclude malignancy especially considering moderate emphysema findings in the lungs.  Radiologist recommended follow-up noncontrast CT chest 6 to 8 weeks.  Therefore, no clear evidence of metastatic disease.  12/13/2024 office visit: Explained the goal is cure.    Dr. Gutierrez recommends neoadjuvant FOLFIRINOX with CT at [facility] after every 2 months (maximum of 6 months).  Check with him after each CT, with a plan for resection at some point (possibly without any chemoradiation)  Although the goal is cure patient is leaning towards not taking any therapy and instead of  "enrolling in hospice.  Long discussion with him encouraging him to try therapy knowing he can stop at any time.  He is worried about side effects of chemo and worried about out-of-pocket cost.  I have asked our billing department to discuss with him estimated out-of-pocket cost for upcoming treatments.  Patient subsequently decided to proceed with chemotherapy, underwent education on 12/18/2024.  Port placement 12/30/2024.  12/31/2024: C1D1 modified FOLFIRINOX.     *Hyperbilirubinemia  11/25/2024: Total bilirubin 6.8.  Direct bilirubin 4.8.  12/3/2024: Bilirubin 19.8.  Urgent ERCP with stent  12/10/2024: Bilirubin 7  12/13/2024: Bilirubin 5.2  12/31/2024: Bilirubin 1.8, can start irinotecan.  Cannot use Irinotecan until bilirubin <2 (no guidelines for reduction from  regarding AST/ALT)  Okay to use oxaliplatin regardless of bilirubin/LFTs  Okay to use 5-FU with \"mild to moderate hepatic impairment without concomitant renal impairment.  Severe impairment: Use is not recommended\" per .  Gemzar: If bilirubin >1.6, use initial dose 800 mg/m2 and may escalate as tolerated.  Abraxane: Cannot use if bilirubin is >5.  Cannot use if any elevation of AST and bilirubin >1.5    *Family history of pancreatic cancer (sister).  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  I do not see any association of this mutation with pancreatic cancer, but patient had a sister with pancreatic cancer  12/13/2024: Referred to [facility] genetics clinic    *New onset diabetes, likely due to pancreatic cancer  Was due for outpatient endocrinology consult 12/4/ 24, range through PCP.  This will need to be rescheduled due to the urgent need for biliary stenting    *Weight loss  Referred to oncology dietitian on 12/13/2024  Weight today declined further to 144 pounds.  Continue follow-up with " oncology dietitian.  Was also evaluated recently by Dr. Gutierrez who recommended starting Creon to help with weight gain.  Will send in Creon 500 units/kg per meal as Dr. Gutierrez was planning.  Prescription sent to pharmacy and message sent to care coordinators to assist with cost.    Plan:   Proceed with cycle 1 day 1 modified FOLFIRINOX today.  We can proceed with irinotecan today since bilirubin is below 2.0.  Start Creon.  Prescription sent to pharmacy and message sent to care coordinators to assist with cost.  Return in 1 week for toxicity check with NP with possible IV fluids  Continue follow-up with oncology dietitian.  Port placement with Dr. Gutierrez 12/30/2024.    Schedule for MD or NP with modified FOLFIRINOX every 12 weeks x 12 cycles (6 months).  CT every 2 months.  Been referred to genetics clinic.  Following with oncology dietitian.    High risk medication.  Patient reviewed with Dr. Castillo who is in agreement with today's plan of care.    I spent 47 minutes caring for Brad on this date of service. This time includes time spent by me in the following activities: preparing for the visit, reviewing tests, obtaining and/or reviewing a separately obtained history, performing a medically appropriate examination and/or evaluation, counseling and educating the patient/family/caregiver, ordering medications, tests, or procedures, documenting information in the medical record, and care coordination.

## 2024-12-31 ENCOUNTER — INFUSION (OUTPATIENT)
Dept: ONCOLOGY | Facility: HOSPITAL | Age: 69
End: 2024-12-31
Payer: MEDICARE

## 2024-12-31 ENCOUNTER — OFFICE VISIT (OUTPATIENT)
Dept: ONCOLOGY | Facility: CLINIC | Age: 69
End: 2024-12-31
Payer: MEDICARE

## 2024-12-31 ENCOUNTER — CLINICAL SUPPORT (OUTPATIENT)
Dept: OTHER | Facility: HOSPITAL | Age: 69
End: 2024-12-31
Payer: MEDICARE

## 2024-12-31 VITALS — HEART RATE: 68 BPM | RESPIRATION RATE: 16 BRPM | OXYGEN SATURATION: 95 %

## 2024-12-31 VITALS
HEIGHT: 71 IN | WEIGHT: 144 LBS | TEMPERATURE: 97.9 F | OXYGEN SATURATION: 97 % | DIASTOLIC BLOOD PRESSURE: 67 MMHG | SYSTOLIC BLOOD PRESSURE: 115 MMHG | RESPIRATION RATE: 16 BRPM | BODY MASS INDEX: 20.16 KG/M2 | HEART RATE: 79 BPM

## 2024-12-31 DIAGNOSIS — R63.4 UNINTENTIONAL WEIGHT LOSS: ICD-10-CM

## 2024-12-31 DIAGNOSIS — C25.9 PANCREATIC ADENOCARCINOMA: Primary | ICD-10-CM

## 2024-12-31 DIAGNOSIS — Z79.899 HIGH RISK MEDICATION USE: ICD-10-CM

## 2024-12-31 DIAGNOSIS — C25.9 PANCREATIC ADENOCARCINOMA: ICD-10-CM

## 2024-12-31 PROBLEM — Z45.9 ENCOUNTER FOR MANAGEMENT OF IMPLANTED DEVICE: Status: ACTIVE | Noted: 2024-12-31

## 2024-12-31 LAB
ALBUMIN SERPL-MCNC: 3.9 G/DL (ref 3.5–5.2)
ALBUMIN/GLOB SERPL: 1.4 G/DL
ALP SERPL-CCNC: 106 U/L (ref 39–117)
ALT SERPL W P-5'-P-CCNC: 34 U/L (ref 1–41)
ANION GAP SERPL CALCULATED.3IONS-SCNC: 9.5 MMOL/L (ref 5–15)
AST SERPL-CCNC: 25 U/L (ref 1–40)
BASOPHILS # BLD AUTO: 0.01 10*3/MM3 (ref 0–0.2)
BASOPHILS NFR BLD AUTO: 0.1 % (ref 0–1.5)
BILIRUB SERPL-MCNC: 1.8 MG/DL (ref 0–1.2)
BUN SERPL-MCNC: 10 MG/DL (ref 8–23)
BUN/CREAT SERPL: 14.9 (ref 7–25)
CALCIUM SPEC-SCNC: 9.1 MG/DL (ref 8.6–10.5)
CHLORIDE SERPL-SCNC: 104 MMOL/L (ref 98–107)
CO2 SERPL-SCNC: 26.5 MMOL/L (ref 22–29)
CREAT SERPL-MCNC: 0.67 MG/DL (ref 0.76–1.27)
DEPRECATED RDW RBC AUTO: 46.9 FL (ref 37–54)
EGFRCR SERPLBLD CKD-EPI 2021: 101.1 ML/MIN/1.73
EOSINOPHIL # BLD AUTO: 0.19 10*3/MM3 (ref 0–0.4)
EOSINOPHIL NFR BLD AUTO: 1.9 % (ref 0.3–6.2)
ERYTHROCYTE [DISTWIDTH] IN BLOOD BY AUTOMATED COUNT: 14.1 % (ref 12.3–15.4)
GLOBULIN UR ELPH-MCNC: 2.8 GM/DL
GLUCOSE SERPL-MCNC: 140 MG/DL (ref 65–99)
HCT VFR BLD AUTO: 36.9 % (ref 37.5–51)
HGB BLD-MCNC: 12.3 G/DL (ref 13–17.7)
IMM GRANULOCYTES # BLD AUTO: 0.05 10*3/MM3 (ref 0–0.05)
IMM GRANULOCYTES NFR BLD AUTO: 0.5 % (ref 0–0.5)
LYMPHOCYTES # BLD AUTO: 1.8 10*3/MM3 (ref 0.7–3.1)
LYMPHOCYTES NFR BLD AUTO: 17.8 % (ref 19.6–45.3)
MCH RBC QN AUTO: 30.1 PG (ref 26.6–33)
MCHC RBC AUTO-ENTMCNC: 33.3 G/DL (ref 31.5–35.7)
MCV RBC AUTO: 90.2 FL (ref 79–97)
MONOCYTES # BLD AUTO: 0.75 10*3/MM3 (ref 0.1–0.9)
MONOCYTES NFR BLD AUTO: 7.4 % (ref 5–12)
NEUTROPHILS NFR BLD AUTO: 7.34 10*3/MM3 (ref 1.7–7)
NEUTROPHILS NFR BLD AUTO: 72.3 % (ref 42.7–76)
NRBC BLD AUTO-RTO: 0 /100 WBC (ref 0–0.2)
PLATELET # BLD AUTO: 444 10*3/MM3 (ref 140–450)
PMV BLD AUTO: 10.7 FL (ref 6–12)
POTASSIUM SERPL-SCNC: 3.9 MMOL/L (ref 3.5–5.2)
PROT SERPL-MCNC: 6.7 G/DL (ref 6–8.5)
RBC # BLD AUTO: 4.09 10*6/MM3 (ref 4.14–5.8)
SODIUM SERPL-SCNC: 140 MMOL/L (ref 136–145)
WBC NRBC COR # BLD AUTO: 10.14 10*3/MM3 (ref 3.4–10.8)

## 2024-12-31 PROCEDURE — 96375 TX/PRO/DX INJ NEW DRUG ADDON: CPT

## 2024-12-31 PROCEDURE — 96367 TX/PROPH/DG ADDL SEQ IV INF: CPT

## 2024-12-31 PROCEDURE — G0498 CHEMO EXTEND IV INFUS W/PUMP: HCPCS

## 2024-12-31 PROCEDURE — 85025 COMPLETE CBC W/AUTO DIFF WBC: CPT

## 2024-12-31 PROCEDURE — 25010000002 FOSAPREPITANT PER 1 MG: Performed by: NURSE PRACTITIONER

## 2024-12-31 PROCEDURE — 96417 CHEMO IV INFUS EACH ADDL SEQ: CPT

## 2024-12-31 PROCEDURE — 25010000002 PALONOSETRON PER 25 MCG: Performed by: NURSE PRACTITIONER

## 2024-12-31 PROCEDURE — 25010000002 FLUOROURACIL PER 500 MG: Performed by: INTERNAL MEDICINE

## 2024-12-31 PROCEDURE — 25010000002 IRINOTECAN PER 20 MG: Performed by: INTERNAL MEDICINE

## 2024-12-31 PROCEDURE — 25010000002 OXALIPLATIN PER 0.5 MG: Performed by: INTERNAL MEDICINE

## 2024-12-31 PROCEDURE — 80053 COMPREHEN METABOLIC PANEL: CPT

## 2024-12-31 PROCEDURE — 25010000003 DEXTROSE 5 % SOLUTION: Performed by: NURSE PRACTITIONER

## 2024-12-31 PROCEDURE — 25010000002 LEUCOVORIN CALCIUM PER 50 MG: Performed by: INTERNAL MEDICINE

## 2024-12-31 PROCEDURE — 96413 CHEMO IV INFUSION 1 HR: CPT

## 2024-12-31 PROCEDURE — 96415 CHEMO IV INFUSION ADDL HR: CPT

## 2024-12-31 PROCEDURE — 25810000003 SODIUM CHLORIDE 0.9 % SOLUTION 500 ML FLEX CONT: Performed by: INTERNAL MEDICINE

## 2024-12-31 PROCEDURE — 96366 THER/PROPH/DIAG IV INF ADDON: CPT

## 2024-12-31 PROCEDURE — 96368 THER/DIAG CONCURRENT INF: CPT

## 2024-12-31 PROCEDURE — 25010000003 DEXTROSE 5 % SOLUTION 250 ML FLEX CONT: Performed by: INTERNAL MEDICINE

## 2024-12-31 PROCEDURE — 25010000002 DEXAMETHASONE SODIUM PHOSPHATE 100 MG/10ML SOLUTION: Performed by: NURSE PRACTITIONER

## 2024-12-31 PROCEDURE — 25810000003 SODIUM CHLORIDE 0.9 % SOLUTION 250 ML FLEX CONT: Performed by: INTERNAL MEDICINE

## 2024-12-31 RX ORDER — HEPARIN SODIUM (PORCINE) LOCK FLUSH IV SOLN 100 UNIT/ML 100 UNIT/ML
500 SOLUTION INTRAVENOUS AS NEEDED
Status: CANCELLED | OUTPATIENT
Start: 2024-12-31

## 2024-12-31 RX ORDER — DEXTROSE MONOHYDRATE 50 MG/ML
20 INJECTION, SOLUTION INTRAVENOUS ONCE
Status: COMPLETED | OUTPATIENT
Start: 2024-12-31 | End: 2024-12-31

## 2024-12-31 RX ORDER — PALONOSETRON 0.05 MG/ML
0.25 INJECTION, SOLUTION INTRAVENOUS ONCE
Status: COMPLETED | OUTPATIENT
Start: 2024-12-31 | End: 2024-12-31

## 2024-12-31 RX ORDER — SODIUM CHLORIDE 0.9 % (FLUSH) 0.9 %
10 SYRINGE (ML) INJECTION AS NEEDED
Status: CANCELLED | OUTPATIENT
Start: 2024-12-31

## 2024-12-31 RX ORDER — FAMOTIDINE 10 MG/ML
20 INJECTION, SOLUTION INTRAVENOUS AS NEEDED
Status: CANCELLED | OUTPATIENT
Start: 2024-12-31

## 2024-12-31 RX ORDER — DIPHENHYDRAMINE HYDROCHLORIDE 50 MG/ML
50 INJECTION INTRAMUSCULAR; INTRAVENOUS AS NEEDED
Status: CANCELLED | OUTPATIENT
Start: 2024-12-31

## 2024-12-31 RX ORDER — HYDROCORTISONE SODIUM SUCCINATE 100 MG/2ML
100 INJECTION INTRAMUSCULAR; INTRAVENOUS AS NEEDED
Status: CANCELLED | OUTPATIENT
Start: 2024-12-31

## 2024-12-31 RX ADMIN — FOSAPREPITANT 100 ML: 150 INJECTION, POWDER, LYOPHILIZED, FOR SOLUTION INTRAVENOUS at 11:12

## 2024-12-31 RX ADMIN — FLUOROURACIL 4460 MG: 50 INJECTION, SOLUTION INTRAVENOUS at 16:13

## 2024-12-31 RX ADMIN — DEXTROSE MONOHYDRATE 20 ML/HR: 50 INJECTION, SOLUTION INTRAVENOUS at 10:50

## 2024-12-31 RX ADMIN — PALONOSETRON 0.25 MG: 0.05 INJECTION, SOLUTION INTRAVENOUS at 10:50

## 2024-12-31 RX ADMIN — LEUCOVORIN CALCIUM 740 MG: 350 INJECTION, POWDER, LYOPHILIZED, FOR SOLUTION INTRAMUSCULAR; INTRAVENOUS at 14:03

## 2024-12-31 RX ADMIN — IRINOTECAN HYDROCHLORIDE 280 MG: 20 INJECTION, SOLUTION INTRAVENOUS at 14:33

## 2024-12-31 RX ADMIN — ATROPINE SULFATE 0.25 MG: 0.4 INJECTION, SOLUTION INTRAVENOUS at 13:58

## 2024-12-31 RX ADMIN — OXALIPLATIN 160 MG: 5 INJECTION, SOLUTION, CONCENTRATE INTRAVENOUS at 11:58

## 2024-12-31 RX ADMIN — DEXAMETHASONE SODIUM PHOSPHATE 12 MG: 10 INJECTION, SOLUTION INTRAMUSCULAR; INTRAVENOUS at 10:54

## 2024-12-31 NOTE — NURSING NOTE
Pt arrived for D1C1 modified Folfirinox after being seen by EVA Tatum.  Per parameters notified Dr Code of Bilirubin 1.8 who states ok to proceed with today's treatment as planned. Reviewed and explained today's plan of care.  Pt tolerated treatment without incident. Chemo Ball home care and maintenance handouts reviewed and given to pt and wife.  Chemo spill kit explained and given to pt/wife. Explained to not take home zofran x 48 hours since he received Aloxi here today. Also explained cold sensitivity precautions to pt and wife.  Reviewed immodium dosing/diarrhea intervention with pt and wife. Instructed to call office with any questions or concerns. Pt and wife verbalized understanding and discharged in stable condition with 5FU chemo ball attached securely to port with clamps open.

## 2024-12-31 NOTE — PROGRESS NOTES
"OUTPATIENT ONCOLOGY NUTRITION ASSESSMENT    Patient Name: Brad Bella  YOB: 1955  MRN: [id number]  Assessment Date: 12/31/2024    COMMENTS:  Initial nutrition assessment for pt with pancreatic adenocarcinoma (head of pancreas). Other pertinent medical history includes new onset diabetes.  Pt is receiving his first (of 6) infusion of FOLIFIRINOX.  Labs/Meds from today reviewed. Hgb Aic 8.1, Glu 140, Tbili 1.8. Hgb 12.3. On short and long acting insulin, has  a meter.    Met with pt and his wife today in the infusion room.  They report he has mary trying to eat less sugar to help manage his new diagnosis of diabetes. He is now taking insulin. Appetite is decent but he is having loose stools, sometimes floating stools when he eats a higher fat meal. He is trying to drink Boost Glucose Control supplements. Weight has subsequently lost about 20-25 lbs. He is about to start Creon.      Explained RD role and the importance of adequate nutrition and hydration during treatment.  Encouraged patient to focus on getting adequate calories, protein and nutrients in order to support immune function and nutrition needs. Reviewed examples of high protein foods to include at meals and snacks. Explained relationship of carbs and protein as they relate to blood glucose levels. He can have 60-75 gms carb per meal along with  a good protein source.  Reviewed current and potential future side effects:  N/V/D, constipation, mouth sores, poor appetite, fatigue and taste alteration.      Provided handounts on diabetes and diet, Pancreatic Cancer and Nutrition booklet, and  the [employer] booklet \"Nutrition during Cancer Treatment\" as a resource as well as RD contact info for any questions. Will follow throughout treatment course and be available as needed. Pt and wife very appreciative of visit.        Reason for Assessment Physician referral, New assessment     Diagnosis/Problem   Pancreatic adenocarcinoma " "  Treatment Plan Chemotherapy FOLOFIRINOX   Frequency Q 2 weeks for 12 weeks; Poss partial pancreatectomy after chemo   Goal of cancer treatment Curative, Neoadjuvant   Comments:          Encounter Information        Nutrition/Diet History:  Trying to eat low sugar   Oral Nutrition Supplements: Boost GC   Factors/Symptoms Affecting Intake: Decreased appetite, Weight loss, Other  loose stools   Comments:      Medical/Surgical History Past Medical History:   Diagnosis Date    Diabetes mellitus     Kidney stone 09/05/2020       Past Surgical History:   Procedure Laterality Date    URETEROSCOPY LASER LITHOTRIPSY WITH STENT INSERTION Left 09/18/2020    Procedure: LFT URETEROSCOPY LASER LITHOTRIPSY WITH STENT INSERTION;  Surgeon: James Torres MD;  Location: [facility];  Service: Urology;  Laterality: Left;    VASECTOMY                 Anthropometrics        Current Height Ht Readings from Last 1 Encounters:   12/31/24 180 cm (70.87\")      Current Weight Wt Readings from Last 1 Encounters:   12/31/24 65.3 kg (144 lb)      BMI  20.16   Usual Body Weight (UBW) 165-170lb   Weight Change/Trend Loss   Weight History Wt Readings from Last 30 Encounters:   12/31/24 0940 65.3 kg (144 lb)   12/18/24 1501 66.4 kg (146 lb 4.8 oz)   12/13/24 1441 67.9 kg (149 lb 12.8 oz)   12/10/24 0925 69 kg (152 lb 3.2 oz)   12/09/24 1345 69.4 kg (153 lb)   12/03/24 1532 70.2 kg (154 lb 12.8 oz)   11/26/24 1103 72.1 kg (159 lb)   11/25/24 1005 72.2 kg (159 lb 3.2 oz)   09/18/20 0858 76.7 kg (169 lb 3.2 oz)   09/05/20 1005 77.1 kg (170 lb)          Medications           Current medications: FreeStyle Cassie 3 Sensor, Glucagon, Insulin Aspart (w/Niacinamide), Insulin Degludec, Insulin Pen Needle, ONE TOUCH ULTRA 2, OneTouch UltraSoft 2 Lancets, Pancrelipase (Lip-Prot-Amyl), glucose, glucose blood, insulin aspart, lidocaine-prilocaine, and ondansetron                 Tests/Procedures        Tests/Procedures Chemotherapy     Labs     "   Pertinent Labs    Results from last 7 days   Lab Units 12/31/24  0957   SODIUM mmol/L 140   POTASSIUM mmol/L 3.9   CHLORIDE mmol/L 104   CO2 mmol/L 26.5   BUN mg/dL 10   CREATININE mg/dL 0.67*   CALCIUM mg/dL 9.1   BILIRUBIN mg/dL 1.8*   ALK PHOS U/L 106   ALT (SGPT) U/L 34   AST (SGOT) U/L 25   GLUCOSE mg/dL 140*     Results from last 7 days   Lab Units 12/31/24  0957   HEMOGLOBIN g/dL 12.3*   HEMATOCRIT % 36.9*   WBC 10*3/mm3 10.14   ALBUMIN g/dL 3.9     Results from last 7 days   Lab Units 12/31/24  0957   PLATELETS 10*3/mm3 444     COVID19   Date Value Ref Range Status   09/18/2020 Not Detected Not Detected - Ref. Range Final     Lab Results   Component Value Date    HGBA1C 8.1 (H) 11/25/2024          Physical Findings        Physical Appearance alert, loss of muscle mass, loss of subcutaneous fat, oriented, underweight     Edema  no edema   Gastrointestinal Other: looses, floating stools   Tubes/Lines/Drains Implantable Port   Oral/Mouth Cavity WNL   Skin Intact       Estimated/Assessed Needs        Energy Requirements    Height for Calculation      Weight for Calculation 65 kg   Method for Estimation  30 kcal/kg   EST Needs (kcal/day) 1950 kcals per day       Protein Requirements    Weight for Calculation 65 kg   EST Protein Needs (g/kg) 1.2 - 1.5 gm/kg   EST Daily Needs (g/day) 78-97 gms per day       Fluid Requirements     Method for Estimation 1 mL/kcal    Estimated Needs (mL/day) 1900 mLs per day         NUTRITION INTERVENTION / PLAN OF CARE      Intervention Goal(s) Maintain nutrition status, Provide information, Meet estimated needs, Disease management/therapy, Tolerate PO , Increase intake, and Appropriate weight gain         RD Intervention/Action Encouraged intake, Follow Tx progress, Recommended/ordered         Recommendations:       PO Diet Consume calorie and protein dense healthy foods, 6 small meals      Supplements Boost or Ensure      Snacks       Other    New Prescription Ordered? No,  recommend   --      Monitor/Evaluation Follow up as needed   Education Education provided   --    Electronically signed by:  Shania Abbott RD  12/31/24 13:00 EST

## 2025-01-02 ENCOUNTER — INFUSION (OUTPATIENT)
Dept: ONCOLOGY | Facility: HOSPITAL | Age: 70
End: 2025-01-02
Payer: MEDICARE

## 2025-01-02 DIAGNOSIS — Z45.9 ENCOUNTER FOR MANAGEMENT OF IMPLANTED DEVICE: Primary | ICD-10-CM

## 2025-01-02 DIAGNOSIS — C25.9 PANCREATIC ADENOCARCINOMA: ICD-10-CM

## 2025-01-02 DIAGNOSIS — Z79.899 HIGH RISK MEDICATION USE: ICD-10-CM

## 2025-01-02 PROCEDURE — 25010000002 HEPARIN LOCK FLUSH PER 10 UNITS: Performed by: NURSE PRACTITIONER

## 2025-01-02 RX ORDER — SODIUM CHLORIDE 0.9 % (FLUSH) 0.9 %
10 SYRINGE (ML) INJECTION AS NEEDED
Status: DISCONTINUED | OUTPATIENT
Start: 2025-01-02 | End: 2025-01-02 | Stop reason: HOSPADM

## 2025-01-02 RX ORDER — HEPARIN SODIUM (PORCINE) LOCK FLUSH IV SOLN 100 UNIT/ML 100 UNIT/ML
500 SOLUTION INTRAVENOUS AS NEEDED
Status: DISCONTINUED | OUTPATIENT
Start: 2025-01-02 | End: 2025-01-02 | Stop reason: HOSPADM

## 2025-01-02 RX ORDER — HEPARIN SODIUM (PORCINE) LOCK FLUSH IV SOLN 100 UNIT/ML 100 UNIT/ML
500 SOLUTION INTRAVENOUS AS NEEDED
OUTPATIENT
Start: 2025-01-02

## 2025-01-02 RX ORDER — SODIUM CHLORIDE 0.9 % (FLUSH) 0.9 %
10 SYRINGE (ML) INJECTION AS NEEDED
OUTPATIENT
Start: 2025-01-02

## 2025-01-02 RX ADMIN — Medication 500 UNITS: at 14:54

## 2025-01-02 RX ADMIN — Medication 10 ML: at 14:54

## 2025-01-02 NOTE — NURSING NOTE
Pt arrived for chemo ball unhook without complaints.  Pt reports good appetite and tolerating PO fluids without difficulty and doesn't feel like he needs IVF's. Reviewed tomorrow's appt and discharged in stable condition with his wife.

## 2025-01-03 ENCOUNTER — INFUSION (OUTPATIENT)
Dept: ONCOLOGY | Facility: HOSPITAL | Age: 70
End: 2025-01-03
Payer: MEDICARE

## 2025-01-03 ENCOUNTER — SPECIALTY PHARMACY (OUTPATIENT)
Dept: PHARMACY | Facility: HOSPITAL | Age: 70
End: 2025-01-03
Payer: MEDICARE

## 2025-01-03 DIAGNOSIS — Z79.899 HIGH RISK MEDICATION USE: ICD-10-CM

## 2025-01-03 DIAGNOSIS — C25.9 PANCREATIC ADENOCARCINOMA: Primary | ICD-10-CM

## 2025-01-03 PROCEDURE — 96372 THER/PROPH/DIAG INJ SC/IM: CPT

## 2025-01-03 NOTE — PROGRESS NOTES
I received a request from EVA Chance, asking that I investigate co-pay assistance for Mr. Bella's creon co-pay.     Today I have faxed an application to receive free Creon to [employer] Patient Assistance Program.     I have faxed the application to [phone] and will call [phone] to follow-up on the application.    I have informed Mr. Bella of all of the above, he v/u.     Azalia Arias - Care Coordinator   1/3/2025  13:07 EST

## 2025-01-08 NOTE — PROGRESS NOTES
.     REASON FOR FOLLOWUP :   pancreatic adenocarcinoma (localized.  Goal is cure)    HISTORY OF PRESENT ILLNESS:  The patient is a 69 y.o. year old male  who is here for follow-up with the above-mentioned history.    Here for C2D1 modified FOLFIRINOX.  Seen today with his wife present.  Was unable to come to his tox check last week due to inclement weather.  He experienced cold sensitivity for about 4-5 days following treatment.  Denies any signs or symptoms of peripheral neuropathy.  He also had fatigue that lasted about a week, however this past week was doing a lot of work on his tractor and was able to handle this just fine.  He does note improvement in his appetite, his weight has remained stable.  He fortunately has not lost any additional weight since last visit.  He was able to start on Creon, did have some nausea after starting Creon but this improved.  He has noticed more normal color and consistency of his stool since starting Creon.  Aside from when he first started Creon, denies any issues with nausea.  Denies fever or chills.  Denies new or worsening pain.  No new concerns today.    Past Medical History:   Diagnosis Date    Diabetes mellitus     Kidney stone 09/05/2020     Past Surgical History:   Procedure Laterality Date    URETEROSCOPY LASER LITHOTRIPSY WITH STENT INSERTION Left 09/18/2020    Procedure: LFT URETEROSCOPY LASER LITHOTRIPSY WITH STENT INSERTION;  Surgeon: James Torres MD;  Location: [facility];  Service: Urology;  Laterality: Left;    VASECTOMY         MEDICATIONS    Current Outpatient Medications:     Blood Glucose Monitoring Suppl (ONE TOUCH ULTRA 2) w/Device kit, 1 time daily before breakfast, Disp: 1 each, Rfl: 0    Continuous Glucose Sensor (FreeStyle Cassie 3 Sensor) misc, Use 1 Units Every 14 (Fourteen) Days., Disp: 2 each, Rfl: 2    Glucagon (Gvoke HypoPen 2-Pack) 1 MG/0.2ML solution auto-injector, Inject 1 mg under the skin into the appropriate area as  directed Daily As Needed (hypoglycemia)., Disp: 0.4 mL, Rfl: 1    glucose blood test strip, 1 times daily before breakfast, Disp: 100 each, Rfl: 3    insulin aspart (NovoLOG FlexPen) 100 UNIT/ML solution pen-injector sc pen, Inject 2 Units under the skin into the appropriate area as directed 3 (Three) Times a Day With Meals., Disp: 15 mL, Rfl: 1    Insulin Aspart, w/Niacinamide, (Fiasp FlexTouch) 100 UNIT/ML solution pen-injector, Inject 2 Units under the skin into the appropriate area as directed 3 (Three) Times a Day., Disp: 15 mL, Rfl: 2    Insulin Degludec (Tresiba FlexTouch) 200 UNIT/ML solution pen-injector pen injection, Inject 8 Units under the skin into the appropriate area as directed Every Night., Disp: 3.6 mL, Rfl: 3    Insulin Pen Needle (BD Pen Needle Norma 2nd Gen) 32G X 4 MM misc, Use 1 Units 4 (Four) Times a Day., Disp: 100 each, Rfl: 0    Insulin Pen Needle (BD Pen Needle Norma U/F) 32G X 4 MM misc, Use 1 Units 4 (Four) Times a Day., Disp: 100 each, Rfl: 1    lidocaine-prilocaine (EMLA) 2.5-2.5 % cream, Apply 1 Application topically to the appropriate area as directed As Needed for Mild Pain or Injection Site Pain., Disp: 30 g, Rfl: 3    OneTouch UltraSoft 2 Lancets misc, Use 1 each Every Morning Before Breakfast., Disp: 100 each, Rfl: 2    Pancrelipase, Lip-Prot-Amyl, (Creon) 47735-388872 units capsule delayed-release particles capsule, Take 1 capsule by mouth 3 (Three) Times a Day With Meals. And take 2 capsules with snacks., Disp: 450 capsule, Rfl: 1    glucose (B-D GLUCOSE) 5 g chewable tablet, Chew 3 tablets As Needed for Low Blood Sugar. Take 3 tablets for low blood sugar less than 80 (Patient not taking: Reported on 12/10/2024), Disp: 50 tablet, Rfl: 2    ondansetron (ZOFRAN) 8 MG tablet, Take 1 tablet by mouth 3 (Three) Times a Day As Needed for Nausea or Vomiting. (Patient not taking: Reported on 1/14/2025), Disp: 30 tablet, Rfl: 5    ALLERGIES:   No Known Allergies    SOCIAL HISTORY:      "  Social History     Socioeconomic History    Marital status:      Spouse name: Debbie   Tobacco Use    Smoking status: Every Day     Types: Pipe    Smokeless tobacco: Never   Vaping Use    Vaping status: Never Used   Substance and Sexual Activity    Alcohol use: Not Currently    Drug use: Never    Sexual activity: Yes     Partners: Female     Birth control/protection: Vasectomy         FAMILY HISTORY:  Family History   Problem Relation Age of Onset    Diabetes Mother     Heart attack Father     Pancreatic cancer Sister 69       REVIEW OF SYSTEMS:  Review of Systems   Constitutional:  Negative for activity change.   HENT:  Negative for nosebleeds and trouble swallowing.    Respiratory:  Negative for shortness of breath and wheezing.    Cardiovascular:  Negative for chest pain and palpitations.   Gastrointestinal:  Negative for constipation, diarrhea and nausea.   Genitourinary:  Negative for dysuria and hematuria.   Musculoskeletal:  Negative for arthralgias and myalgias.   Neurological:  Negative for seizures and syncope.   Hematological:  Negative for adenopathy. Does not bruise/bleed easily.   Psychiatric/Behavioral:  Negative for confusion.               Vitals:    01/14/25 0825   BP: 110/73   Pulse: 70   Resp: 16   Temp: 98.1 °F (36.7 °C)   TempSrc: Oral   SpO2: 97%   Weight: 65 kg (143 lb 4.8 oz)   Height: 180 cm (70.87\")   PainSc: 0-No pain               1/14/2025     8:27 AM   Current Status   ECOG score 0      PHYSICAL EXAM:      CONSTITUTIONAL:  Vital signs reviewed.  No distress, looks comfortable.  EYES:  Conjunctiva and lids unremarkable.  PERRLA  EARS,NOSE,MOUTH,THROAT:  Ears and nose appear unremarkable.  Lips, teeth, gums appear unremarkable.  RESPIRATORY:  Normal respiratory effort.  Lungs clear to auscultation bilaterally.  CARDIOVASCULAR:  Normal S1, S2.  No murmurs rubs or gallops.  No significant lower extremity edema.  GASTROINTESTINAL: Abdomen appears unremarkable.  Nontender.  No " hepatomegaly.  No splenomegaly.  LYMPHATIC:  No cervical, supraclavicular, axillary lymphadenopathy.  SKIN:  Warm.  No rashes.  PSYCHIATRIC:  Normal judgment and insight.  Normal mood and affect.    RECENT LABS:        WBC   Date Value Ref Range Status   01/14/2025 14.31 (H) 3.40 - 10.80 10*3/mm3 Final   12/31/2024 10.14 3.40 - 10.80 10*3/mm3 Final   12/03/2024 9.67 3.40 - 10.80 10*3/mm3 Final   11/25/2024 7.6 3.4 - 10.8 x10E3/uL Final     Comment:     **Effective December 2, 2024 profile [id number] WBC will be made**    non-orderable as a stand-alone order code.     09/18/2020 17.04 (H) 3.40 - 10.80 10*3/mm3 Final   09/05/2020 13.70 (H) 3.40 - 10.80 10*3/mm3 Final     Hemoglobin   Date Value Ref Range Status   01/14/2025 11.6 (L) 13.0 - 17.7 g/dL Final   12/31/2024 12.3 (L) 13.0 - 17.7 g/dL Final   12/03/2024 13.1 13.0 - 17.7 g/dL Final   11/25/2024 14.2 13.0 - 17.7 g/dL Final   09/18/2020 14.1 13.0 - 17.7 g/dL Final   09/05/2020 14.7 13.0 - 17.7 g/dL Final     Platelets   Date Value Ref Range Status   01/14/2025 409 140 - 450 10*3/mm3 Final   12/31/2024 444 140 - 450 10*3/mm3 Final   12/03/2024 459 (H) 140 - 450 10*3/mm3 Final   11/25/2024 435 150 - 450 x10E3/uL Final   09/18/2020 517 (H) 140 - 450 10*3/mm3 Final   09/05/2020 448 140 - 450 10*3/mm3 Final       Assessment & Plan   There are no diagnoses linked to this encounter.    Brad Bella   *Pancreatic adenocarcinoma (head of pancreas)   Reviewed PCP note from 11/26/2024: New onset diabetes, elevated LFTs, bilirubin, and alkaline phosphatase.  CT AP with contrast, 11/26/2024, from 9/18/2020: Pancreatic head mass, 2.7 x 2.2 x 2.1 cm.  Abnormal intrahepatic and extrahepatic biliary ductal dilation compatible with biliary obstruction.  No evidence of liver metastasis.  Scattered small mesenteric and periaortic nodes, not significant by size criteria.  Lung bases: New pulmonary nodule, LLL, 8 mm.  Stable LLL 4 mm nodule and stable RML pleural-based 3 mm  nodule  Neoadjuvant FOLFIRINOX versus Gemzar Abraxane (without XRT)  SWOG  compare these 2 regimens, 12 weeks preoperative and 12 weeks postoperative treatment and 102 patients with potentially resectable pancreatic cancer.  Gemzar Abraxane had better results  Up-to-date recommends neoadjuvant modified FOLFIRINOX followed by chemo XRT if patient is felt to be able to tolerate  Up-to-date authors consider upfront pancreatectomy followed by adjuvant chemotherapy to be an equally accepted approach as compared to upfront chemo XRT followed by pancreatectomy  12/3/2024 (initial consult): Bilirubin up to 19.8 from 6.8 on 11/25/2024.  Discussed with Dr. Monse Gutierrez who offered admission to the patient but patient declined.  He is going to do an ERCP for the patient tomorrow as an outpatient to hopefully obtain a tissue diagnosis and perform biliary stenting to relieve the hyperbilirubinemia.  He will also perform a CT of the chest (chest imaging has not yet been done), and repeat abdominal imaging, three-phase, to assess for vessel involvement to assess for resectability.  He request we arrange the PET scan at [facility] which we will do.  Patient denies neuropathy.  I discussed FOLFIRINOX with the patient as I expect that we will be the regimen I recommend for him.  I reviewed side effects and schedule.  I told him I would defer to Dr. Gutierrez sequencing of surgery and chemo and defer to Dr. Gutierrez if radiation will be recommended  I explained to patient if metastatic disease is found, this is not curable.  Fortunately, no clear evidence of metastatic disease on CT of the abdomen and pelvis.  Patient states if at some point this is determined to not be curable, he will likely want chemo, but wants the main focus to be on quality of life  CT chest and abdomen pelvis three-phase, 12/4/ 24 ([facility]): Multiple bilateral lung nodules..  Radiologist stated differential includes fungal and inflammatory nodules and lung  metastasis.  Branching tubular opacity NERY with morphology favoring chronic mucoid impaction.  Posterior pleural plaquing lower lobes, radiologist states differential includes prior asbestos and perhaps neurogenic tumors such as neurofibromas, radiologist states unlikely to be metastasis.  3.4 x 3.2 cm pancreatic head mass with involvement of periampullary region medial wall of second portion of duodenum and obstruction of pancreatic and common bile ducts.  No vascular involvement or evidence of metastatic disease elsewhere in the abdomen or pelvis.  Numerous cystic lesions throughout the pancreas suggesting multiple sidebranch IPMN's measuring up to 2.9 cm.  EUS ERCP with stent placement x 3 and a sphincterotomy, Dr. Gutierrez, 12/4/ 24: Adenocarcinoma.  PET 12/10/2024: Pancreatic head mass 3.4 x 2.4 cm, SUV 10.2.  No hypermetabolic retroperitoneal LAD.  Nodes at reno hepatis of max SUV 2.4 (mediastinal blood pool SUV was 1.9.) multiple bilateral noncalcified pleural plaques with max SUV 1.4.  Photopenic serpiginous curvilinear nodular consolidation NERY which radiologist stated may represent old mucous plugging.  The only hypermetabolic lung opacity is left apex, nonspecific, 1 cm nodular density with some groundglass adjacently, max SUV 5.4.  Radiologist stated opacity may be infectious or inflammatory but could not exclude malignancy especially considering moderate emphysema findings in the lungs.  Radiologist recommended follow-up noncontrast CT chest 6 to 8 weeks.  Therefore, no clear evidence of metastatic disease.  12/13/2024 office visit: Explained the goal is cure.    Dr. Gutierrez recommends neoadjuvant FOLFIRINOX with CT at [facility] after every 2 months (maximum of 6 months).  Check with him after each CT, with a plan for resection at some point (possibly without any chemoradiation)  Although the goal is cure patient is leaning towards not taking any therapy and instead of enrolling in hospice.  Long  "discussion with him encouraging him to try therapy knowing he can stop at any time.  He is worried about side effects of chemo and worried about out-of-pocket cost.  I have asked our billing department to discuss with him estimated out-of-pocket cost for upcoming treatments.  Patient subsequently decided to proceed with chemotherapy, underwent education on 12/18/2024.  Port placement 12/30/2024.  12/31/2024: C1D1 modified FOLFIRINOX.   1/14/2025: Cycle 2-day 1 modified FOLFIRINOX, did experience some fatigue but otherwise good tolerance.    *Hyperbilirubinemia  11/25/2024: Total bilirubin 6.8.  Direct bilirubin 4.8.  12/3/2024: Bilirubin 19.8.  Urgent ERCP with stent  12/10/2024: Bilirubin 7  12/13/2024: Bilirubin 5.2  12/31/2024: Bilirubin 1.8, can start irinotecan.  1/14/2025: bilirubin 0.9.  Cannot use Irinotecan until bilirubin <2 (no guidelines for reduction from  regarding AST/ALT)  Okay to use oxaliplatin regardless of bilirubin/LFTs  Okay to use 5-FU with \"mild to moderate hepatic impairment without concomitant renal impairment.  Severe impairment: Use is not recommended\" per .  Gemzar: If bilirubin >1.6, use initial dose 800 mg/m2 and may escalate as tolerated.  Abraxane: Cannot use if bilirubin is >5.  Cannot use if any elevation of AST and bilirubin >1.5    *Family history of pancreatic cancer (sister).  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  I do not see any association of this mutation with pancreatic cancer, but patient had a sister with pancreatic cancer  12/13/2024: Referred to [facility] genetics clinic    *New onset diabetes, likely due to pancreatic cancer  Following with endocrinology.  On insulin.    *Weight loss  Referred to oncology dietitian on 12/13/2024  Weight today declined further to 144 pounds.  Continue follow-up with oncology " dietitian.  Was also evaluated recently by Dr. Gutierrez who recommended starting Creon to help with weight gain.  Will send in Creon 500 units/kg per meal as Dr. Gutierrez was planning.  Prescription sent to pharmacy and message sent to care coordinators to assist with cost.  1/14/2025: 1/14/2025: Weight today stable at 143 pounds.  Notes improvement in his appetite.    Plan:   Proceed with cycle 2 mFOLFIRINOX  Continue Creon.    Continue follow-up with oncology dietitian.  Port placement with Dr. Gutierrez 12/30/2024.    Schedule for MD or NP with modified FOLFIRINOX every 12 weeks x 12 cycles (6 months).  CT every 2 months.  Been referred to genetics clinic.  Following with oncology dietitian.    High risk medication.

## 2025-01-13 ENCOUNTER — OFFICE VISIT (OUTPATIENT)
Dept: ENDOCRINOLOGY | Age: 70
End: 2025-01-13
Payer: MEDICARE

## 2025-01-13 VITALS
OXYGEN SATURATION: 98 % | TEMPERATURE: 97.8 F | BODY MASS INDEX: 20.02 KG/M2 | WEIGHT: 143 LBS | HEART RATE: 87 BPM | HEIGHT: 71 IN

## 2025-01-13 DIAGNOSIS — Z79.4 TYPE 2 DIABETES MELLITUS WITH HYPERGLYCEMIA, WITH LONG-TERM CURRENT USE OF INSULIN: Primary | ICD-10-CM

## 2025-01-13 DIAGNOSIS — C25.9 PANCREATIC ADENOCARCINOMA: ICD-10-CM

## 2025-01-13 DIAGNOSIS — E11.65 TYPE 2 DIABETES MELLITUS WITH HYPERGLYCEMIA, WITH LONG-TERM CURRENT USE OF INSULIN: Primary | ICD-10-CM

## 2025-01-13 PROCEDURE — 99214 OFFICE O/P EST MOD 30 MIN: CPT | Performed by: INTERNAL MEDICINE

## 2025-01-13 PROCEDURE — 1160F RVW MEDS BY RX/DR IN RCRD: CPT | Performed by: INTERNAL MEDICINE

## 2025-01-13 PROCEDURE — 1159F MED LIST DOCD IN RCRD: CPT | Performed by: INTERNAL MEDICINE

## 2025-01-13 PROCEDURE — 95251 CONT GLUC MNTR ANALYSIS I&R: CPT | Performed by: INTERNAL MEDICINE

## 2025-01-13 RX ORDER — PEN NEEDLE, DIABETIC 32GX 5/32"
1 NEEDLE, DISPOSABLE MISCELLANEOUS 4 TIMES DAILY
Qty: 100 EACH | Refills: 1 | Status: SHIPPED | OUTPATIENT
Start: 2025-01-13

## 2025-01-13 NOTE — PROGRESS NOTES
Chief complaint   Chief Complaint   Patient presents with    Diabetes          Subjective     History of Present Illness:          This is a 69 year old male I am seeing for type 2 DM   referred by PCP   Last OV was 4 weeks ago   He is here for a follow up   other medical history is   1- H/o kidney stones   2- Smoker   3- pancreatic adenocarcinoma, (head of pancreas)  on chemo therapy   , no clear evidence of metastatic disease on CT of the abdomen and pelvis   Pt had T2DM in    Current home medication for diabetes     Tresiba 8 units once a day   Novolog with meals 2 units with meals   the A1c was 8.1 in -  Checks blood sugar at home using senor   Checks blood sugar 1-4 times per day   SBM % in range , 1 % in range   pt states that he is now on Creon  trying his best with dietary Compliance, in regards to Exercise, not on a daily basis and his Weight has been the same and there is No Hypoglycemic episodes, there is no AM Headaches /Nightmares, no Polyuria / Polydipsia, and there os no Blurring of Vision, Last Dilated Pupil Exam, in  , no DM in the eye   NoTingling / numbness in feet, No Dizziness / lightheadedness, No Falls  No Nausea, vomiting / Diarrhea  retired.   Latest Reference Range & Units 24 11:42   Hemoglobin A1C 4.8 - 5.6 % 8.1 (H)   Lipase 13 - 78 U/L 93 (H)   (H): Data is abnormally high  Family History   Problem Relation Age of Onset    Diabetes Mother     Heart attack Father     Pancreatic cancer Sister 69     Social History     Socioeconomic History    Marital status:      Spouse name: Debbie   Tobacco Use    Smoking status: Every Day     Types: Pipe    Smokeless tobacco: Never   Vaping Use    Vaping status: Never Used   Substance and Sexual Activity    Alcohol use: Not Currently    Drug use: Never    Sexual activity: Yes     Partners: Female     Birth control/protection: Vasectomy     Past Medical History:   Diagnosis Date    Diabetes mellitus     Kidney stone  09/05/2020     Past Surgical History:   Procedure Laterality Date    URETEROSCOPY LASER LITHOTRIPSY WITH STENT INSERTION Left 09/18/2020    Procedure: LFT URETEROSCOPY LASER LITHOTRIPSY WITH STENT INSERTION;  Surgeon: James Torres MD;  Location: [facility];  Service: Urology;  Laterality: Left;    VASECTOMY         Current Outpatient Medications:     Blood Glucose Monitoring Suppl (ONE TOUCH ULTRA 2) w/Device kit, 1 time daily before breakfast, Disp: 1 each, Rfl: 0    Continuous Glucose Sensor (FreeStyle Cassie 3 Sensor) misc, Use 1 Units Every 14 (Fourteen) Days., Disp: 2 each, Rfl: 2    Glucagon (Gvoke HypoPen 2-Pack) 1 MG/0.2ML solution auto-injector, Inject 1 mg under the skin into the appropriate area as directed Daily As Needed (hypoglycemia)., Disp: 0.4 mL, Rfl: 1    glucose blood test strip, 1 times daily before breakfast, Disp: 100 each, Rfl: 3    insulin aspart (NovoLOG FlexPen) 100 UNIT/ML solution pen-injector sc pen, Inject 2 Units under the skin into the appropriate area as directed 3 (Three) Times a Day With Meals., Disp: 15 mL, Rfl: 1    Insulin Aspart, w/Niacinamide, (Fiasp FlexTouch) 100 UNIT/ML solution pen-injector, Inject 2 Units under the skin into the appropriate area as directed 3 (Three) Times a Day., Disp: 15 mL, Rfl: 2    Insulin Degludec (Tresiba FlexTouch) 200 UNIT/ML solution pen-injector pen injection, Inject 8 Units under the skin into the appropriate area as directed Every Night., Disp: 3.6 mL, Rfl: 3    Insulin Pen Needle (BD Pen Needle Norma 2nd Gen) 32G X 4 MM misc, Use 1 Units 4 (Four) Times a Day., Disp: 100 each, Rfl: 0    lidocaine-prilocaine (EMLA) 2.5-2.5 % cream, Apply 1 Application topically to the appropriate area as directed As Needed for Mild Pain or Injection Site Pain., Disp: 30 g, Rfl: 3    ondansetron (ZOFRAN) 8 MG tablet, Take 1 tablet by mouth 3 (Three) Times a Day As Needed for Nausea or Vomiting., Disp: 30 tablet, Rfl: 5    OneTouch UltraSoft 2  Lancets misc, Use 1 each Every Morning Before Breakfast., Disp: 100 each, Rfl: 2    Pancrelipase, Lip-Prot-Amyl, (Creon) 27772-632635 units capsule delayed-release particles capsule, Take 1 capsule by mouth 3 (Three) Times a Day With Meals. And take 2 capsules with snacks., Disp: 450 capsule, Rfl: 1    glucose (B-D GLUCOSE) 5 g chewable tablet, Chew 3 tablets As Needed for Low Blood Sugar. Take 3 tablets for low blood sugar less than 80 (Patient not taking: Reported on 1/13/2025), Disp: 50 tablet, Rfl: 2  Patient has no known allergies.    Objective   There were no vitals filed for this visit.         Physical Exam  Neurological:      General: No focal deficit present.      Mental Status: He is alert and oriented to person, place, and time.               Diagnoses and all orders for this visit:    1. Type 2 diabetes mellitus with hyperglycemia, with long-term current use of insulin (Primary)    2. Pancreatic adenocarcinoma  -     CT chest w contrast; Future  -     CT abdomen pelvis w contrast; Future         Assessment:     1- DM, Type 2  New Dx , with High risk secondary to pancreatic disease   labs on file   We discussed the medications  Hypoglycemia and its importance was reviewed   Labs where shown to the patient   2. BP is in good range   3. No lipid panel on file   4. Long term insulin use         Plan:    1. Diet, exercise and weight management  2. Consistent food intake to avoid low blood sugars  3. Home medication includes for diabetes     Stay on Tresiba 8 units once a day    Fiasp 2 units with meals TID   Will send a rx for Glucagon PRN for low Bg     4. Consistent checking of blood sugars using sensor   5. I reviewed the last progress note  6. Annual eye exam  7. Return in 2 months   8. Was Informed that I will not be addressing his pancreatic cancer and that will be with heme/onc  9. Rx sent to the pharmacy  10. Labs : A1c today       I discussed with the patient/legal representative the risks and the  benefits associated with the medications.  The patient/legal representative has been given the opportunity to ask questions.  Alternatives to the proposed treatment (s) were discussed, including the likely results of no treatment.  The patient/legal representative wishes to proceed.       Matt Gatica MD   01/13/25  12:07 EST

## 2025-01-14 ENCOUNTER — INFUSION (OUTPATIENT)
Dept: ONCOLOGY | Facility: HOSPITAL | Age: 70
End: 2025-01-14
Payer: MEDICARE

## 2025-01-14 ENCOUNTER — CLINICAL SUPPORT (OUTPATIENT)
Dept: OTHER | Facility: HOSPITAL | Age: 70
End: 2025-01-14
Payer: MEDICARE

## 2025-01-14 ENCOUNTER — OFFICE VISIT (OUTPATIENT)
Dept: ONCOLOGY | Facility: CLINIC | Age: 70
End: 2025-01-14
Payer: MEDICARE

## 2025-01-14 VITALS
SYSTOLIC BLOOD PRESSURE: 110 MMHG | BODY MASS INDEX: 20.06 KG/M2 | WEIGHT: 143.3 LBS | TEMPERATURE: 98.1 F | DIASTOLIC BLOOD PRESSURE: 73 MMHG | OXYGEN SATURATION: 97 % | RESPIRATION RATE: 16 BRPM | HEART RATE: 70 BPM | HEIGHT: 71 IN

## 2025-01-14 DIAGNOSIS — C25.9 PANCREATIC ADENOCARCINOMA: Primary | ICD-10-CM

## 2025-01-14 DIAGNOSIS — Z79.899 HIGH RISK MEDICATION USE: ICD-10-CM

## 2025-01-14 DIAGNOSIS — C25.9 PANCREATIC ADENOCARCINOMA: ICD-10-CM

## 2025-01-14 LAB
ALBUMIN SERPL-MCNC: 3.7 G/DL (ref 3.5–5.2)
ALBUMIN/GLOB SERPL: 1.3 G/DL
ALP SERPL-CCNC: 151 U/L (ref 39–117)
ALT SERPL W P-5'-P-CCNC: 21 U/L (ref 1–41)
ANION GAP SERPL CALCULATED.3IONS-SCNC: 8.6 MMOL/L (ref 5–15)
AST SERPL-CCNC: 19 U/L (ref 1–40)
BASOPHILS # BLD AUTO: 0.04 10*3/MM3 (ref 0–0.2)
BASOPHILS NFR BLD AUTO: 0.3 % (ref 0–1.5)
BILIRUB SERPL-MCNC: 0.9 MG/DL (ref 0–1.2)
BUN SERPL-MCNC: 9 MG/DL (ref 8–23)
BUN/CREAT SERPL: 15 (ref 7–25)
CALCIUM SPEC-SCNC: 9 MG/DL (ref 8.6–10.5)
CHLORIDE SERPL-SCNC: 106 MMOL/L (ref 98–107)
CO2 SERPL-SCNC: 27.4 MMOL/L (ref 22–29)
CREAT SERPL-MCNC: 0.6 MG/DL (ref 0.76–1.27)
DEPRECATED RDW RBC AUTO: 48.5 FL (ref 37–54)
EGFRCR SERPLBLD CKD-EPI 2021: 104.5 ML/MIN/1.73
EOSINOPHIL # BLD AUTO: 0.24 10*3/MM3 (ref 0–0.4)
EOSINOPHIL NFR BLD AUTO: 1.7 % (ref 0.3–6.2)
ERYTHROCYTE [DISTWIDTH] IN BLOOD BY AUTOMATED COUNT: 14.8 % (ref 12.3–15.4)
GLOBULIN UR ELPH-MCNC: 2.8 GM/DL
GLUCOSE SERPL-MCNC: 102 MG/DL (ref 65–99)
HCT VFR BLD AUTO: 35.7 % (ref 37.5–51)
HGB BLD-MCNC: 11.6 G/DL (ref 13–17.7)
IMM GRANULOCYTES # BLD AUTO: 0.14 10*3/MM3 (ref 0–0.05)
IMM GRANULOCYTES NFR BLD AUTO: 1 % (ref 0–0.5)
LYMPHOCYTES # BLD AUTO: 2.48 10*3/MM3 (ref 0.7–3.1)
LYMPHOCYTES NFR BLD AUTO: 17.3 % (ref 19.6–45.3)
MCH RBC QN AUTO: 29.4 PG (ref 26.6–33)
MCHC RBC AUTO-ENTMCNC: 32.5 G/DL (ref 31.5–35.7)
MCV RBC AUTO: 90.4 FL (ref 79–97)
MONOCYTES # BLD AUTO: 0.94 10*3/MM3 (ref 0.1–0.9)
MONOCYTES NFR BLD AUTO: 6.6 % (ref 5–12)
NEUTROPHILS NFR BLD AUTO: 10.47 10*3/MM3 (ref 1.7–7)
NEUTROPHILS NFR BLD AUTO: 73.1 % (ref 42.7–76)
NRBC BLD AUTO-RTO: 0 /100 WBC (ref 0–0.2)
PLATELET # BLD AUTO: 409 10*3/MM3 (ref 140–450)
PMV BLD AUTO: 10.1 FL (ref 6–12)
POTASSIUM SERPL-SCNC: 3.5 MMOL/L (ref 3.5–5.2)
PROT SERPL-MCNC: 6.5 G/DL (ref 6–8.5)
RBC # BLD AUTO: 3.95 10*6/MM3 (ref 4.14–5.8)
SODIUM SERPL-SCNC: 142 MMOL/L (ref 136–145)
WBC NRBC COR # BLD AUTO: 14.31 10*3/MM3 (ref 3.4–10.8)

## 2025-01-14 PROCEDURE — 96417 CHEMO IV INFUS EACH ADDL SEQ: CPT

## 2025-01-14 PROCEDURE — 25010000003 DEXTROSE 5 % SOLUTION: Performed by: NURSE PRACTITIONER

## 2025-01-14 PROCEDURE — 25010000002 PALONOSETRON PER 25 MCG: Performed by: NURSE PRACTITIONER

## 2025-01-14 PROCEDURE — 25810000003 SODIUM CHLORIDE 0.9 % SOLUTION 500 ML FLEX CONT: Performed by: NURSE PRACTITIONER

## 2025-01-14 PROCEDURE — 25010000002 FLUOROURACIL PER 500 MG: Performed by: NURSE PRACTITIONER

## 2025-01-14 PROCEDURE — 96413 CHEMO IV INFUSION 1 HR: CPT

## 2025-01-14 PROCEDURE — 85025 COMPLETE CBC W/AUTO DIFF WBC: CPT | Performed by: INTERNAL MEDICINE

## 2025-01-14 PROCEDURE — 25010000002 IRINOTECAN PER 20 MG: Performed by: NURSE PRACTITIONER

## 2025-01-14 PROCEDURE — 80053 COMPREHEN METABOLIC PANEL: CPT | Performed by: INTERNAL MEDICINE

## 2025-01-14 PROCEDURE — 25010000002 LEUCOVORIN CALCIUM PER 50 MG: Performed by: NURSE PRACTITIONER

## 2025-01-14 PROCEDURE — 96375 TX/PRO/DX INJ NEW DRUG ADDON: CPT

## 2025-01-14 PROCEDURE — 25810000003 SODIUM CHLORIDE 0.9 % SOLUTION 250 ML FLEX CONT: Performed by: NURSE PRACTITIONER

## 2025-01-14 PROCEDURE — 25010000003 DEXTROSE 5 % SOLUTION 250 ML FLEX CONT: Performed by: NURSE PRACTITIONER

## 2025-01-14 PROCEDURE — 96367 TX/PROPH/DG ADDL SEQ IV INF: CPT

## 2025-01-14 PROCEDURE — 25010000002 DEXAMETHASONE SODIUM PHOSPHATE 100 MG/10ML SOLUTION: Performed by: NURSE PRACTITIONER

## 2025-01-14 PROCEDURE — 96411 CHEMO IV PUSH ADDL DRUG: CPT

## 2025-01-14 PROCEDURE — G0498 CHEMO EXTEND IV INFUS W/PUMP: HCPCS

## 2025-01-14 PROCEDURE — 96368 THER/DIAG CONCURRENT INF: CPT

## 2025-01-14 PROCEDURE — 25010000002 OXALIPLATIN PER 0.5 MG: Performed by: NURSE PRACTITIONER

## 2025-01-14 PROCEDURE — 25010000002 FOSAPREPITANT PER 1 MG: Performed by: NURSE PRACTITIONER

## 2025-01-14 PROCEDURE — 96415 CHEMO IV INFUSION ADDL HR: CPT

## 2025-01-14 RX ORDER — FLUOROURACIL 50 MG/ML
400 INJECTION, SOLUTION INTRAVENOUS ONCE
Status: CANCELLED | OUTPATIENT
Start: 2025-01-14

## 2025-01-14 RX ORDER — ATROPINE SULFATE 1 MG/ML
0.25 INJECTION, SOLUTION INTRAMUSCULAR; INTRAVENOUS; SUBCUTANEOUS
Status: CANCELLED | OUTPATIENT
Start: 2025-01-14

## 2025-01-14 RX ORDER — DEXTROSE MONOHYDRATE 50 MG/ML
20 INJECTION, SOLUTION INTRAVENOUS ONCE
Status: CANCELLED | OUTPATIENT
Start: 2025-01-14

## 2025-01-14 RX ORDER — DIPHENHYDRAMINE HYDROCHLORIDE 50 MG/ML
50 INJECTION INTRAMUSCULAR; INTRAVENOUS AS NEEDED
Status: CANCELLED | OUTPATIENT
Start: 2025-01-14

## 2025-01-14 RX ORDER — HYDROCORTISONE SODIUM SUCCINATE 100 MG/2ML
100 INJECTION INTRAMUSCULAR; INTRAVENOUS AS NEEDED
Status: CANCELLED | OUTPATIENT
Start: 2025-01-14

## 2025-01-14 RX ORDER — PALONOSETRON 0.05 MG/ML
0.25 INJECTION, SOLUTION INTRAVENOUS ONCE
Status: CANCELLED | OUTPATIENT
Start: 2025-01-14

## 2025-01-14 RX ORDER — FLUOROURACIL 50 MG/ML
400 INJECTION, SOLUTION INTRAVENOUS ONCE
Status: COMPLETED | OUTPATIENT
Start: 2025-01-14 | End: 2025-01-14

## 2025-01-14 RX ORDER — FAMOTIDINE 10 MG/ML
20 INJECTION, SOLUTION INTRAVENOUS AS NEEDED
Status: CANCELLED | OUTPATIENT
Start: 2025-01-14

## 2025-01-14 RX ORDER — DEXTROSE MONOHYDRATE 50 MG/ML
20 INJECTION, SOLUTION INTRAVENOUS ONCE
Status: COMPLETED | OUTPATIENT
Start: 2025-01-14 | End: 2025-01-14

## 2025-01-14 RX ORDER — PALONOSETRON 0.05 MG/ML
0.25 INJECTION, SOLUTION INTRAVENOUS ONCE
Status: COMPLETED | OUTPATIENT
Start: 2025-01-14 | End: 2025-01-14

## 2025-01-14 RX ADMIN — DEXTROSE MONOHYDRATE 20 ML/HR: 50 INJECTION, SOLUTION INTRAVENOUS at 09:04

## 2025-01-14 RX ADMIN — PALONOSETRON 0.25 MG: 0.05 INJECTION, SOLUTION INTRAVENOUS at 09:04

## 2025-01-14 RX ADMIN — DEXAMETHASONE SODIUM PHOSPHATE 12 MG: 10 INJECTION, SOLUTION INTRAMUSCULAR; INTRAVENOUS at 09:07

## 2025-01-14 RX ADMIN — FLUOROURACIL 4460 MG: 50 INJECTION, SOLUTION INTRAVENOUS at 14:17

## 2025-01-14 RX ADMIN — FLUOROURACIL 740 MG: 50 INJECTION, SOLUTION INTRAVENOUS at 14:16

## 2025-01-14 RX ADMIN — OXALIPLATIN 160 MG: 5 INJECTION, SOLUTION, CONCENTRATE INTRAVENOUS at 09:57

## 2025-01-14 RX ADMIN — LEUCOVORIN CALCIUM 740 MG: 350 INJECTION, POWDER, LYOPHILIZED, FOR SOLUTION INTRAMUSCULAR; INTRAVENOUS at 12:07

## 2025-01-14 RX ADMIN — FOSAPREPITANT 100 ML: 150 INJECTION, POWDER, LYOPHILIZED, FOR SOLUTION INTRAVENOUS at 09:24

## 2025-01-14 RX ADMIN — SODIUM CHLORIDE 280 MG: 9 INJECTION, SOLUTION INTRAVENOUS at 12:42

## 2025-01-14 RX ADMIN — ATROPINE SULFATE 0.25 MG: 0.4 INJECTION, SOLUTION INTRAVENOUS at 12:02

## 2025-01-14 NOTE — NURSING NOTE
PT CONNECTED TO CIVI OF 5FU PER RN. + BLD RTN NOTED TO PORT PRIOR TO HOOKUP. BOTH CLAMPS UNCLAMPED. PT INSTRUCTED TO RTN THURS FOR UNHOOK. MESSAGED APPT DESK.     PT TO ALSO RTN FRIDAY FOR STIMUFEND. MESSAGED PRECERT TO SEE IF PT COULD GET APPROVED FOR OBI BEC PT LIVES AN HR AWAY. ALSO MESSAGED DR. IBRAHIM TO SEE IF THIS WOULD BE OK.     PER DR. IBRAHIM OK TO SWITCH PT TO OBI. ELIJAH MESSAGED TO UPDATE PLAN AND THEN PRECERTS WILL SEE IF THEY CAN GET APPROVED.     UPDATE OBI APPROVED BY INS AND PA UPDATED. CALLED PT AND MADE WIFE AWARE OF CHANGE AND SHE WAS VERY THANKFUL. APPT FRI CANCELED.

## 2025-01-14 NOTE — PROGRESS NOTES
OUTPATIENT ONCOLOGY NUTRITION     Patient Name: Brad Bella  YOB: 1955  MRN: [id number]  Assessment Date: 1/14/2025    COMMENTS: Follow up assessment for pt with pancreatic adenocarcinoma (head of pancreas) and new onset diabetes.  Pt is receiving his #2 (of 6) infusion of FOLIFIRINOX.  Labs/Meds from today reviewed.  Glu 102, Hgb 11.6. On short and long acting insulin, has a meter with sensor. Now taking Creon (3 capsules with meals and 2 with snacks)    Met with pt and his wife today in the infusion room.  They report he met with his endocrinologist yesterday and now understands his meter and sensor a little better.  No linger doing finger sticks. Endocrinologist also encouraged him to eat more reinforcing what I told him last visit. He has started his creon. Initially had some nausea with it but now tolerating it better and his loose stools/floating stools are much better. He is drinking Boost Glucose Control supplements from time to time. Reinforced importance of carbs and protein together and reminded him that carbs are his primary energy source and it's OK to eat them.      Also provided him with some Biolyte  and encouraged him to drink one for a few days following his chemo and then a few days prior to next chemo.    Pt and wife have this RD's contact info for any questions. Will follow throughout treatment course and be available as needed. Pt and wife very appreciative of visit.        Reason for Assessment Follow up     Diagnosis/Problem   Pancreatic adenocarcinoma   Treatment Plan Chemotherapy FOLOFIRINOX   Frequency Q 2 weeks for 12 weeks; Poss partial pancreatectomy after chemo   Goal of cancer treatment Curative, Neoadjuvant   Comments:          Encounter Information        Nutrition/Diet History:  Trying to eat low sugar   Oral Nutrition Supplements: Boost GC   Factors/Symptoms Affecting Intake: Weight loss, Other  loose stools improving   Comments:      Medical/Surgical History Past  "Medical History:   Diagnosis Date    Diabetes mellitus     Kidney stone 09/05/2020       Past Surgical History:   Procedure Laterality Date    URETEROSCOPY LASER LITHOTRIPSY WITH STENT INSERTION Left 09/18/2020    Procedure: LFT URETEROSCOPY LASER LITHOTRIPSY WITH STENT INSERTION;  Surgeon: James Torres MD;  Location: [facility];  Service: Urology;  Laterality: Left;    VASECTOMY                 Anthropometrics        Current Height Ht Readings from Last 1 Encounters:   01/14/25 180 cm (70.87\")      Current Weight Wt Readings from Last 1 Encounters:   01/14/25 65 kg (143 lb 4.8 oz)      BMI  20   Usual Body Weight (UBW) 165-170lb   Weight Change/Trend Loss   Weight History Wt Readings from Last 30 Encounters:   01/14/25 0825 65 kg (143 lb 4.8 oz)   01/13/25 1203 64.9 kg (143 lb)   12/31/24 0940 65.3 kg (144 lb)   12/18/24 1501 66.4 kg (146 lb 4.8 oz)   12/13/24 1441 67.9 kg (149 lb 12.8 oz)   12/10/24 0925 69 kg (152 lb 3.2 oz)   12/09/24 1345 69.4 kg (153 lb)   12/03/24 1532 70.2 kg (154 lb 12.8 oz)   11/26/24 1103 72.1 kg (159 lb)   11/25/24 1005 72.2 kg (159 lb 3.2 oz)   09/18/20 0858 76.7 kg (169 lb 3.2 oz)   09/05/20 1005 77.1 kg (170 lb)          Medications           Current medications: FreeStyle Cassie 3 Sensor, Glucagon, Insulin Aspart (w/Niacinamide), Insulin Degludec, Insulin Pen Needle, ONE TOUCH ULTRA 2, OneTouch UltraSoft 2 Lancets, Pancrelipase (Lip-Prot-Amyl), glucose, glucose blood, insulin aspart, lidocaine-prilocaine, and ondansetron                 Tests/Procedures        Tests/Procedures Chemotherapy     Labs       Pertinent Labs    Results from last 7 days   Lab Units 01/14/25  0828   SODIUM mmol/L 142   POTASSIUM mmol/L 3.5   CHLORIDE mmol/L 106   CO2 mmol/L 27.4   BUN mg/dL 9   CREATININE mg/dL 0.60*   CALCIUM mg/dL 9.0   BILIRUBIN mg/dL 0.9   ALK PHOS U/L 151*   ALT (SGPT) U/L 21   AST (SGOT) U/L 19   GLUCOSE mg/dL 102*     Results from last 7 days   Lab Units 01/14/25  0828 " 01/14/25  0827   HEMOGLOBIN g/dL  --  11.6*   HEMATOCRIT %  --  35.7*   WBC 10*3/mm3  --  14.31*   ALBUMIN g/dL 3.7  --      Results from last 7 days   Lab Units 01/14/25  0827   PLATELETS 10*3/mm3 409     COVID19   Date Value Ref Range Status   09/18/2020 Not Detected Not Detected - Ref. Range Final     Lab Results   Component Value Date    HGBA1C 8.1 (H) 11/25/2024          Physical Findings        Physical Appearance alert, loss of muscle mass, loss of subcutaneous fat, oriented, underweight     Edema  no edema   Gastrointestinal nausea    Tubes/Lines/Drains Implantable Port   Oral/Mouth Cavity WNL   Skin Intact       Estimated/Assessed Needs        Energy Requirements    Height for Calculation      Weight for Calculation 65 kg   Method for Estimation  30 kcal/kg   EST Needs (kcal/day) 1950 kcals per day       Protein Requirements    Weight for Calculation 65 kg   EST Protein Needs (g/kg) 1.2 - 1.5 gm/kg   EST Daily Needs (g/day) 78-97 gms per day       Fluid Requirements     Method for Estimation 1 mL/kcal    Estimated Needs (mL/day) 1900 mLs per day         NUTRITION INTERVENTION / PLAN OF CARE      Intervention Goal(s) Maintain nutrition status, Provide information, Meet estimated needs, Disease management/therapy, Tolerate PO , Increase intake, and Appropriate weight gain         RD Intervention/Action Encouraged intake, Follow Tx progress, Recommended/ordered         Recommendations:       PO Diet Consume calorie and protein dense consistent carb diet      Supplements Boost Glucose Control      Snacks       Other    New Prescription Ordered? No, recommend   --      Monitor/Evaluation Follow up as needed   Education Education provided   --    Electronically signed by:  Shania Abbott RD  01/14/25 11:13 EST

## 2025-01-15 ENCOUNTER — TELEPHONE (OUTPATIENT)
Dept: ONCOLOGY | Facility: CLINIC | Age: 70
End: 2025-01-15
Payer: MEDICARE

## 2025-01-15 NOTE — TELEPHONE ENCOUNTER
----- Message from Saritha MARRERO sent at 1/15/2025 10:30 AM EST -----  Regarding: FW: neulasta day 4    ----- Message -----  From: Ann Marie Narvaez RN  Sent: 1/15/2025  10:14 AM EST  To: [facility]  Subject: neulasta day 4                                   He does not want to get neulasta onpro on his unhook day. Please put back on day after unhook for his neulasta

## 2025-01-16 ENCOUNTER — INFUSION (OUTPATIENT)
Dept: ONCOLOGY | Facility: HOSPITAL | Age: 70
End: 2025-01-16
Payer: MEDICARE

## 2025-01-16 DIAGNOSIS — C25.9 PANCREATIC ADENOCARCINOMA: ICD-10-CM

## 2025-01-16 DIAGNOSIS — Z79.899 HIGH RISK MEDICATION USE: ICD-10-CM

## 2025-01-16 DIAGNOSIS — Z45.9 ENCOUNTER FOR MANAGEMENT OF IMPLANTED DEVICE: Primary | ICD-10-CM

## 2025-01-16 PROCEDURE — 96523 IRRIG DRUG DELIVERY DEVICE: CPT

## 2025-01-16 PROCEDURE — 25010000002 HEPARIN LOCK FLUSH PER 10 UNITS: Performed by: NURSE PRACTITIONER

## 2025-01-16 RX ORDER — HEPARIN SODIUM (PORCINE) LOCK FLUSH IV SOLN 100 UNIT/ML 100 UNIT/ML
500 SOLUTION INTRAVENOUS AS NEEDED
Status: DISCONTINUED | OUTPATIENT
Start: 2025-01-16 | End: 2025-01-16 | Stop reason: HOSPADM

## 2025-01-16 RX ORDER — SODIUM CHLORIDE 0.9 % (FLUSH) 0.9 %
10 SYRINGE (ML) INJECTION AS NEEDED
OUTPATIENT
Start: 2025-01-16

## 2025-01-16 RX ORDER — HEPARIN SODIUM (PORCINE) LOCK FLUSH IV SOLN 100 UNIT/ML 100 UNIT/ML
500 SOLUTION INTRAVENOUS AS NEEDED
OUTPATIENT
Start: 2025-01-16

## 2025-01-16 RX ORDER — SODIUM CHLORIDE 0.9 % (FLUSH) 0.9 %
10 SYRINGE (ML) INJECTION AS NEEDED
Status: DISCONTINUED | OUTPATIENT
Start: 2025-01-16 | End: 2025-01-16 | Stop reason: HOSPADM

## 2025-01-16 RX ADMIN — Medication 10 ML: at 12:30

## 2025-01-16 RX ADMIN — Medication 500 UNITS: at 12:30

## 2025-01-16 NOTE — NURSING NOTE
"States that he has done research with the company of [employer] and read that detergent/ soap can impeded the adherence of the adhesive and \" I just dont trust those pumps put on the skin: I have a blood sugar one on my arm and I've had my problems with it\". \"I have no problem returning to get my shot tomorrow\".    He was instructed re: claritin 01-03-25 with first dose of growth factor and states that he started it the the same day.   "

## 2025-01-17 ENCOUNTER — INFUSION (OUTPATIENT)
Dept: ONCOLOGY | Facility: HOSPITAL | Age: 70
End: 2025-01-17
Payer: MEDICARE

## 2025-01-17 DIAGNOSIS — C25.9 PANCREATIC ADENOCARCINOMA: Primary | ICD-10-CM

## 2025-01-17 DIAGNOSIS — Z79.899 HIGH RISK MEDICATION USE: ICD-10-CM

## 2025-01-17 PROCEDURE — 96372 THER/PROPH/DIAG INJ SC/IM: CPT

## 2025-01-17 PROCEDURE — 25010000002 PEGFILGRASTIM-FPGK 6 MG/0.6ML SOLUTION PREFILLED SYRINGE: Performed by: NURSE PRACTITIONER

## 2025-01-17 RX ADMIN — PEGFLILGRASTIM-FPGK 6 MG: 6 INJECTION, SOLUTION SUBCUTANEOUS at 14:00

## 2025-01-24 NOTE — PROGRESS NOTES
"    .     REASON FOR FOLLOWUP :   pancreatic adenocarcinoma (localized.  Goal is cure)    HISTORY OF PRESENT ILLNESS:  The patient is a 69 y.o. year old male  who is here for follow-up with the above-mentioned history.    Here for C3D1 modified FOLFIRINOX.  Seen today with his wife present.  Has occasional \"unsettled stomach \", unsure if this is nausea.  Has not tried his antiemetics.  No vomiting.  Did have a few days of diarrhea following treatment, this resolved and his bowels are now moving regularly with use of Creon.  He did experience cold sensitivity for about 1 week, no signs or symptoms of peripheral neuropathy.  Appetite remains stable, weight stable.  Having some fatigue, still remaining active.  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  Denies fever or chills.  Denies vomiting.  Denies new or worsening pain.    Past Medical History:   Diagnosis Date    Diabetes mellitus     Kidney stone 09/05/2020    Pancreatic adenocarcinoma      Past Surgical History:   Procedure Laterality Date    URETEROSCOPY LASER LITHOTRIPSY WITH STENT INSERTION Left 09/18/2020    Procedure: LFT URETEROSCOPY LASER LITHOTRIPSY WITH STENT INSERTION;  Surgeon: James Torres MD;  Location: [facility];  Service: Urology;  Laterality: Left;    VASECTOMY         MEDICATIONS    Current Outpatient Medications:     Blood Glucose Monitoring Suppl (ONE TOUCH ULTRA 2) w/Device kit, 1 time daily before breakfast, Disp: 1 each, Rfl: 0    Continuous Glucose Sensor (FreeStyle Cassie 3 Sensor) misc, Use 1 Units Every 14 (Fourteen) Days., Disp: 2 each, Rfl: 2    Glucagon (Gvoke HypoPen 2-Pack) 1 MG/0.2ML solution auto-injector, Inject 1 mg under the skin into the appropriate area as directed Daily As Needed (hypoglycemia)., Disp: 0.4 mL, Rfl: 1    glucose blood test strip, 1 times daily before breakfast, Disp: " 100 each, Rfl: 3    insulin aspart (NovoLOG FlexPen) 100 UNIT/ML solution pen-injector sc pen, Inject 2 Units under the skin into the appropriate area as directed 3 (Three) Times a Day With Meals., Disp: 15 mL, Rfl: 1    Insulin Aspart, w/Niacinamide, (Fiasp FlexTouch) 100 UNIT/ML solution pen-injector, Inject 2 Units under the skin into the appropriate area as directed 3 (Three) Times a Day., Disp: 15 mL, Rfl: 2    Insulin Degludec (Tresiba FlexTouch) 200 UNIT/ML solution pen-injector pen injection, Inject 8 Units under the skin into the appropriate area as directed Every Night., Disp: 3.6 mL, Rfl: 3    Insulin Pen Needle (BD Pen Needle Norma 2nd Gen) 32G X 4 MM misc, Use 1 Units 4 (Four) Times a Day., Disp: 100 each, Rfl: 0    Insulin Pen Needle (BD Pen Needle Norma U/F) 32G X 4 MM misc, Use 1 Units 4 (Four) Times a Day., Disp: 100 each, Rfl: 1    lidocaine-prilocaine (EMLA) 2.5-2.5 % cream, Apply 1 Application topically to the appropriate area as directed As Needed for Mild Pain or Injection Site Pain., Disp: 30 g, Rfl: 3    OneTouch UltraSoft 2 Lancets misc, Use 1 each Every Morning Before Breakfast., Disp: 100 each, Rfl: 2    Pancrelipase, Lip-Prot-Amyl, (Creon) 35468-759952 units capsule delayed-release particles capsule, Take 1 capsule by mouth 3 (Three) Times a Day With Meals. And take 2 capsules with snacks., Disp: 450 capsule, Rfl: 1    glucose (B-D GLUCOSE) 5 g chewable tablet, Chew 3 tablets As Needed for Low Blood Sugar. Take 3 tablets for low blood sugar less than 80 (Patient not taking: Reported on 1/28/2025), Disp: 50 tablet, Rfl: 2    ondansetron (ZOFRAN) 8 MG tablet, Take 1 tablet by mouth 3 (Three) Times a Day As Needed for Nausea or Vomiting. (Patient not taking: Reported on 1/28/2025), Disp: 30 tablet, Rfl: 5  No current facility-administered medications for this visit.    Facility-Administered Medications Ordered in Other Visits:     dexAMETHasone (DECADRON) IVPB 12 mg, 12 mg, Intravenous, Once,  "Judi Chance APRN    dextrose (D5W) 5 % infusion, 20 mL/hr, Intravenous, Once, Judi Chance APRN    FOSAPREPITANT 150 MG/100ML NORMAL SALINE (CBC) IVPB 100 mL 100 mL, 150 mg, Intravenous, Once, Judi Chance APRN    palonosetron (ALOXI) injection 0.25 mg, 0.25 mg, Intravenous, Once, Judi Chance APRN    ALLERGIES:   No Known Allergies    SOCIAL HISTORY:       Social History     Socioeconomic History    Marital status:      Spouse name: Debbie   Tobacco Use    Smoking status: Every Day     Types: Pipe    Smokeless tobacco: Never   Vaping Use    Vaping status: Never Used   Substance and Sexual Activity    Alcohol use: Not Currently    Drug use: Never    Sexual activity: Yes     Partners: Female     Birth control/protection: Vasectomy         FAMILY HISTORY:  Family History   Problem Relation Age of Onset    Diabetes Mother     Heart attack Father     Pancreatic cancer Sister 69     REVIEW OF SYSTEMS:  Review of Systems   Constitutional:  Negative for activity change.   HENT:  Negative for nosebleeds and trouble swallowing.    Respiratory:  Negative for shortness of breath and wheezing.    Cardiovascular:  Negative for chest pain and palpitations.   Gastrointestinal:  Negative for constipation, diarrhea and nausea.   Genitourinary:  Negative for dysuria and hematuria.   Musculoskeletal:  Negative for arthralgias and myalgias.   Neurological:  Negative for seizures and syncope.   Hematological:  Negative for adenopathy. Does not bruise/bleed easily.   Psychiatric/Behavioral:  Negative for confusion.               Vitals:    01/28/25 0815   BP: 135/68   Pulse: 82   Resp: 16   Temp: 98.1 °F (36.7 °C)   TempSrc: Oral   SpO2: 95%   Weight: 65.1 kg (143 lb 8 oz)   Height: 180 cm (70.87\")   PainSc: 0-No pain                 1/28/2025     8:17 AM   Current Status   ECOG score 0      PHYSICAL EXAM:      CONSTITUTIONAL:  Vital signs reviewed.  No distress, looks comfortable.  EYES:  Conjunctiva and lids " unremarkable.  PERRLA  EARS,NOSE,MOUTH,THROAT:  Ears and nose appear unremarkable.  Lips, teeth, gums appear unremarkable.  RESPIRATORY:  Normal respiratory effort.  Lungs clear to auscultation bilaterally.  CARDIOVASCULAR:  Normal S1, S2.  No murmurs rubs or gallops.  No significant lower extremity edema.  GASTROINTESTINAL: Abdomen appears unremarkable.  Nontender.  No hepatomegaly.  No splenomegaly.  LYMPHATIC:  No cervical, supraclavicular, axillary lymphadenopathy.  SKIN:  Warm.  No rashes.  PSYCHIATRIC:  Normal judgment and insight.  Normal mood and affect.    RECENT LABS:        WBC   Date Value Ref Range Status   01/28/2025 25.77 (H) 3.40 - 10.80 10*3/mm3 Final   01/14/2025 14.31 (H) 3.40 - 10.80 10*3/mm3 Final   12/31/2024 10.14 3.40 - 10.80 10*3/mm3 Final   12/03/2024 9.67 3.40 - 10.80 10*3/mm3 Final   11/25/2024 7.6 3.4 - 10.8 x10E3/uL Final     Comment:     **Effective December 2, 2024 profile 072350 WBC will be made**    non-orderable as a stand-alone order code.     09/18/2020 17.04 (H) 3.40 - 10.80 10*3/mm3 Final   09/05/2020 13.70 (H) 3.40 - 10.80 10*3/mm3 Final     Hemoglobin   Date Value Ref Range Status   01/28/2025 11.6 (L) 13.0 - 17.7 g/dL Final   01/14/2025 11.6 (L) 13.0 - 17.7 g/dL Final   12/31/2024 12.3 (L) 13.0 - 17.7 g/dL Final   12/03/2024 13.1 13.0 - 17.7 g/dL Final   11/25/2024 14.2 13.0 - 17.7 g/dL Final   09/18/2020 14.1 13.0 - 17.7 g/dL Final   09/05/2020 14.7 13.0 - 17.7 g/dL Final     Platelets   Date Value Ref Range Status   01/28/2025 426 140 - 450 10*3/mm3 Final   01/14/2025 409 140 - 450 10*3/mm3 Final   12/31/2024 444 140 - 450 10*3/mm3 Final   12/03/2024 459 (H) 140 - 450 10*3/mm3 Final   11/25/2024 435 150 - 450 x10E3/uL Final   09/18/2020 517 (H) 140 - 450 10*3/mm3 Final   09/05/2020 448 140 - 450 10*3/mm3 Final       Assessment & Plan   There are no diagnoses linked to this encounter.    Brad Bella   *Pancreatic adenocarcinoma (head of pancreas)   Reviewed PCP note from  11/26/2024: New onset diabetes, elevated LFTs, bilirubin, and alkaline phosphatase.  CT AP with contrast, 11/26/2024, from 9/18/2020: Pancreatic head mass, 2.7 x 2.2 x 2.1 cm.  Abnormal intrahepatic and extrahepatic biliary ductal dilation compatible with biliary obstruction.  No evidence of liver metastasis.  Scattered small mesenteric and periaortic nodes, not significant by size criteria.  Lung bases: New pulmonary nodule, LLL, 8 mm.  Stable LLL 4 mm nodule and stable RML pleural-based 3 mm nodule  Neoadjuvant FOLFIRINOX versus Gemzar Abraxane (without XRT)  SWOG  compare these 2 regimens, 12 weeks preoperative and 12 weeks postoperative treatment and 102 patients with potentially resectable pancreatic cancer.  Gemzar Abraxane had better results  Up-to-date recommends neoadjuvant modified FOLFIRINOX followed by chemo XRT if patient is felt to be able to tolerate  Up-to-date authors consider upfront pancreatectomy followed by adjuvant chemotherapy to be an equally accepted approach as compared to upfront chemo XRT followed by pancreatectomy  12/3/2024 (initial consult): Bilirubin up to 19.8 from 6.8 on 11/25/2024.  Discussed with Dr. Monse Gutierrez who offered admission to the patient but patient declined.  He is going to do an ERCP for the patient tomorrow as an outpatient to hopefully obtain a tissue diagnosis and perform biliary stenting to relieve the hyperbilirubinemia.  He will also perform a CT of the chest (chest imaging has not yet been done), and repeat abdominal imaging, three-phase, to assess for vessel involvement to assess for resectability.  He request we arrange the PET scan at [facility] which we will do.  Patient denies neuropathy.  I discussed FOLFIRINOX with the patient as I expect that we will be the regimen I recommend for him.  I reviewed side effects and schedule.  I told him I would defer to Dr. Gutierrez sequencing of surgery and chemo and defer to Dr. Gutierrez if radiation will be  recommended  I explained to patient if metastatic disease is found, this is not curable.  Fortunately, no clear evidence of metastatic disease on CT of the abdomen and pelvis.  Patient states if at some point this is determined to not be curable, he will likely want chemo, but wants the main focus to be on quality of life  CT chest and abdomen pelvis three-phase, 12/4/ 24 ([facility]): Multiple bilateral lung nodules..  Radiologist stated differential includes fungal and inflammatory nodules and lung metastasis.  Branching tubular opacity NERY with morphology favoring chronic mucoid impaction.  Posterior pleural plaquing lower lobes, radiologist states differential includes prior asbestos and perhaps neurogenic tumors such as neurofibromas, radiologist states unlikely to be metastasis.  3.4 x 3.2 cm pancreatic head mass with involvement of periampullary region medial wall of second portion of duodenum and obstruction of pancreatic and common bile ducts.  No vascular involvement or evidence of metastatic disease elsewhere in the abdomen or pelvis.  Numerous cystic lesions throughout the pancreas suggesting multiple sidebranch IPMN's measuring up to 2.9 cm.  EUS ERCP with stent placement x 3 and a sphincterotomy, Dr. Gutierrez, 12/4/ 24: Adenocarcinoma.  PET 12/10/2024: Pancreatic head mass 3.4 x 2.4 cm, SUV 10.2.  No hypermetabolic retroperitoneal LAD.  Nodes at reno hepatis of max SUV 2.4 (mediastinal blood pool SUV was 1.9.) multiple bilateral noncalcified pleural plaques with max SUV 1.4.  Photopenic serpiginous curvilinear nodular consolidation NERY which radiologist stated may represent old mucous plugging.  The only hypermetabolic lung opacity is left apex, nonspecific, 1 cm nodular density with some groundglass adjacently, max SUV 5.4.  Radiologist stated opacity may be infectious or inflammatory but could not exclude malignancy especially considering moderate emphysema findings in the lungs.  Radiologist  "recommended follow-up noncontrast CT chest 6 to 8 weeks.  Therefore, no clear evidence of metastatic disease.  12/13/2024 office visit: Explained the goal is cure.    Dr. Gutierrez recommends neoadjuvant FOLFIRINOX with CT at [facility] after every 2 months (maximum of 6 months).  Check with him after each CT, with a plan for resection at some point (possibly without any chemoradiation)  Although the goal is cure patient is leaning towards not taking any therapy and instead of enrolling in hospice.  Long discussion with him encouraging him to try therapy knowing he can stop at any time.  He is worried about side effects of chemo and worried about out-of-pocket cost.  I have asked our billing department to discuss with him estimated out-of-pocket cost for upcoming treatments.  Patient subsequently decided to proceed with chemotherapy, underwent education on 12/18/2024.  Port placement 12/30/2024.  12/31/2024: C1D1 modified FOLFIRINOX.   1/14/2025: Cycle 2-day 1 modified FOLFIRINOX, did experience some fatigue but otherwise good tolerance.  C3 Treatment today.     *Hyperbilirubinemia  11/25/2024: Total bilirubin 6.8.  Direct bilirubin 4.8.  12/3/2024: Bilirubin 19.8.  Urgent ERCP with stent  12/10/2024: Bilirubin 7  12/13/2024: Bilirubin 5.2  12/31/2024: Bilirubin 1.8, can start irinotecan.  1/14/2025: bilirubin 0.9.  Cannot use Irinotecan until bilirubin <2 (no guidelines for reduction from  regarding AST/ALT)  Okay to use oxaliplatin regardless of bilirubin/LFTs  Okay to use 5-FU with \"mild to moderate hepatic impairment without concomitant renal impairment.  Severe impairment: Use is not recommended\" per .  Gemzar: If bilirubin >1.6, use initial dose 800 mg/m2 and may escalate as tolerated.  Abraxane: Cannot use if bilirubin is >5.  Cannot use if any elevation of AST and bilirubin >1.5    *Family history of pancreatic cancer (sister).  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  I do not see any association of this mutation with pancreatic cancer, but patient had a sister with pancreatic cancer  12/13/2024: Referred to [facility] genetics clinic    *New onset diabetes, likely due to pancreatic cancer  Following with endocrinology.  On insulin.    *Weight loss  Referred to oncology dietitian on 12/13/2024  Weight today declined further to 144 pounds.  Continue follow-up with oncology dietitian.  Was also evaluated recently by Dr. Gutierrez who recommended starting Creon to help with weight gain.  Will send in Creon 500 units/kg per meal as Dr. Gutierrez was planning.  Prescription sent to pharmacy and message sent to care coordinators to assist with cost.  Weight today stable at 143 pounds.    Plan:   Proceed with cycle 3 mFOLFIRINOX  Continue Creon.  Refill sent to specialty pharmacy.  Continue follow-up with oncology dietitian.  Port placement with Dr. Gutierrez 12/30/2024.    Schedule for MD or NP with modified FOLFIRINOX every 12 weeks x 12 cycles (6 months).  CT every 2 months.  Been referred to genetics clinic.  Following with oncology dietitian.    High risk medication.  Several discussions today with pharmacy and care coordinators in regards to coordinating patient's prescription for Creon.     I spent 52 minutes caring for Brad on this date of service. This time includes time spent by me in the following activities: preparing for the visit, reviewing tests, obtaining and/or reviewing a separately obtained history, performing a medically appropriate examination and/or evaluation, counseling and educating the patient/family/caregiver, ordering medications, tests, or procedures, documenting information in the medical record, and care coordination.

## 2025-01-28 ENCOUNTER — INFUSION (OUTPATIENT)
Dept: ONCOLOGY | Facility: HOSPITAL | Age: 70
End: 2025-01-28
Payer: MEDICARE

## 2025-01-28 ENCOUNTER — OFFICE VISIT (OUTPATIENT)
Dept: ONCOLOGY | Facility: CLINIC | Age: 70
End: 2025-01-28
Payer: MEDICARE

## 2025-01-28 VITALS
TEMPERATURE: 98.1 F | DIASTOLIC BLOOD PRESSURE: 68 MMHG | BODY MASS INDEX: 20.09 KG/M2 | RESPIRATION RATE: 16 BRPM | WEIGHT: 143.5 LBS | HEIGHT: 71 IN | SYSTOLIC BLOOD PRESSURE: 135 MMHG | HEART RATE: 82 BPM | OXYGEN SATURATION: 95 %

## 2025-01-28 DIAGNOSIS — Z79.899 HIGH RISK MEDICATION USE: ICD-10-CM

## 2025-01-28 DIAGNOSIS — C25.9 PANCREATIC ADENOCARCINOMA: ICD-10-CM

## 2025-01-28 DIAGNOSIS — C25.9 PANCREATIC ADENOCARCINOMA: Primary | ICD-10-CM

## 2025-01-28 LAB
ALBUMIN SERPL-MCNC: 3.6 G/DL (ref 3.5–5.2)
ALBUMIN/GLOB SERPL: 1.3 G/DL
ALP SERPL-CCNC: 211 U/L (ref 39–117)
ALT SERPL W P-5'-P-CCNC: 24 U/L (ref 1–41)
ANION GAP SERPL CALCULATED.3IONS-SCNC: 7.4 MMOL/L (ref 5–15)
AST SERPL-CCNC: 17 U/L (ref 1–40)
BILIRUB SERPL-MCNC: 0.5 MG/DL (ref 0–1.2)
BUN SERPL-MCNC: 10 MG/DL (ref 8–23)
BUN/CREAT SERPL: 15.9 (ref 7–25)
CALCIUM SPEC-SCNC: 9.2 MG/DL (ref 8.6–10.5)
CHLORIDE SERPL-SCNC: 107 MMOL/L (ref 98–107)
CO2 SERPL-SCNC: 27.6 MMOL/L (ref 22–29)
CREAT SERPL-MCNC: 0.63 MG/DL (ref 0.76–1.27)
DEPRECATED RDW RBC AUTO: 51.1 FL (ref 37–54)
EGFRCR SERPLBLD CKD-EPI 2021: 103 ML/MIN/1.73
ERYTHROCYTE [DISTWIDTH] IN BLOOD BY AUTOMATED COUNT: 15.5 % (ref 12.3–15.4)
GLOBULIN UR ELPH-MCNC: 2.8 GM/DL
GLUCOSE SERPL-MCNC: 174 MG/DL (ref 65–99)
HCT VFR BLD AUTO: 35.2 % (ref 37.5–51)
HGB BLD-MCNC: 11.6 G/DL (ref 13–17.7)
LYMPHOCYTES # BLD MANUAL: 3.35 10*3/MM3 (ref 0.7–3.1)
LYMPHOCYTES NFR BLD MANUAL: 9 % (ref 5–12)
MCH RBC QN AUTO: 29.7 PG (ref 26.6–33)
MCHC RBC AUTO-ENTMCNC: 33 G/DL (ref 31.5–35.7)
MCV RBC AUTO: 90.3 FL (ref 79–97)
MONOCYTES # BLD: 2.32 10*3/MM3 (ref 0.1–0.9)
NEUTROPHILS # BLD AUTO: 20.1 10*3/MM3 (ref 1.7–7)
NEUTROPHILS NFR BLD MANUAL: 78 % (ref 42.7–76)
PLAT MORPH BLD: NORMAL
PLATELET # BLD AUTO: 426 10*3/MM3 (ref 140–450)
PMV BLD AUTO: 9.5 FL (ref 6–12)
POTASSIUM SERPL-SCNC: 3.6 MMOL/L (ref 3.5–5.2)
PROT SERPL-MCNC: 6.4 G/DL (ref 6–8.5)
RBC # BLD AUTO: 3.9 10*6/MM3 (ref 4.14–5.8)
RBC MORPH BLD: NORMAL
SODIUM SERPL-SCNC: 142 MMOL/L (ref 136–145)
VARIANT LYMPHS NFR BLD MANUAL: 13 % (ref 19.6–45.3)
WBC MORPH BLD: NORMAL
WBC NRBC COR # BLD AUTO: 25.77 10*3/MM3 (ref 3.4–10.8)

## 2025-01-28 PROCEDURE — 96367 TX/PROPH/DG ADDL SEQ IV INF: CPT

## 2025-01-28 PROCEDURE — 25010000002 OXALIPLATIN PER 0.5 MG: Performed by: NURSE PRACTITIONER

## 2025-01-28 PROCEDURE — 25010000002 IRINOTECAN PER 20 MG: Performed by: NURSE PRACTITIONER

## 2025-01-28 PROCEDURE — 80053 COMPREHEN METABOLIC PANEL: CPT | Performed by: INTERNAL MEDICINE

## 2025-01-28 PROCEDURE — 25010000002 PALONOSETRON PER 25 MCG: Performed by: NURSE PRACTITIONER

## 2025-01-28 PROCEDURE — 25010000002 FLUOROURACIL PER 500 MG: Performed by: NURSE PRACTITIONER

## 2025-01-28 PROCEDURE — 96375 TX/PRO/DX INJ NEW DRUG ADDON: CPT

## 2025-01-28 PROCEDURE — 25010000003 DEXTROSE 5 % SOLUTION 250 ML FLEX CONT: Performed by: NURSE PRACTITIONER

## 2025-01-28 PROCEDURE — 25010000002 DEXAMETHASONE SODIUM PHOSPHATE 100 MG/10ML SOLUTION: Performed by: NURSE PRACTITIONER

## 2025-01-28 PROCEDURE — G0498 CHEMO EXTEND IV INFUS W/PUMP: HCPCS

## 2025-01-28 PROCEDURE — 85007 BL SMEAR W/DIFF WBC COUNT: CPT | Performed by: INTERNAL MEDICINE

## 2025-01-28 PROCEDURE — 25010000003 DEXTROSE 5 % SOLUTION: Performed by: NURSE PRACTITIONER

## 2025-01-28 PROCEDURE — 25010000002 ATROPINE SULFATE 0.4 MG/ML SOLUTION: Performed by: NURSE PRACTITIONER

## 2025-01-28 PROCEDURE — 96413 CHEMO IV INFUSION 1 HR: CPT

## 2025-01-28 PROCEDURE — 25010000002 LEUCOVORIN CALCIUM PER 50 MG: Performed by: NURSE PRACTITIONER

## 2025-01-28 PROCEDURE — 96417 CHEMO IV INFUS EACH ADDL SEQ: CPT

## 2025-01-28 PROCEDURE — 85025 COMPLETE CBC W/AUTO DIFF WBC: CPT | Performed by: INTERNAL MEDICINE

## 2025-01-28 PROCEDURE — 96366 THER/PROPH/DIAG IV INF ADDON: CPT

## 2025-01-28 PROCEDURE — 96411 CHEMO IV PUSH ADDL DRUG: CPT

## 2025-01-28 PROCEDURE — 96415 CHEMO IV INFUSION ADDL HR: CPT

## 2025-01-28 PROCEDURE — 25810000003 SODIUM CHLORIDE 0.9 % SOLUTION 250 ML FLEX CONT: Performed by: NURSE PRACTITIONER

## 2025-01-28 PROCEDURE — 25810000003 SODIUM CHLORIDE 0.9 % SOLUTION 500 ML FLEX CONT: Performed by: NURSE PRACTITIONER

## 2025-01-28 PROCEDURE — 25010000002 FOSAPREPITANT PER 1 MG: Performed by: NURSE PRACTITIONER

## 2025-01-28 PROCEDURE — 96368 THER/DIAG CONCURRENT INF: CPT

## 2025-01-28 RX ORDER — DEXTROSE MONOHYDRATE 50 MG/ML
20 INJECTION, SOLUTION INTRAVENOUS ONCE
Status: COMPLETED | OUTPATIENT
Start: 2025-01-28 | End: 2025-01-28

## 2025-01-28 RX ORDER — DEXTROSE MONOHYDRATE 50 MG/ML
20 INJECTION, SOLUTION INTRAVENOUS ONCE
Status: CANCELLED | OUTPATIENT
Start: 2025-01-28

## 2025-01-28 RX ORDER — DIPHENHYDRAMINE HYDROCHLORIDE 50 MG/ML
50 INJECTION INTRAMUSCULAR; INTRAVENOUS AS NEEDED
Status: CANCELLED | OUTPATIENT
Start: 2025-01-28

## 2025-01-28 RX ORDER — FLUOROURACIL 50 MG/ML
400 INJECTION, SOLUTION INTRAVENOUS ONCE
Status: COMPLETED | OUTPATIENT
Start: 2025-01-28 | End: 2025-01-28

## 2025-01-28 RX ORDER — FAMOTIDINE 10 MG/ML
20 INJECTION, SOLUTION INTRAVENOUS AS NEEDED
Status: CANCELLED | OUTPATIENT
Start: 2025-01-28

## 2025-01-28 RX ORDER — HYDROCORTISONE SODIUM SUCCINATE 100 MG/2ML
100 INJECTION INTRAMUSCULAR; INTRAVENOUS AS NEEDED
Status: CANCELLED | OUTPATIENT
Start: 2025-01-28

## 2025-01-28 RX ORDER — ATROPINE SULFATE 1 MG/ML
0.25 INJECTION, SOLUTION INTRAMUSCULAR; INTRAVENOUS; SUBCUTANEOUS
Status: CANCELLED | OUTPATIENT
Start: 2025-01-28

## 2025-01-28 RX ORDER — PALONOSETRON 0.05 MG/ML
0.25 INJECTION, SOLUTION INTRAVENOUS ONCE
Status: COMPLETED | OUTPATIENT
Start: 2025-01-28 | End: 2025-01-28

## 2025-01-28 RX ORDER — ATROPINE SULFATE 0.4 MG/ML
0.25 INJECTION, SOLUTION INTRAMUSCULAR; INTRAVENOUS; SUBCUTANEOUS
Status: DISCONTINUED | OUTPATIENT
Start: 2025-01-28 | End: 2025-01-28 | Stop reason: HOSPADM

## 2025-01-28 RX ORDER — FLUOROURACIL 50 MG/ML
400 INJECTION, SOLUTION INTRAVENOUS ONCE
Status: CANCELLED | OUTPATIENT
Start: 2025-01-28

## 2025-01-28 RX ORDER — PALONOSETRON 0.05 MG/ML
0.25 INJECTION, SOLUTION INTRAVENOUS ONCE
Status: CANCELLED | OUTPATIENT
Start: 2025-01-28

## 2025-01-28 RX ADMIN — IRINOTECAN HYDROCHLORIDE 280 MG: 20 INJECTION, SOLUTION INTRAVENOUS at 12:43

## 2025-01-28 RX ADMIN — OXALIPLATIN 160 MG: 5 INJECTION, SOLUTION INTRAVENOUS at 10:10

## 2025-01-28 RX ADMIN — PALONOSETRON HYDROCHLORIDE 0.25 MG: 0.25 INJECTION INTRAVENOUS at 09:35

## 2025-01-28 RX ADMIN — DEXTROSE MONOHYDRATE 20 ML/HR: 50 INJECTION, SOLUTION INTRAVENOUS at 09:58

## 2025-01-28 RX ADMIN — FLUOROURACIL 740 MG: 50 INJECTION, SOLUTION INTRAVENOUS at 14:21

## 2025-01-28 RX ADMIN — LEUCOVORIN CALCIUM 740 MG: 350 INJECTION, POWDER, LYOPHILIZED, FOR SUSPENSION INTRAMUSCULAR; INTRAVENOUS at 12:11

## 2025-01-28 RX ADMIN — FLUOROURACIL 4460 MG: 50 INJECTION, SOLUTION INTRAVENOUS at 14:25

## 2025-01-28 RX ADMIN — DEXAMETHASONE SODIUM PHOSPHATE 12 MG: 10 INJECTION, SOLUTION INTRAMUSCULAR; INTRAVENOUS at 09:39

## 2025-01-28 RX ADMIN — ATROPINE SULFATE 0.25 MG: 0.4 INJECTION, SOLUTION INTRAVENOUS at 12:41

## 2025-01-28 RX ADMIN — FOSAPREPITANT 100 ML: 150 INJECTION, POWDER, LYOPHILIZED, FOR SOLUTION INTRAVENOUS at 09:03

## 2025-01-29 ENCOUNTER — SPECIALTY PHARMACY (OUTPATIENT)
Dept: PHARMACY | Facility: HOSPITAL | Age: 70
End: 2025-01-29
Payer: MEDICARE

## 2025-01-29 NOTE — PROGRESS NOTES
Late entry for 1/28/2025    I was asked to speak with pt and his wife about the Creon by Judi GILLESPIE NP. Pt gave an approval letter to her during their visit and pt and his wife had questions.    I went and spoke with Brad and his wife. They were asking about timing of delivery from [facility]. I explained that the medication can take up to 10 days to rec from [facility]. They were wanting to make sure that they rec the correct amount of tablets to last him a month. They explained his last fill through [facility] pharmacy was only 200 tablets. I let them know that a new rx was sent to [facility] by Judi GILLESPIE NP. Mrs. Bella explained that he only has a 7 day supply currently. I let them know that I will follow up with Dru to make sure they have the updated rx and will process ASAP.    Amalia Carr, Pharmacy Technician  01/29/25  12:58 EST

## 2025-01-30 ENCOUNTER — INFUSION (OUTPATIENT)
Dept: ONCOLOGY | Facility: HOSPITAL | Age: 70
End: 2025-01-30
Payer: MEDICARE

## 2025-01-30 DIAGNOSIS — Z45.9 ENCOUNTER FOR MANAGEMENT OF IMPLANTED DEVICE: ICD-10-CM

## 2025-01-30 DIAGNOSIS — C25.9 PANCREATIC ADENOCARCINOMA: Primary | ICD-10-CM

## 2025-01-30 DIAGNOSIS — Z79.899 HIGH RISK MEDICATION USE: ICD-10-CM

## 2025-01-30 PROCEDURE — 25010000002 HEPARIN LOCK FLUSH PER 10 UNITS: Performed by: NURSE PRACTITIONER

## 2025-01-30 RX ORDER — HEPARIN SODIUM (PORCINE) LOCK FLUSH IV SOLN 100 UNIT/ML 100 UNIT/ML
500 SOLUTION INTRAVENOUS AS NEEDED
Status: DISCONTINUED | OUTPATIENT
Start: 2025-01-30 | End: 2025-01-30 | Stop reason: HOSPADM

## 2025-01-30 RX ORDER — SODIUM CHLORIDE 0.9 % (FLUSH) 0.9 %
10 SYRINGE (ML) INJECTION AS NEEDED
Status: DISCONTINUED | OUTPATIENT
Start: 2025-01-30 | End: 2025-01-30 | Stop reason: HOSPADM

## 2025-01-30 RX ORDER — SODIUM CHLORIDE 0.9 % (FLUSH) 0.9 %
10 SYRINGE (ML) INJECTION AS NEEDED
OUTPATIENT
Start: 2025-01-30

## 2025-01-30 RX ORDER — HEPARIN SODIUM (PORCINE) LOCK FLUSH IV SOLN 100 UNIT/ML 100 UNIT/ML
500 SOLUTION INTRAVENOUS AS NEEDED
OUTPATIENT
Start: 2025-01-30

## 2025-01-30 RX ADMIN — Medication 10 ML: at 12:34

## 2025-01-30 RX ADMIN — Medication 500 UNITS: at 12:34

## 2025-01-31 ENCOUNTER — INFUSION (OUTPATIENT)
Dept: ONCOLOGY | Facility: HOSPITAL | Age: 70
End: 2025-01-31
Payer: MEDICARE

## 2025-01-31 DIAGNOSIS — Z79.899 HIGH RISK MEDICATION USE: ICD-10-CM

## 2025-01-31 DIAGNOSIS — C25.9 PANCREATIC ADENOCARCINOMA: Primary | ICD-10-CM

## 2025-01-31 PROCEDURE — 96372 THER/PROPH/DIAG INJ SC/IM: CPT

## 2025-01-31 PROCEDURE — 25010000002 PEGFILGRASTIM-FPGK 6 MG/0.6ML SOLUTION PREFILLED SYRINGE: Performed by: INTERNAL MEDICINE

## 2025-01-31 RX ADMIN — PEGFLILGRASTIM-FPGK 6 MG: 6 INJECTION, SOLUTION SUBCUTANEOUS at 12:33

## 2025-01-31 NOTE — NURSING NOTE
Injection  Stimufend Injection performed in R arm by Irvin Moise RN. Patient tolerated the procedure well without complications.  01/31/25   Irvin Moise RN

## 2025-02-10 ENCOUNTER — TELEPHONE (OUTPATIENT)
Dept: ONCOLOGY | Facility: CLINIC | Age: 70
End: 2025-02-10
Payer: MEDICARE

## 2025-02-10 DIAGNOSIS — C25.9 PANCREATIC ADENOCARCINOMA: ICD-10-CM

## 2025-02-10 NOTE — TELEPHONE ENCOUNTER
"----- Message -----  From: Reed Castillo II, MD  Sent: 2/10/2025   6:44 AM EST  To: Hernan Knight Rep; Oncology Nurses    When he decided he wanted treatment, I requested all 12 cycles of FOLFIRINOX be scheduled with MD or NP with each cycle of chemo and CT after cycle 4 after cycle 8 and after cycle 12      I actually requested this 2 separate times.  It looks like he was only scheduled through cycle 5.  Please go ahead and make all these appointments.    He should see me after each CT and it should be a 2 unit with me after each CT.   It looks like I am working in the hospital on the day of cycle 9, April 8.  I can still see him that day.  I could see him by video that day and he could get treated at [place] as he has been doing.    The visit with me after cycle 12 should be on May 30 for a 2 unit.  That is a Thursday.  Please arrange the CT that Tuesday, May 27    The CT scans are chest abdomen and pelvis with and without contrast [triple phase].  Please make sure they are ordered as triple phase    Thank you       made aware of msg. Pt is scheduled to have a scan on 2/18/25 after C4. Confirming CT triple phase order and appts. \"Triple phase is what the surgeon needs to assess for blood vessel involvement to assess for resectability\" ct angiogram of the abdomen pelvis with and without contrast: 1. scan without contrast 2. arterial phase, 3. Venous phase, c/o Ramon Abbott (CT dept). Dr Castillo updated. Orders placed. Coordinating with .  "

## 2025-02-10 NOTE — PROGRESS NOTES
.     REASON FOR FOLLOWUP :   pancreatic adenocarcinoma (localized.  Goal is cure)    HISTORY OF PRESENT ILLNESS:  The patient is a 69 y.o. year old male  who is here for follow-up with the above-mentioned history.    He is here today for cycle 4 of chemo.  Overall is doing well.  Having some more fatigue, especially the first week after treatment.  Further details below    He would like something to take until the Aloxi has worn off.  Adding Compazine.    Past Medical History:   Diagnosis Date    Diabetes mellitus     Kidney stone 09/05/2020    Pancreatic adenocarcinoma      Past Surgical History:   Procedure Laterality Date    URETEROSCOPY LASER LITHOTRIPSY WITH STENT INSERTION Left 09/18/2020    Procedure: LFT URETEROSCOPY LASER LITHOTRIPSY WITH STENT INSERTION;  Surgeon: James Torres MD;  Location: [facility];  Service: Urology;  Laterality: Left;    VASECTOMY         MEDICATIONS    Current Outpatient Medications:     Blood Glucose Monitoring Suppl (ONE TOUCH ULTRA 2) w/Device kit, 1 time daily before breakfast, Disp: 1 each, Rfl: 0    Continuous Glucose Sensor (FreeStyle Cassie 3 Sensor) misc, Use 1 Units Every 14 (Fourteen) Days., Disp: 2 each, Rfl: 2    Glucagon (Gvoke HypoPen 2-Pack) 1 MG/0.2ML solution auto-injector, Inject 1 mg under the skin into the appropriate area as directed Daily As Needed (hypoglycemia)., Disp: 0.4 mL, Rfl: 1    glucose blood test strip, 1 times daily before breakfast, Disp: 100 each, Rfl: 3    insulin aspart (NovoLOG FlexPen) 100 UNIT/ML solution pen-injector sc pen, Inject 2 Units under the skin into the appropriate area as directed 3 (Three) Times a Day With Meals., Disp: 15 mL, Rfl: 1    Insulin Aspart, w/Niacinamide, (Fiasp FlexTouch) 100 UNIT/ML solution pen-injector, Inject 2 Units under the skin into the appropriate area as directed 3 (Three) Times a Day., Disp: 15 mL, Rfl: 2    Insulin Degludec (Tresiba FlexTouch) 200 UNIT/ML solution pen-injector pen  injection, Inject 8 Units under the skin into the appropriate area as directed Every Night., Disp: 3.6 mL, Rfl: 3    Insulin Pen Needle (BD Pen Needle Norma 2nd Gen) 32G X 4 MM misc, Use 1 Units 4 (Four) Times a Day., Disp: 100 each, Rfl: 0    Insulin Pen Needle (BD Pen Needle Norma U/F) 32G X 4 MM misc, Use 1 Units 4 (Four) Times a Day., Disp: 100 each, Rfl: 1    lidocaine-prilocaine (EMLA) 2.5-2.5 % cream, Apply 1 Application topically to the appropriate area as directed As Needed for Mild Pain or Injection Site Pain., Disp: 30 g, Rfl: 3    ondansetron (ZOFRAN) 8 MG tablet, Take 1 tablet by mouth 3 (Three) Times a Day As Needed for Nausea or Vomiting., Disp: 30 tablet, Rfl: 5    OneTouch UltraSoft 2 Lancets misc, Use 1 each Every Morning Before Breakfast., Disp: 100 each, Rfl: 2    Pancrelipase, Lip-Prot-Amyl, (Creon) 46310-800210 units capsule delayed-release particles capsule, Take 2 capsules by mouth 3 (Three) Times a Day With Meals. And take 2 capsules with snacks., Disp: 500 capsule, Rfl: 1    glucose (B-D GLUCOSE) 5 g chewable tablet, Chew 3 tablets As Needed for Low Blood Sugar. Take 3 tablets for low blood sugar less than 80 (Patient not taking: Reported on 12/10/2024), Disp: 50 tablet, Rfl: 2  No current facility-administered medications for this visit.    Facility-Administered Medications Ordered in Other Visits:     atropine sulfate injection 0.25 mg, 0.25 mg, Intravenous, Q15 Min PRN, Reed Castillo II, MD    fluorouracil (ADRUCIL) 4,460 mg in sodium chloride 0.9 % 240 mL chemo infusion - FOR HOME USE, 2,400 mg/m2 (Treatment Plan Recorded), Intravenous, Once, Reed Castillo II, MD    fluorouracil (ADRUCIL) chemo injection 740 mg, 400 mg/m2 (Treatment Plan Recorded), Intravenous, Once, Reed Castillo II, MD    heparin injection 500 Units, 500 Units, Intravenous, PRN, Judi Chance APRN    irinotecan (CAMPTOSAR) 280 mg in sodium chloride 0.9 % 514 mL chemo IVPB, 150 mg/m2 (Treatment Plan Recorded),  "Intravenous, Once, Reed Castillo II, MD    leucovorin 740 mg in sodium chloride 0.9 % 287 mL IVPB, 400 mg/m2 (Treatment Plan Recorded), Intravenous, Once, Reed Castillo II, MD    OXALIplatin (ELOXATIN) 160 mg in dextrose (D5W) 5 % 282 mL chemo IVPB, 85 mg/m2 (Treatment Plan Recorded), Intravenous, Once, Reed Castillo II, MD, Last Rate: 150 mL/hr at 02/11/25 0939, 160 mg at 02/11/25 0939    sodium chloride 0.9 % flush 10 mL, 10 mL, Intravenous, PRN, Robson, Judi, EVA    ALLERGIES:   No Known Allergies    SOCIAL HISTORY:       Social History     Socioeconomic History    Marital status:      Spouse name: Debbie   Tobacco Use    Smoking status: Every Day     Types: Pipe    Smokeless tobacco: Never   Vaping Use    Vaping status: Never Used   Substance and Sexual Activity    Alcohol use: Not Currently    Drug use: Never    Sexual activity: Yes     Partners: Female     Birth control/protection: Vasectomy         FAMILY HISTORY:  Family History   Problem Relation Age of Onset    Diabetes Mother     Heart attack Father     Pancreatic cancer Sister 69       REVIEW OF SYSTEMS:  Review of Systems   Constitutional:  Negative for activity change.   HENT:  Negative for nosebleeds and trouble swallowing.    Respiratory:  Negative for shortness of breath and wheezing.    Cardiovascular:  Negative for chest pain and palpitations.   Gastrointestinal:  Positive for nausea. Negative for constipation and diarrhea.   Genitourinary:  Negative for dysuria and hematuria.   Musculoskeletal:  Negative for arthralgias and myalgias.   Neurological:  Negative for seizures and syncope.   Hematological:  Negative for adenopathy. Does not bruise/bleed easily.   Psychiatric/Behavioral:  Negative for confusion.               Vitals:    02/11/25 0747   BP: 132/72   Pulse: 79   Resp: 16   Temp: 98 °F (36.7 °C)   TempSrc: Oral   SpO2: 96%   Weight: 64.9 kg (143 lb 1.6 oz)   Height: 180 cm (70.87\")   PainSc: 0-No pain             2/11/2025    "  7:48 AM   Current Status   ECOG score 0      PHYSICAL EXAM:        CONSTITUTIONAL:  Vital signs reviewed.  No distress, looks comfortable.  EYES:  Conjunctiva and lids unremarkable.  PERRLA  EARS,NOSE,MOUTH,THROAT:  Ears and nose appear unremarkable.  Lips, teeth, gums appear unremarkable.  RESPIRATORY:  Normal respiratory effort.  Lungs clear to auscultation bilaterally.  CARDIOVASCULAR:  Normal S1, S2.  No murmurs rubs or gallops.  No significant lower extremity edema.  GASTROINTESTINAL: Abdomen appears unremarkable.  Nontender.  No hepatomegaly.  No splenomegaly.  LYMPHATIC:  No cervical, supraclavicular, axillary lymphadenopathy.  SKIN:  Warm.  No rashes.  PSYCHIATRIC:  Normal judgment and insight.  Normal mood and affect.        RECENT LABS:        WBC   Date Value Ref Range Status   02/11/2025 22.70 (H) 3.40 - 10.80 10*3/mm3 Final   01/28/2025 25.77 (H) 3.40 - 10.80 10*3/mm3 Final   01/14/2025 14.31 (H) 3.40 - 10.80 10*3/mm3 Final   12/31/2024 10.14 3.40 - 10.80 10*3/mm3 Final   12/03/2024 9.67 3.40 - 10.80 10*3/mm3 Final   11/25/2024 7.6 3.4 - 10.8 x10E3/uL Final     Comment:     **Effective December 2, 2024 profile 405267 WBC will be made**    non-orderable as a stand-alone order code.     09/18/2020 17.04 (H) 3.40 - 10.80 10*3/mm3 Final   09/05/2020 13.70 (H) 3.40 - 10.80 10*3/mm3 Final     Hemoglobin   Date Value Ref Range Status   02/11/2025 11.4 (L) 13.0 - 17.7 g/dL Final   01/28/2025 11.6 (L) 13.0 - 17.7 g/dL Final   01/14/2025 11.6 (L) 13.0 - 17.7 g/dL Final   12/31/2024 12.3 (L) 13.0 - 17.7 g/dL Final   12/03/2024 13.1 13.0 - 17.7 g/dL Final   11/25/2024 14.2 13.0 - 17.7 g/dL Final   09/18/2020 14.1 13.0 - 17.7 g/dL Final   09/05/2020 14.7 13.0 - 17.7 g/dL Final     Platelets   Date Value Ref Range Status   02/11/2025 412 140 - 450 10*3/mm3 Final   01/28/2025 426 140 - 450 10*3/mm3 Final   01/14/2025 409 140 - 450 10*3/mm3 Final   12/31/2024 444 140 - 450 10*3/mm3 Final   12/03/2024 459 (H) 140 - 450  10*3/mm3 Final   11/25/2024 435 150 - 450 x10E3/uL Final   09/18/2020 517 (H) 140 - 450 10*3/mm3 Final   09/05/2020 448 140 - 450 10*3/mm3 Final       Assessment & Plan   There are no diagnoses linked to this encounter.        Brad Bella   *Pancreatic adenocarcinoma (head of pancreas)   Reviewed PCP note from 11/26/2024: New onset diabetes, elevated LFTs, bilirubin, and alkaline phosphatase.  CT AP with contrast, 11/26/2024, from 9/18/2020: Pancreatic head mass, 2.7 x 2.2 x 2.1 cm.  Abnormal intrahepatic and extrahepatic biliary ductal dilation compatible with biliary obstruction.  No evidence of liver metastasis.  Scattered small mesenteric and periaortic nodes, not significant by size criteria.  Lung bases: New pulmonary nodule, LLL, 8 mm.  Stable LLL 4 mm nodule and stable RML pleural-based 3 mm nodule  Neoadjuvant FOLFIRINOX versus Gemzar Abraxane (without XRT)  SWOG  compare these 2 regimens, 12 weeks preoperative and 12 weeks postoperative treatment and 102 patients with potentially resectable pancreatic cancer.  Gemzar Abraxane had better results  Up-to-date recommends neoadjuvant modified FOLFIRINOX followed by chemo XRT if patient is felt to be able to tolerate  Up-to-date authors consider upfront pancreatectomy followed by adjuvant chemotherapy to be an equally accepted approach as compared to upfront chemo XRT followed by pancreatectomy  12/3/2024 (initial consult): Bilirubin up to 19.8 from 6.8 on 11/25/2024.  Discussed with Dr. Monse Gutierrez who offered admission to the patient but patient declined.  He is going to do an ERCP for the patient tomorrow as an outpatient to hopefully obtain a tissue diagnosis and perform biliary stenting to relieve the hyperbilirubinemia.  He will also perform a CT of the chest (chest imaging has not yet been done), and repeat abdominal imaging, three-phase, to assess for vessel involvement to assess for resectability.  He request we arrange the PET scan at [facility]  which we will do.  Patient denies neuropathy.  I discussed FOLFIRINOX with the patient as I expect that we will be the regimen I recommend for him.  I reviewed side effects and schedule.  I told him I would defer to Dr. Gutierrez sequencing of surgery and chemo and defer to Dr. Gutierrez if radiation will be recommended  I explained to patient if metastatic disease is found, this is not curable.  Fortunately, no clear evidence of metastatic disease on CT of the abdomen and pelvis.  Patient states if at some point this is determined to not be curable, he will likely want chemo, but wants the main focus to be on quality of life  CT chest and abdomen pelvis three-phase, 12/4/ 24 ([facility]): Multiple bilateral lung nodules..  Radiologist stated differential includes fungal and inflammatory nodules and lung metastasis.  Branching tubular opacity NERY with morphology favoring chronic mucoid impaction.  Posterior pleural plaquing lower lobes, radiologist states differential includes prior asbestos and perhaps neurogenic tumors such as neurofibromas, radiologist states unlikely to be metastasis.  3.4 x 3.2 cm pancreatic head mass with involvement of periampullary region medial wall of second portion of duodenum and obstruction of pancreatic and common bile ducts.  No vascular involvement or evidence of metastatic disease elsewhere in the abdomen or pelvis.  Numerous cystic lesions throughout the pancreas suggesting multiple sidebranch IPMN's measuring up to 2.9 cm.  EUS ERCP with stent placement x 3 and a sphincterotomy, Dr. Gutierrez, 12/4/ 24: Adenocarcinoma.  PET 12/10/2024: Pancreatic head mass 3.4 x 2.4 cm, SUV 10.2.  No hypermetabolic retroperitoneal LAD.  Nodes at reno hepatis of max SUV 2.4 (mediastinal blood pool SUV was 1.9.) multiple bilateral noncalcified pleural plaques with max SUV 1.4.  Photopenic serpiginous curvilinear nodular consolidation NERY which radiologist stated may represent old mucous plugging.  The  "only hypermetabolic lung opacity is left apex, nonspecific, 1 cm nodular density with some groundglass adjacently, max SUV 5.4.  Radiologist stated opacity may be infectious or inflammatory but could not exclude malignancy especially considering moderate emphysema findings in the lungs.  Radiologist recommended follow-up noncontrast CT chest 6 to 8 weeks.  Therefore, no clear evidence of metastatic disease.  12/13/2024 office visit: Explained the goal is cure.    Dr. Gutierrez recommends neoadjuvant FOLFIRINOX with CT at [facility] after every 2 months (maximum of 6 months).  Check with him after each CT, with a plan for resection at some point (possibly without any chemoradiation)  Although the goal is cure patient is leaning towards not taking any therapy and instead of enrolling in hospice.  Long discussion with him encouraging him to try therapy knowing he can stop at any time.  He is worried about side effects of chemo and worried about out-of-pocket cost.  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  No numbness.  No interference with function.    *Hyperbilirubinemia  11/25/2024: Total bilirubin 6.8.  Direct bilirubin 4.8.  12/3/2024: Bilirubin 19.8.  Urgent ERCP with stent  12/10/2024: Bilirubin 7  12/13/2024: Bilirubin 5.2  Cannot use Irinotecan until bilirubin <2 (no guidelines for reduction from  regarding AST/ALT)  Okay to use oxaliplatin regardless of bilirubin/LFTs  Okay to use 5-FU with \"mild to moderate hepatic impairment without concomitant renal impairment.  Severe impairment: Use is not " "recommended\" per .  Gemzar: If bilirubin >1.6, use initial dose 800 mg/m2 and may escalate as tolerated.  Abraxane: Cannot use if bilirubin is >5.  Cannot use if any elevation of AST and bilirubin >1.5  Bilirubin 0.4    *Anemia  Hb 11.4    *Family history of pancreatic cancer (sister).  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  I do not see any association of this mutation with pancreatic cancer, but patient had a sister with pancreatic cancer  12/13/2024: Referred to [facility] genetics clinic    *New onset diabetes, likely due to pancreatic cancer  Was due for outpatient endocrinology consult 12/4/ 24, range through PCP.  This will need to be rescheduled due to the urgent need for biliary stenting    *Weight loss  Referred to oncology dietitian on 12/13/2024  Started Creon per Dr. Gutierrez recommendation (ordered through us)  Dietitian is following  2/11/2025: Weight has been stable since beginning chemo.    *Nausea  Add Compazine    Plan  MD or NP and chemo has been arranged through cycle 12.  CT after cycle 4, after cycle 9, after cycle 12  Requested a visit with Dr. Gutierrez after the CT after cycle 12 and an appointment with an NP after that ([place]  The CT scans are chest abdomen and pelvis with and without contrast [triple phase].  Please make sure they are ordered as triple phase (he declines oral contrast)    Referral to the genetics clinic.  2/11/2025: I reminded him of the importance of seeing genetics.  He is not sure if he is going to do this.  He will think about this.    2 UNITS EVERY VISIT. NEVER OVERBOOK       85 minutes.  Total time.  Same day      "

## 2025-02-11 ENCOUNTER — CLINICAL SUPPORT (OUTPATIENT)
Dept: OTHER | Facility: HOSPITAL | Age: 70
End: 2025-02-11
Payer: MEDICARE

## 2025-02-11 ENCOUNTER — OFFICE VISIT (OUTPATIENT)
Dept: ONCOLOGY | Facility: CLINIC | Age: 70
End: 2025-02-11
Payer: MEDICARE

## 2025-02-11 ENCOUNTER — INFUSION (OUTPATIENT)
Dept: ONCOLOGY | Facility: HOSPITAL | Age: 70
End: 2025-02-11
Payer: MEDICARE

## 2025-02-11 VITALS
HEIGHT: 71 IN | OXYGEN SATURATION: 96 % | HEART RATE: 79 BPM | DIASTOLIC BLOOD PRESSURE: 72 MMHG | BODY MASS INDEX: 20.03 KG/M2 | WEIGHT: 143.1 LBS | SYSTOLIC BLOOD PRESSURE: 132 MMHG | TEMPERATURE: 98 F | RESPIRATION RATE: 16 BRPM

## 2025-02-11 DIAGNOSIS — C25.9 PANCREATIC ADENOCARCINOMA: ICD-10-CM

## 2025-02-11 DIAGNOSIS — Z79.899 HIGH RISK MEDICATION USE: ICD-10-CM

## 2025-02-11 DIAGNOSIS — T45.1X5A CHEMOTHERAPY INDUCED NAUSEA AND VOMITING: Primary | ICD-10-CM

## 2025-02-11 DIAGNOSIS — C25.9 PANCREATIC ADENOCARCINOMA: Primary | ICD-10-CM

## 2025-02-11 DIAGNOSIS — R11.2 CHEMOTHERAPY INDUCED NAUSEA AND VOMITING: Primary | ICD-10-CM

## 2025-02-11 DIAGNOSIS — Z45.9 ENCOUNTER FOR MANAGEMENT OF IMPLANTED DEVICE: Primary | ICD-10-CM

## 2025-02-11 LAB
ALBUMIN SERPL-MCNC: 3.6 G/DL (ref 3.5–5.2)
ALBUMIN/GLOB SERPL: 1.3 G/DL
ALP SERPL-CCNC: 287 U/L (ref 39–117)
ALT SERPL W P-5'-P-CCNC: 43 U/L (ref 1–41)
ANION GAP SERPL CALCULATED.3IONS-SCNC: 9.4 MMOL/L (ref 5–15)
AST SERPL-CCNC: 18 U/L (ref 1–40)
BILIRUB SERPL-MCNC: 0.4 MG/DL (ref 0–1.2)
BUN SERPL-MCNC: 7 MG/DL (ref 8–23)
BUN/CREAT SERPL: 12.3 (ref 7–25)
CALCIUM SPEC-SCNC: 9.2 MG/DL (ref 8.6–10.5)
CHLORIDE SERPL-SCNC: 105 MMOL/L (ref 98–107)
CO2 SERPL-SCNC: 28.6 MMOL/L (ref 22–29)
CREAT SERPL-MCNC: 0.57 MG/DL (ref 0.76–1.27)
DEPRECATED RDW RBC AUTO: 53.1 FL (ref 37–54)
EGFRCR SERPLBLD CKD-EPI 2021: 106.1 ML/MIN/1.73
ERYTHROCYTE [DISTWIDTH] IN BLOOD BY AUTOMATED COUNT: 16 % (ref 12.3–15.4)
GLOBULIN UR ELPH-MCNC: 2.8 GM/DL
GLUCOSE SERPL-MCNC: 157 MG/DL (ref 65–99)
HCT VFR BLD AUTO: 33.9 % (ref 37.5–51)
HGB BLD-MCNC: 11.4 G/DL (ref 13–17.7)
LYMPHOCYTES # BLD MANUAL: 2.27 10*3/MM3 (ref 0.7–3.1)
LYMPHOCYTES NFR BLD MANUAL: 2 % (ref 5–12)
MCH RBC QN AUTO: 30.7 PG (ref 26.6–33)
MCHC RBC AUTO-ENTMCNC: 33.6 G/DL (ref 31.5–35.7)
MCV RBC AUTO: 91.4 FL (ref 79–97)
METAMYELOCYTES NFR BLD MANUAL: 2 % (ref 0–0)
MONOCYTES # BLD: 0.45 10*3/MM3 (ref 0.1–0.9)
NEUTROPHILS # BLD AUTO: 19.52 10*3/MM3 (ref 1.7–7)
NEUTROPHILS NFR BLD MANUAL: 86 % (ref 42.7–76)
PLAT MORPH BLD: NORMAL
PLATELET # BLD AUTO: 412 10*3/MM3 (ref 140–450)
PMV BLD AUTO: 9.6 FL (ref 6–12)
POTASSIUM SERPL-SCNC: 3.5 MMOL/L (ref 3.5–5.2)
PROT SERPL-MCNC: 6.4 G/DL (ref 6–8.5)
RBC # BLD AUTO: 3.71 10*6/MM3 (ref 4.14–5.8)
RBC MORPH BLD: NORMAL
SODIUM SERPL-SCNC: 143 MMOL/L (ref 136–145)
VARIANT LYMPHS NFR BLD MANUAL: 10 % (ref 19.6–45.3)
WBC MORPH BLD: NORMAL
WBC NRBC COR # BLD AUTO: 22.7 10*3/MM3 (ref 3.4–10.8)

## 2025-02-11 PROCEDURE — 25010000002 FOSAPREPITANT PER 1 MG: Performed by: INTERNAL MEDICINE

## 2025-02-11 PROCEDURE — 80053 COMPREHEN METABOLIC PANEL: CPT | Performed by: INTERNAL MEDICINE

## 2025-02-11 PROCEDURE — 96415 CHEMO IV INFUSION ADDL HR: CPT

## 2025-02-11 PROCEDURE — 25010000003 DEXTROSE 5 % SOLUTION 250 ML FLEX CONT: Performed by: INTERNAL MEDICINE

## 2025-02-11 PROCEDURE — 25010000002 DEXAMETHASONE SODIUM PHOSPHATE 100 MG/10ML SOLUTION: Performed by: INTERNAL MEDICINE

## 2025-02-11 PROCEDURE — 25010000002 IRINOTECAN PER 20 MG: Performed by: INTERNAL MEDICINE

## 2025-02-11 PROCEDURE — 25010000003 DEXTROSE 5 % SOLUTION: Performed by: INTERNAL MEDICINE

## 2025-02-11 PROCEDURE — 96411 CHEMO IV PUSH ADDL DRUG: CPT

## 2025-02-11 PROCEDURE — 85025 COMPLETE CBC W/AUTO DIFF WBC: CPT | Performed by: INTERNAL MEDICINE

## 2025-02-11 PROCEDURE — 25010000002 OXALIPLATIN PER 0.5 MG: Performed by: INTERNAL MEDICINE

## 2025-02-11 PROCEDURE — 25810000003 SODIUM CHLORIDE 0.9 % SOLUTION 500 ML FLEX CONT: Performed by: INTERNAL MEDICINE

## 2025-02-11 PROCEDURE — 96375 TX/PRO/DX INJ NEW DRUG ADDON: CPT

## 2025-02-11 PROCEDURE — 96368 THER/DIAG CONCURRENT INF: CPT

## 2025-02-11 PROCEDURE — 96417 CHEMO IV INFUS EACH ADDL SEQ: CPT

## 2025-02-11 PROCEDURE — 96367 TX/PROPH/DG ADDL SEQ IV INF: CPT

## 2025-02-11 PROCEDURE — 25810000003 SODIUM CHLORIDE 0.9 % SOLUTION 250 ML FLEX CONT: Performed by: INTERNAL MEDICINE

## 2025-02-11 PROCEDURE — 96366 THER/PROPH/DIAG IV INF ADDON: CPT

## 2025-02-11 PROCEDURE — 96413 CHEMO IV INFUSION 1 HR: CPT

## 2025-02-11 PROCEDURE — 25010000002 LEUCOVORIN CALCIUM PER 50 MG: Performed by: INTERNAL MEDICINE

## 2025-02-11 PROCEDURE — 25010000002 PALONOSETRON PER 25 MCG: Performed by: INTERNAL MEDICINE

## 2025-02-11 PROCEDURE — G0498 CHEMO EXTEND IV INFUS W/PUMP: HCPCS

## 2025-02-11 PROCEDURE — 85007 BL SMEAR W/DIFF WBC COUNT: CPT | Performed by: INTERNAL MEDICINE

## 2025-02-11 PROCEDURE — 25010000002 FLUOROURACIL PER 500 MG: Performed by: INTERNAL MEDICINE

## 2025-02-11 RX ORDER — FAMOTIDINE 10 MG/ML
20 INJECTION, SOLUTION INTRAVENOUS AS NEEDED
Status: CANCELLED | OUTPATIENT
Start: 2025-02-11

## 2025-02-11 RX ORDER — PALONOSETRON 0.05 MG/ML
0.25 INJECTION, SOLUTION INTRAVENOUS ONCE
Status: COMPLETED | OUTPATIENT
Start: 2025-02-11 | End: 2025-02-11

## 2025-02-11 RX ORDER — SODIUM CHLORIDE 0.9 % (FLUSH) 0.9 %
10 SYRINGE (ML) INJECTION AS NEEDED
Status: DISCONTINUED | OUTPATIENT
Start: 2025-02-11 | End: 2025-02-11 | Stop reason: HOSPADM

## 2025-02-11 RX ORDER — PALONOSETRON 0.05 MG/ML
0.25 INJECTION, SOLUTION INTRAVENOUS ONCE
Status: CANCELLED | OUTPATIENT
Start: 2025-02-11

## 2025-02-11 RX ORDER — DEXTROSE MONOHYDRATE 50 MG/ML
20 INJECTION, SOLUTION INTRAVENOUS ONCE
Status: CANCELLED | OUTPATIENT
Start: 2025-02-11

## 2025-02-11 RX ORDER — FLUOROURACIL 50 MG/ML
400 INJECTION, SOLUTION INTRAVENOUS ONCE
Status: CANCELLED | OUTPATIENT
Start: 2025-02-11

## 2025-02-11 RX ORDER — DEXTROSE MONOHYDRATE 50 MG/ML
20 INJECTION, SOLUTION INTRAVENOUS ONCE
Status: COMPLETED | OUTPATIENT
Start: 2025-02-11 | End: 2025-02-11

## 2025-02-11 RX ORDER — FLUOROURACIL 50 MG/ML
400 INJECTION, SOLUTION INTRAVENOUS ONCE
Status: COMPLETED | OUTPATIENT
Start: 2025-02-11 | End: 2025-02-11

## 2025-02-11 RX ORDER — ATROPINE SULFATE 1 MG/ML
0.25 INJECTION, SOLUTION INTRAMUSCULAR; INTRAVENOUS; SUBCUTANEOUS
Status: CANCELLED | OUTPATIENT
Start: 2025-02-11

## 2025-02-11 RX ORDER — SODIUM CHLORIDE 0.9 % (FLUSH) 0.9 %
10 SYRINGE (ML) INJECTION AS NEEDED
Status: CANCELLED | OUTPATIENT
Start: 2025-02-11

## 2025-02-11 RX ORDER — DIPHENHYDRAMINE HYDROCHLORIDE 50 MG/ML
50 INJECTION INTRAMUSCULAR; INTRAVENOUS AS NEEDED
Status: CANCELLED | OUTPATIENT
Start: 2025-02-11

## 2025-02-11 RX ORDER — PROCHLORPERAZINE MALEATE 10 MG
10 TABLET ORAL EVERY 6 HOURS PRN
Qty: 90 TABLET | Refills: 5 | Status: SHIPPED | OUTPATIENT
Start: 2025-02-11

## 2025-02-11 RX ORDER — HEPARIN SODIUM (PORCINE) LOCK FLUSH IV SOLN 100 UNIT/ML 100 UNIT/ML
500 SOLUTION INTRAVENOUS AS NEEDED
Status: DISCONTINUED | OUTPATIENT
Start: 2025-02-11 | End: 2025-02-11 | Stop reason: HOSPADM

## 2025-02-11 RX ORDER — HYDROCORTISONE SODIUM SUCCINATE 100 MG/2ML
100 INJECTION INTRAMUSCULAR; INTRAVENOUS AS NEEDED
Status: CANCELLED | OUTPATIENT
Start: 2025-02-11

## 2025-02-11 RX ORDER — HEPARIN SODIUM (PORCINE) LOCK FLUSH IV SOLN 100 UNIT/ML 100 UNIT/ML
500 SOLUTION INTRAVENOUS AS NEEDED
Status: CANCELLED | OUTPATIENT
Start: 2025-02-11

## 2025-02-11 RX ADMIN — DEXAMETHASONE SODIUM PHOSPHATE 12 MG: 10 INJECTION, SOLUTION INTRAMUSCULAR; INTRAVENOUS at 08:46

## 2025-02-11 RX ADMIN — OXALIPLATIN 160 MG: 5 INJECTION, SOLUTION INTRAVENOUS at 09:39

## 2025-02-11 RX ADMIN — IRINOTECAN HYDROCHLORIDE 280 MG: 20 INJECTION, SOLUTION INTRAVENOUS at 12:15

## 2025-02-11 RX ADMIN — DEXTROSE MONOHYDRATE 20 ML/HR: 50 INJECTION, SOLUTION INTRAVENOUS at 08:44

## 2025-02-11 RX ADMIN — ATROPINE SULFATE 0.25 MG: 0.4 INJECTION, SOLUTION INTRAVENOUS at 11:40

## 2025-02-11 RX ADMIN — FLUOROURACIL 740 MG: 50 INJECTION, SOLUTION INTRAVENOUS at 13:52

## 2025-02-11 RX ADMIN — FLUOROURACIL 4460 MG: 50 INJECTION, SOLUTION INTRAVENOUS at 13:56

## 2025-02-11 RX ADMIN — FOSAPREPITANT 100 ML: 150 INJECTION, POWDER, LYOPHILIZED, FOR SOLUTION INTRAVENOUS at 09:05

## 2025-02-11 RX ADMIN — LEUCOVORIN CALCIUM 740 MG: 350 INJECTION, POWDER, LYOPHILIZED, FOR SOLUTION INTRAMUSCULAR; INTRAVENOUS at 11:44

## 2025-02-11 RX ADMIN — PALONOSETRON 0.25 MG: 0.05 INJECTION, SOLUTION INTRAVENOUS at 08:44

## 2025-02-11 NOTE — NURSING NOTE
Pt arrived for D1C4 Folfirinox after being seen by Dr Castillo.  Pt tolerated today's treatment without incident. 5FU given slow IVP via free flowing D5W with brisk blood return noted before, during and after administration.  Pt discharged in stable condition with 5FU chemo ball securely attached to port with both clamps open and wife present.

## 2025-02-11 NOTE — PROGRESS NOTES
OUTPATIENT ONCOLOGY NUTRITION     Patient Name: Brad Bella  YOB: 1955  MRN: [id number]  Assessment Date: 2/11/2025    COMMENTS: Follow up assessment for pt with pancreatic adenocarcinoma (head of pancreas) and new onset diabetes.  Pt is receiving his #4 (of 6) infusion of FOLIFIRINOX.  Labs/Meds from today reviewed.  Glu 157, Hgb 11.4. On short and long acting insulin, has a meter with sensor. Taking Creon (2 capsules with meals and snacks)    Met with pt and his wife today in the infusion room.  They report he is eating decently, trying to incorporate carbs and protein at each meal. Doing better with nausea and showing improvement in his stool consistency with creon. He isn't drinking Boost Glucose Control as often. Weight is stable at 143lbs, he is hopeful to put on some weight.   He has noticed some swelling in his ankles and feet occasionally.  He still has a few Biolyte packets at home and encouraged him to only drink one on the day prior to his chemo.     Pt and wife have this RD's contact info for any questions. Will follow throughout treatment course and be available as needed. Pt and wife very appreciative of visit.        Reason for Assessment Follow up     Diagnosis/Problem   Pancreatic adenocarcinoma   Treatment Plan Chemotherapy FOLOFIRINOX   Frequency Q 2 weeks for 12 weeks; Poss partial pancreatectomy after chemo   Goal of cancer treatment Curative, Neoadjuvant   Comments: Free Creon         Encounter Information        Nutrition/Diet History:  Trying to eat low sugar with good carb to protein  ratio   Oral Nutrition Supplements: Boost GC occasionally    Factors/Symptoms Affecting Intake: Weight loss, Other  loose stools improved with Creon   Comments: States when he eats whole wheat bread, his hair has a funny texture.      Medical/Surgical History Past Medical History:   Diagnosis Date    Diabetes mellitus     Kidney stone 09/05/2020    Pancreatic adenocarcinoma        Past Surgical  "History:   Procedure Laterality Date    URETEROSCOPY LASER LITHOTRIPSY WITH STENT INSERTION Left 09/18/2020    Procedure: LFT URETEROSCOPY LASER LITHOTRIPSY WITH STENT INSERTION;  Surgeon: James Torres MD;  Location: [facility];  Service: Urology;  Laterality: Left;    VASECTOMY                 Anthropometrics        Current Height Ht Readings from Last 1 Encounters:   02/11/25 180 cm (70.87\")      Current Weight Wt Readings from Last 1 Encounters:   02/11/25 64.9 kg (143 lb 1.6 oz)      BMI  20   Usual Body Weight (UBW) 165-170lb   Weight Change/Trend Loss   Weight History Wt Readings from Last 30 Encounters:   02/11/25 0747 64.9 kg (143 lb 1.6 oz)   01/28/25 0815 65.1 kg (143 lb 8 oz)   01/14/25 0825 65 kg (143 lb 4.8 oz)   01/13/25 1203 64.9 kg (143 lb)   12/31/24 0940 65.3 kg (144 lb)   12/18/24 1501 66.4 kg (146 lb 4.8 oz)   12/13/24 1441 67.9 kg (149 lb 12.8 oz)   12/10/24 0925 69 kg (152 lb 3.2 oz)   12/09/24 1345 69.4 kg (153 lb)   12/03/24 1532 70.2 kg (154 lb 12.8 oz)   11/26/24 1103 72.1 kg (159 lb)   11/25/24 1005 72.2 kg (159 lb 3.2 oz)   09/18/20 0858 76.7 kg (169 lb 3.2 oz)   09/05/20 1005 77.1 kg (170 lb)          Medications           Current medications: FreeStyle Cassie 3 Sensor, Glucagon, Insulin Aspart (w/Niacinamide), Insulin Degludec, Insulin Pen Needle, ONE TOUCH ULTRA 2, OneTouch UltraSoft 2 Lancets, Pancrelipase (Lip-Prot-Amyl), glucose, glucose blood, insulin aspart, lidocaine-prilocaine, and ondansetron                 Tests/Procedures        Tests/Procedures Chemotherapy     Labs       Pertinent Labs    Results from last 7 days   Lab Units 02/11/25  0735   SODIUM mmol/L 143   POTASSIUM mmol/L 3.5   CHLORIDE mmol/L 105   CO2 mmol/L 28.6   BUN mg/dL 7*   CREATININE mg/dL 0.57*   CALCIUM mg/dL 9.2   BILIRUBIN mg/dL 0.4   ALK PHOS U/L 287*   ALT (SGPT) U/L 43*   AST (SGOT) U/L 18   GLUCOSE mg/dL 157*     Results from last 7 days   Lab Units 02/11/25  0735   HEMOGLOBIN g/dL " 11.4*   HEMATOCRIT % 33.9*   WBC 10*3/mm3 22.70*   ALBUMIN g/dL 3.6     Results from last 7 days   Lab Units 02/11/25  0735   PLATELETS 10*3/mm3 412     COVID19   Date Value Ref Range Status   09/18/2020 Not Detected Not Detected - Ref. Range Final     Lab Results   Component Value Date    HGBA1C 8.1 (H) 11/25/2024          Physical Findings        Physical Appearance alert, loss of muscle mass, loss of subcutaneous fat, oriented, underweight     Edema  lower extremity , 1+ (trace)   Gastrointestinal None    Tubes/Lines/Drains Implantable Port   Oral/Mouth Cavity WNL   Skin Intact       Estimated/Assessed Needs        Energy Requirements    Height for Calculation      Weight for Calculation 65 kg   Method for Estimation  30 kcal/kg   EST Needs (kcal/day) 1950 kcals per day       Protein Requirements    Weight for Calculation 65 kg   EST Protein Needs (g/kg) 1.2 - 1.5 gm/kg   EST Daily Needs (g/day) 78-97 gms per day       Fluid Requirements     Method for Estimation 1 mL/kcal    Estimated Needs (mL/day) 1900 mLs per day         NUTRITION INTERVENTION / PLAN OF CARE      Intervention Goal(s) Maintain nutrition status, Provide information, Meet estimated needs, Disease management/therapy, Tolerate PO , Increase intake, and Appropriate weight gain         RD Intervention/Action Encouraged intake, Follow Tx progress, Recommended/ordered         Recommendations:       PO Diet Consume calorie and protein dense consistent carb diet      Supplements Boost Glucose Control, try fairlife      Snacks       Other    New Prescription Ordered? No, recommend   --      Monitor/Evaluation Follow up as needed   Education Education provided   --    Electronically signed by:  Shania Abbott RD  02/11/25 10:03 EST

## 2025-02-12 DIAGNOSIS — C25.9 PANCREATIC ADENOCARCINOMA: Primary | ICD-10-CM

## 2025-02-13 ENCOUNTER — INFUSION (OUTPATIENT)
Dept: ONCOLOGY | Facility: HOSPITAL | Age: 70
End: 2025-02-13
Payer: MEDICARE

## 2025-02-13 DIAGNOSIS — Z79.899 HIGH RISK MEDICATION USE: ICD-10-CM

## 2025-02-13 DIAGNOSIS — C25.9 PANCREATIC ADENOCARCINOMA: Primary | ICD-10-CM

## 2025-02-13 DIAGNOSIS — Z45.9 ENCOUNTER FOR MANAGEMENT OF IMPLANTED DEVICE: ICD-10-CM

## 2025-02-13 PROCEDURE — 25010000002 HEPARIN LOCK FLUSH PER 10 UNITS: Performed by: NURSE PRACTITIONER

## 2025-02-13 RX ORDER — SODIUM CHLORIDE 0.9 % (FLUSH) 0.9 %
10 SYRINGE (ML) INJECTION AS NEEDED
Status: DISCONTINUED | OUTPATIENT
Start: 2025-02-13 | End: 2025-02-13 | Stop reason: HOSPADM

## 2025-02-13 RX ORDER — SODIUM CHLORIDE 0.9 % (FLUSH) 0.9 %
10 SYRINGE (ML) INJECTION AS NEEDED
OUTPATIENT
Start: 2025-02-13

## 2025-02-13 RX ORDER — HEPARIN SODIUM (PORCINE) LOCK FLUSH IV SOLN 100 UNIT/ML 100 UNIT/ML
500 SOLUTION INTRAVENOUS AS NEEDED
OUTPATIENT
Start: 2025-02-13

## 2025-02-13 RX ORDER — HEPARIN SODIUM (PORCINE) LOCK FLUSH IV SOLN 100 UNIT/ML 100 UNIT/ML
500 SOLUTION INTRAVENOUS AS NEEDED
Status: DISCONTINUED | OUTPATIENT
Start: 2025-02-13 | End: 2025-02-13 | Stop reason: HOSPADM

## 2025-02-13 RX ADMIN — Medication 10 ML: at 13:49

## 2025-02-13 RX ADMIN — Medication 500 UNITS: at 13:49

## 2025-02-14 ENCOUNTER — INFUSION (OUTPATIENT)
Dept: ONCOLOGY | Facility: HOSPITAL | Age: 70
End: 2025-02-14
Payer: MEDICARE

## 2025-02-14 DIAGNOSIS — Z79.899 HIGH RISK MEDICATION USE: ICD-10-CM

## 2025-02-14 DIAGNOSIS — C25.9 PANCREATIC ADENOCARCINOMA: Primary | ICD-10-CM

## 2025-02-14 PROCEDURE — 25010000002 PEGFILGRASTIM-FPGK 6 MG/0.6ML SOLUTION PREFILLED SYRINGE: Performed by: INTERNAL MEDICINE

## 2025-02-14 PROCEDURE — 96372 THER/PROPH/DIAG INJ SC/IM: CPT

## 2025-02-14 RX ADMIN — PEGFLILGRASTIM-FPGK 6 MG: 6 INJECTION, SOLUTION SUBCUTANEOUS at 14:17

## 2025-02-18 ENCOUNTER — HOSPITAL ENCOUNTER (OUTPATIENT)
Dept: CT IMAGING | Facility: HOSPITAL | Age: 70
Discharge: HOME | End: 2025-02-18
Admitting: INTERNAL MEDICINE
Payer: MEDICARE

## 2025-02-18 DIAGNOSIS — C25.9 PANCREATIC ADENOCARCINOMA: ICD-10-CM

## 2025-02-18 PROCEDURE — 25510000001 IOPAMIDOL 61 % SOLUTION: Performed by: INTERNAL MEDICINE

## 2025-02-18 PROCEDURE — 71260 CT THORAX DX C+: CPT

## 2025-02-18 PROCEDURE — 74178 CT ABD&PLV WO CNTR FLWD CNTR: CPT

## 2025-02-18 RX ORDER — IOPAMIDOL 612 MG/ML
100 INJECTION, SOLUTION INTRAVASCULAR
Status: COMPLETED | OUTPATIENT
Start: 2025-02-18 | End: 2025-02-18

## 2025-02-18 RX ADMIN — IOPAMIDOL 85 ML: 612 INJECTION, SOLUTION INTRAVENOUS at 13:00

## 2025-02-19 ENCOUNTER — TELEPHONE (OUTPATIENT)
Dept: ONCOLOGY | Facility: CLINIC | Age: 70
End: 2025-02-19
Payer: MEDICARE

## 2025-02-19 DIAGNOSIS — C25.9 PANCREATIC ADENOCARCINOMA: Primary | ICD-10-CM

## 2025-02-19 NOTE — PROGRESS NOTES
.     REASON FOR FOLLOWUP :   pancreatic adenocarcinoma (localized.  Goal is cure)    HISTORY OF PRESENT ILLNESS:  The patient is a 69 y.o. year old male  who is here for follow-up with the above-mentioned history.    Fatigue.  Altered taste.  Minimal nausea.  Does not think Zofran or Compazine really helped but states this is tolerable.  Bowel movements unremarkable.  Denies any neuropathy at room temperature.    Past Medical History:   Diagnosis Date    Diabetes mellitus     Kidney stone 09/05/2020    Pancreatic adenocarcinoma      Past Surgical History:   Procedure Laterality Date    URETEROSCOPY LASER LITHOTRIPSY WITH STENT INSERTION Left 09/18/2020    Procedure: LFT URETEROSCOPY LASER LITHOTRIPSY WITH STENT INSERTION;  Surgeon: James Torres MD;  Location: [facility];  Service: Urology;  Laterality: Left;    VASECTOMY         MEDICATIONS    Current Outpatient Medications:     Blood Glucose Monitoring Suppl (ONE TOUCH ULTRA 2) w/Device kit, 1 time daily before breakfast, Disp: 1 each, Rfl: 0    Continuous Glucose Sensor (FreeStyle Cassie 3 Sensor) misc, Use 1 Units Every 14 (Fourteen) Days., Disp: 2 each, Rfl: 2    Glucagon (Gvoke HypoPen 2-Pack) 1 MG/0.2ML solution auto-injector, Inject 1 mg under the skin into the appropriate area as directed Daily As Needed (hypoglycemia)., Disp: 0.4 mL, Rfl: 1    glucose (B-D GLUCOSE) 5 g chewable tablet, Chew 3 tablets As Needed for Low Blood Sugar. Take 3 tablets for low blood sugar less than 80 (Patient not taking: Reported on 12/10/2024), Disp: 50 tablet, Rfl: 2    glucose blood test strip, 1 times daily before breakfast, Disp: 100 each, Rfl: 3    insulin aspart (NovoLOG FlexPen) 100 UNIT/ML solution pen-injector sc pen, Inject 2 Units under the skin into the appropriate area as directed 3 (Three) Times a Day With Meals., Disp: 15 mL, Rfl: 1    Insulin Aspart, w/Niacinamide, (Fiasp FlexTouch) 100 UNIT/ML solution pen-injector, Inject 2 Units under the  skin into the appropriate area as directed 3 (Three) Times a Day., Disp: 15 mL, Rfl: 2    Insulin Degludec (Tresiba FlexTouch) 200 UNIT/ML solution pen-injector pen injection, Inject 8 Units under the skin into the appropriate area as directed Every Night., Disp: 3.6 mL, Rfl: 3    Insulin Glargine (LANTUS SOLOSTAR) 100 UNIT/ML injection pen, Inject 8 Units under the skin into the appropriate area as directed Daily., Disp: 15 mL, Rfl: 0    Insulin Pen Needle (BD Pen Needle Norma 2nd Gen) 32G X 4 MM misc, Use 1 Units 4 (Four) Times a Day., Disp: 100 each, Rfl: 0    Insulin Pen Needle (BD Pen Needle Norma U/F) 32G X 4 MM misc, Use 1 Units 4 (Four) Times a Day., Disp: 100 each, Rfl: 1    lidocaine-prilocaine (EMLA) 2.5-2.5 % cream, Apply 1 Application topically to the appropriate area as directed As Needed for Mild Pain or Injection Site Pain., Disp: 30 g, Rfl: 3    ondansetron (ZOFRAN) 8 MG tablet, Take 1 tablet by mouth 3 (Three) Times a Day As Needed for Nausea or Vomiting., Disp: 30 tablet, Rfl: 5    OneTouch UltraSoft 2 Lancets misc, Use 1 each Every Morning Before Breakfast., Disp: 100 each, Rfl: 2    Pancrelipase, Lip-Prot-Amyl, (Creon) 76115-644298 units capsule delayed-release particles capsule, Take 2 capsules by mouth 3 (Three) Times a Day With Meals. And take 2 capsules with snacks., Disp: 500 capsule, Rfl: 1    prochlorperazine (COMPAZINE) 10 MG tablet, Take 1 tablet by mouth Every 6 (Six) Hours As Needed for Nausea or Vomiting., Disp: 90 tablet, Rfl: 5  No current facility-administered medications for this visit.    ALLERGIES:   No Known Allergies    SOCIAL HISTORY:       Social History     Socioeconomic History    Marital status:      Spouse name: Debbie   Tobacco Use    Smoking status: Every Day     Types: Pipe    Smokeless tobacco: Never   Vaping Use    Vaping status: Never Used   Substance and Sexual Activity    Alcohol use: Not Currently    Drug use: Never    Sexual activity: Yes     Partners:  Female     Birth control/protection: Vasectomy         FAMILY HISTORY:  Family History   Problem Relation Age of Onset    Diabetes Mother     Heart attack Father     Pancreatic cancer Sister 69       REVIEW OF SYSTEMS:  Review of Systems   Constitutional:  Negative for activity change.   HENT:  Negative for nosebleeds and trouble swallowing.    Respiratory:  Negative for shortness of breath and wheezing.    Cardiovascular:  Negative for chest pain and palpitations.   Gastrointestinal:  Positive for nausea. Negative for constipation and diarrhea.   Genitourinary:  Negative for dysuria and hematuria.   Musculoskeletal:  Negative for arthralgias and myalgias.   Neurological:  Negative for seizures and syncope.   Hematological:  Negative for adenopathy. Does not bruise/bleed easily.   Psychiatric/Behavioral:  Negative for confusion.               There were no vitals filed for this visit.            2/11/2025     7:48 AM   Current Status   ECOG score 0      PHYSICAL EXAM:      CONSTITUTIONAL:     No distress, looks comfortable.  EYES:  Conjunctiva and lids unremarkable.  Extraocular eye movements intact.  EARS,NOSE,MOUTH,THROAT:  Ears and nose appear unremarkable.  Lips, teeth, gums appear unremarkable.  RESPIRATORY:  Normal respiratory effort.    CARDIOVASCULAR:    No significant lower extremity edema.  SKIN: No wounds.  No rashes.  MUSCULOSKELETAL/EXTREMITIES: No clubbing or cyanosis.  No apparent unilateral weakness.  NEURO: CN 2-12 appear intact. No focal neurological deficits noted.  PSYCHIATRIC:  Normal judgment and insight.  Normal mood and affect.     RECENT LABS:        WBC   Date Value Ref Range Status   02/11/2025 22.70 (H) 3.40 - 10.80 10*3/mm3 Final   01/28/2025 25.77 (H) 3.40 - 10.80 10*3/mm3 Final   01/14/2025 14.31 (H) 3.40 - 10.80 10*3/mm3 Final   12/31/2024 10.14 3.40 - 10.80 10*3/mm3 Final   12/03/2024 9.67 3.40 - 10.80 10*3/mm3 Final   11/25/2024 7.6 3.4 - 10.8 x10E3/uL Final     Comment:      **Effective December 2, 2024 profile [id number] WBC will be made**    non-orderable as a stand-alone order code.     09/18/2020 17.04 (H) 3.40 - 10.80 10*3/mm3 Final   09/05/2020 13.70 (H) 3.40 - 10.80 10*3/mm3 Final     Hemoglobin   Date Value Ref Range Status   02/11/2025 11.4 (L) 13.0 - 17.7 g/dL Final   01/28/2025 11.6 (L) 13.0 - 17.7 g/dL Final   01/14/2025 11.6 (L) 13.0 - 17.7 g/dL Final   12/31/2024 12.3 (L) 13.0 - 17.7 g/dL Final   12/03/2024 13.1 13.0 - 17.7 g/dL Final   11/25/2024 14.2 13.0 - 17.7 g/dL Final   09/18/2020 14.1 13.0 - 17.7 g/dL Final   09/05/2020 14.7 13.0 - 17.7 g/dL Final     Platelets   Date Value Ref Range Status   02/11/2025 412 140 - 450 10*3/mm3 Final   01/28/2025 426 140 - 450 10*3/mm3 Final   01/14/2025 409 140 - 450 10*3/mm3 Final   12/31/2024 444 140 - 450 10*3/mm3 Final   12/03/2024 459 (H) 140 - 450 10*3/mm3 Final   11/25/2024 435 150 - 450 x10E3/uL Final   09/18/2020 517 (H) 140 - 450 10*3/mm3 Final   09/05/2020 448 140 - 450 10*3/mm3 Final       Assessment & Plan   There are no diagnoses linked to this encounter.        Brad Bella               *Pancreatic adenocarcinoma (head of pancreas)   Reviewed PCP note from 11/26/2024: New onset diabetes, elevated LFTs, bilirubin, and alkaline phosphatase.  CT AP with contrast, 11/26/2024, from 9/18/2020: Pancreatic head mass, 2.7 x 2.2 x 2.1 cm.  Abnormal intrahepatic and extrahepatic biliary ductal dilation compatible with biliary obstruction.  No evidence of liver metastasis.  Scattered small mesenteric and periaortic nodes, not significant by size criteria.  Lung bases: New pulmonary nodule, LLL, 8 mm.  Stable LLL 4 mm nodule and stable RML pleural-based 3 mm nodule  Neoadjuvant FOLFIRINOX versus Gemzar Abraxane (without XRT)  SWOG  compare these 2 regimens, 12 weeks preoperative and 12 weeks postoperative treatment and 102 patients with potentially resectable pancreatic cancer.  Gemzar Abraxane had better results  Up-to-date  recommends neoadjuvant modified FOLFIRINOX followed by chemo XRT if patient is felt to be able to tolerate  Up-to-date authors consider upfront pancreatectomy followed by adjuvant chemotherapy to be an equally accepted approach as compared to upfront chemo XRT followed by pancreatectomy  12/3/2024 (initial consult): Bilirubin up to 19.8 from 6.8 on 11/25/2024.  Discussed with Dr. Monse Gutierrez who offered admission to the patient but patient declined.  He is going to do an ERCP for the patient tomorrow as an outpatient to hopefully obtain a tissue diagnosis and perform biliary stenting to relieve the hyperbilirubinemia.  He will also perform a CT of the chest (chest imaging has not yet been done), and repeat abdominal imaging, three-phase, to assess for vessel involvement to assess for resectability.  He request we arrange the PET scan at [facility] which we will do.  Patient denies neuropathy.  I discussed FOLFIRINOX with the patient as I expect that we will be the regimen I recommend for him.  I reviewed side effects and schedule.  I told him I would defer to Dr. Gutierrez sequencing of surgery and chemo and defer to Dr. Gutierrez if radiation will be recommended  I explained to patient if metastatic disease is found, this is not curable.  Fortunately, no clear evidence of metastatic disease on CT of the abdomen and pelvis.  Patient states if at some point this is determined to not be curable, he will likely want chemo, but wants the main focus to be on quality of life  CT chest and abdomen pelvis three-phase, 12/4/ 24 ([facility]): Multiple bilateral lung nodules..  Radiologist stated differential includes fungal and inflammatory nodules and lung metastasis.  Branching tubular opacity NERY with morphology favoring chronic mucoid impaction.  Posterior pleural plaquing lower lobes, radiologist states differential includes prior asbestos and perhaps neurogenic tumors such as neurofibromas, radiologist states unlikely to be  metastasis.  3.4 x 3.2 cm pancreatic head mass with involvement of periampullary region medial wall of second portion of duodenum and obstruction of pancreatic and common bile ducts.  No vascular involvement or evidence of metastatic disease elsewhere in the abdomen or pelvis.  Numerous cystic lesions throughout the pancreas suggesting multiple sidebranch IPMN's measuring up to 2.9 cm.  EUS ERCP with stent placement x 3 and a sphincterotomy, Dr. Gutierrez, 12/4/ 24: Adenocarcinoma.  PET 12/10/2024: Pancreatic head mass 3.4 x 2.4 cm, SUV 10.2.  No hypermetabolic retroperitoneal LAD.  Nodes at reno hepatis of max SUV 2.4 (mediastinal blood pool SUV was 1.9.) multiple bilateral noncalcified pleural plaques with max SUV 1.4.  Photopenic serpiginous curvilinear nodular consolidation NERY which radiologist stated may represent old mucous plugging.  The only hypermetabolic lung opacity is left apex, nonspecific, 1 cm nodular density with some groundglass adjacently, max SUV 5.4.  Radiologist stated opacity may be infectious or inflammatory but could not exclude malignancy especially considering moderate emphysema findings in the lungs.  Radiologist recommended follow-up noncontrast CT chest 6 to 8 weeks.  Therefore, no clear evidence of metastatic disease.  12/13/2024 office visit: Explained the goal is cure.    Dr. Gutierrez recommends neoadjuvant FOLFIRINOX with CT at [facility] after every 2 months (maximum of 6 months).  Check with him after each CT, with a plan for resection at some point (possibly without any chemoradiation)  Although the goal is cure patient is leaning towards not taking any therapy and instead of enrolling in hospice.  Long discussion with him encouraging him to try therapy knowing he can stop at any time.  He is worried about side effects of chemo and worried about out-of-pocket cost.  I have asked our billing department to discuss with him estimated out-of-pocket cost for upcoming treatments  He  "decided to take treatment  12/31/2024: Began neoadjuvant FOLFIRINOX  2/11/2025 is cycle 4  CT 2/18/2025 (after C4):   No change multiple bilateral lung nodules (hoping these do not represent malignancy as only 1 was hypermetabolic on PET from 12/10/2024 read as nonspecific).  Ill-defined increased density RUL, 4.4 x 2.5 cm new from 12/10/2024 read as may represent an area of pneumonia  2/21/2025 office visit: No symptoms of pneumonia.  Report sent to Dr. Gutierrez.  He states he will look at the scans but recommends continuing chemo  2/21/2025: Discussed with Dr. Gutierrez.  He states the patient's pancreas is so cystic he would functionally have a total pancreatectomy after surgery.  He is going to discuss with the patient in more detail.  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  No numbness.  No interference with function.    *Hyperbilirubinemia  11/25/2024: Total bilirubin 6.8.  Direct bilirubin 4.8.  12/3/2024: Bilirubin 19.8.  Urgent ERCP with stent  12/10/2024: Bilirubin 7  12/13/2024: Bilirubin 5.2  Cannot use Irinotecan until bilirubin <2 (no guidelines for reduction from  regarding AST/ALT)  Okay to use oxaliplatin regardless of bilirubin/LFTs  Okay to use 5-FU with \"mild to moderate hepatic impairment without concomitant renal impairment.  Severe impairment: Use is not recommended\" per .  Gemzar: If bilirubin >1.6, use initial dose 800 mg/m2 and may escalate as tolerated.  Abraxane: Cannot use if bilirubin is >5.  Cannot use if any elevation of AST and bilirubin >1.5  Bilirubin 0.4    *Anemia  Hb 11.4    *Family history of pancreatic cancer (sister).  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  I do not see any association of this mutation with pancreatic cancer, but patient had a sister with pancreatic cancer  12/13/2024: Referred to [facility] genetics clinic    *New onset diabetes, likely due to pancreatic cancer  Was due for outpatient endocrinology consult 12/4/ 24, range through PCP.  This will need to be rescheduled due to the urgent need for biliary stenting    *Weight loss  Referred to oncology dietitian on 12/13/2024  Started Creon per Dr. Gutierrez recommendation (ordered through us)  Dietitian is following  2/11/2025: Weight has been stable since beginning chemo.    *Nausea  2/11/2025: Add Compazine  2/21/2025: States nausea is minimal.  Does not really think Zofran or Compazine helps but does not want to try anything else.  States this is tolerable and manageable    *CT 2/18/2025 brought up the possibility of pneumonia.  I asked Elías to call to check on him and give Levaquin 750 mg oral daily x 7 days if symptomatic.  He denied symptoms.    Plan  CA 19-9 on Tuesday and built into the treatment plan going forward.  Dr. Gutierrez is going to see the patient probably sometime the week of March 3  Appointments have been arranged through [facility] which is cycle 12  MD or NP and chemo has been arranged through cycle 12.  CT after cycle 4, after cycle 9, after cycle 12  Requested a visit with Dr. Gutierrez after the CT after cycle 12 and an appointment with an NP after that ([place] will be likely out of the [place] at that time)  I asked Carla and Elías to make sure he has an appointment with Dr. Gutierrez after the 6/9 CT and an appoint with NP after that  The CT scans are chest abdomen and pelvis with and without contrast [triple phase].  Please make sure they are ordered as triple phase (he declines oral contrast)    Referral to the genetics clinic.  2/11/2025: I reminded him of the importance of seeing genetics.  He is not  sure if he is going to do this.  He will think about this.    2 UNITS EVERY VISIT. NEVER OVERBOOK     71 minutes.  Total time.  Same day.    The patient provided consent for the video visit.  Patient's identity was verified.  The patient was at home.  I was in the office.  Telehealth was used due to the viral pandemic.

## 2025-02-19 NOTE — TELEPHONE ENCOUNTER
Patient denied and signs or symptoms of pneumonia. He was educated on the s/s and will call if he develops any.

## 2025-02-19 NOTE — TELEPHONE ENCOUNTER
----- Message from Reed Castillo sent at 2/19/2025 12:35 PM EST -----  Renato Mckinley    Can you please call and check on him.  Please tell him his CT from yesterday brought up the possibility of pneumonia.  Please tell him CT scans are read like this lots of times bringing up the question of pneumonia.  If he is not having any significant symptoms then we do not need to do anything about it.  However if he is having fever or increased shortness of breath or increased cough with sputum production please send him in a prescription for Levaquin 750 mg oral daily x 7 days

## 2025-02-19 NOTE — TELEPHONE ENCOUNTER
----- Message from Marline ROUSE sent at 2/19/2025  1:15 PM EST -----  Elías - if you can enter in the referral. Cristina if you can arrange. Please let me know when Dr Gutierrez mylene't is so I can set up the NP visit then please.    Jacquelin - thank you all - Carla  ----- Message -----  From: Reed Castillo II, MD  Sent: 2/19/2025  12:32 PM EST  To: Elías Noe RN; #    Hi Carla,    He has lots of appointments made.  Everything looks correct except I want to make sure he has an appointment with Dr. Gutierrez at least a few days after the June 9 CT scan  And I want to make sure he has an NP appointment scheduled at least a few days after the Dr. Gutierrez appointment.  · Requested a visit with Dr. Gutierrez after the CT (CT used to 9) after cycle 12 (cycle 12 is [facility]) and an appointment with an NP after that (I will be likely out of the country at that time)    I requested this previously but I just want to make sure it gets done    Thanks!

## 2025-02-21 ENCOUNTER — TELEMEDICINE (OUTPATIENT)
Dept: ONCOLOGY | Facility: CLINIC | Age: 70
End: 2025-02-21
Payer: MEDICARE

## 2025-02-21 ENCOUNTER — TELEPHONE (OUTPATIENT)
Dept: ONCOLOGY | Facility: CLINIC | Age: 70
End: 2025-02-21
Payer: MEDICARE

## 2025-02-21 DIAGNOSIS — Z79.899 HIGH RISK MEDICATION USE: ICD-10-CM

## 2025-02-21 DIAGNOSIS — C25.9 PANCREATIC ADENOCARCINOMA: Primary | ICD-10-CM

## 2025-02-21 NOTE — TELEPHONE ENCOUNTER
"\"Please call him and let him know I talked to Dr. Gutierrez and Dr. Gutierrez would like to see him in the office. Dr. Gutierrez office is going to call him to make an appointment. Dr. Gutierrez would like to review his scan and discuss timing of surgery with him. \"    Reviewed Dr. Castillo's message with the patient, he v/u. I instructed the patient to call us if he has not heard from them by the end of next week, he v/u.   "

## 2025-02-24 DIAGNOSIS — Z79.4 TYPE 2 DIABETES MELLITUS WITH HYPERGLYCEMIA, WITH LONG-TERM CURRENT USE OF INSULIN: ICD-10-CM

## 2025-02-24 DIAGNOSIS — E11.65 TYPE 2 DIABETES MELLITUS WITH HYPERGLYCEMIA, WITH LONG-TERM CURRENT USE OF INSULIN: ICD-10-CM

## 2025-02-24 RX ORDER — PEN NEEDLE, DIABETIC 32GX 5/32"
1 NEEDLE, DISPOSABLE MISCELLANEOUS 4 TIMES DAILY
Qty: 100 EACH | Refills: 0 | Status: SHIPPED | OUTPATIENT
Start: 2025-02-24

## 2025-02-24 NOTE — TELEPHONE ENCOUNTER
Rx Refill Note  Requested Prescriptions     Pending Prescriptions Disp Refills    Insulin Pen Needle (BD Pen Needle Norma 2nd Gen) 32G X 4 MM misc 100 each 0     Sig: Use 1 Units 4 (Four) Times a Day.      Last office visit with prescribing clinician: 1/13/2025   Last telemedicine visit with prescribing clinician: Visit date not found   Next office visit with prescribing clinician: 3/17/2025                         Would you like a call back once the refill request has been completed: [] Yes [] No    If the office needs to give you a call back, can they leave a voicemail: [] Yes [] No    Kelsy Heck MA  02/24/25, 08:28 EST

## 2025-02-25 ENCOUNTER — INFUSION (OUTPATIENT)
Dept: ONCOLOGY | Facility: HOSPITAL | Age: 70
End: 2025-02-25
Payer: MEDICARE

## 2025-02-25 VITALS
DIASTOLIC BLOOD PRESSURE: 79 MMHG | HEART RATE: 67 BPM | OXYGEN SATURATION: 97 % | TEMPERATURE: 97.5 F | RESPIRATION RATE: 16 BRPM | SYSTOLIC BLOOD PRESSURE: 136 MMHG | WEIGHT: 139 LBS | BODY MASS INDEX: 19.46 KG/M2

## 2025-02-25 DIAGNOSIS — Z79.899 HIGH RISK MEDICATION USE: Primary | ICD-10-CM

## 2025-02-25 DIAGNOSIS — C25.9 PANCREATIC ADENOCARCINOMA: ICD-10-CM

## 2025-02-25 LAB
ALBUMIN SERPL-MCNC: 3.6 G/DL (ref 3.5–5.2)
ALBUMIN/GLOB SERPL: 1.2 G/DL
ALP SERPL-CCNC: 325 U/L (ref 39–117)
ALT SERPL W P-5'-P-CCNC: 25 U/L (ref 1–41)
ANION GAP SERPL CALCULATED.3IONS-SCNC: 9.5 MMOL/L (ref 5–15)
AST SERPL-CCNC: 17 U/L (ref 1–40)
BASOPHILS # BLD AUTO: 0.11 10*3/MM3 (ref 0–0.2)
BASOPHILS NFR BLD AUTO: 0.5 % (ref 0–1.5)
BILIRUB SERPL-MCNC: 0.3 MG/DL (ref 0–1.2)
BUN SERPL-MCNC: 11 MG/DL (ref 8–23)
BUN/CREAT SERPL: 19.6 (ref 7–25)
CALCIUM SPEC-SCNC: 9.4 MG/DL (ref 8.6–10.5)
CANCER AG19-9 SERPL-ACNC: 528 U/ML
CHLORIDE SERPL-SCNC: 101 MMOL/L (ref 98–107)
CO2 SERPL-SCNC: 28.5 MMOL/L (ref 22–29)
CREAT SERPL-MCNC: 0.56 MG/DL (ref 0.76–1.27)
DEPRECATED RDW RBC AUTO: 51.8 FL (ref 37–54)
EGFRCR SERPLBLD CKD-EPI 2021: 106.7 ML/MIN/1.73
EOSINOPHIL # BLD AUTO: 0.22 10*3/MM3 (ref 0–0.4)
EOSINOPHIL NFR BLD AUTO: 0.9 % (ref 0.3–6.2)
ERYTHROCYTE [DISTWIDTH] IN BLOOD BY AUTOMATED COUNT: 15.7 % (ref 12.3–15.4)
GLOBULIN UR ELPH-MCNC: 3.1 GM/DL
GLUCOSE SERPL-MCNC: 132 MG/DL (ref 65–99)
HCT VFR BLD AUTO: 34.6 % (ref 37.5–51)
HGB BLD-MCNC: 11.5 G/DL (ref 13–17.7)
IMM GRANULOCYTES # BLD AUTO: 1.17 10*3/MM3 (ref 0–0.05)
IMM GRANULOCYTES NFR BLD AUTO: 4.9 % (ref 0–0.5)
LYMPHOCYTES # BLD AUTO: 3.06 10*3/MM3 (ref 0.7–3.1)
LYMPHOCYTES NFR BLD AUTO: 12.7 % (ref 19.6–45.3)
MCH RBC QN AUTO: 30.2 PG (ref 26.6–33)
MCHC RBC AUTO-ENTMCNC: 33.2 G/DL (ref 31.5–35.7)
MCV RBC AUTO: 90.8 FL (ref 79–97)
MONOCYTES # BLD AUTO: 1.7 10*3/MM3 (ref 0.1–0.9)
MONOCYTES NFR BLD AUTO: 7 % (ref 5–12)
NEUTROPHILS NFR BLD AUTO: 17.86 10*3/MM3 (ref 1.7–7)
NEUTROPHILS NFR BLD AUTO: 74 % (ref 42.7–76)
NRBC BLD AUTO-RTO: 0 /100 WBC (ref 0–0.2)
PLATELET # BLD AUTO: 454 10*3/MM3 (ref 140–450)
PMV BLD AUTO: 9.2 FL (ref 6–12)
POTASSIUM SERPL-SCNC: 3.9 MMOL/L (ref 3.5–5.2)
PROT SERPL-MCNC: 6.7 G/DL (ref 6–8.5)
RBC # BLD AUTO: 3.81 10*6/MM3 (ref 4.14–5.8)
SODIUM SERPL-SCNC: 139 MMOL/L (ref 136–145)
WBC NRBC COR # BLD AUTO: 24.12 10*3/MM3 (ref 3.4–10.8)

## 2025-02-25 PROCEDURE — 96413 CHEMO IV INFUSION 1 HR: CPT

## 2025-02-25 PROCEDURE — 25010000002 PALONOSETRON PER 25 MCG: Performed by: NURSE PRACTITIONER

## 2025-02-25 PROCEDURE — 96367 TX/PROPH/DG ADDL SEQ IV INF: CPT

## 2025-02-25 PROCEDURE — 25010000002 IRINOTECAN PER 20 MG: Performed by: NURSE PRACTITIONER

## 2025-02-25 PROCEDURE — 25010000002 DEXAMETHASONE SODIUM PHOSPHATE 100 MG/10ML SOLUTION: Performed by: NURSE PRACTITIONER

## 2025-02-25 PROCEDURE — G0498 CHEMO EXTEND IV INFUS W/PUMP: HCPCS

## 2025-02-25 PROCEDURE — 25010000002 FLUOROURACIL PER 500 MG: Performed by: NURSE PRACTITIONER

## 2025-02-25 PROCEDURE — 25010000002 FOSAPREPITANT PER 1 MG: Performed by: NURSE PRACTITIONER

## 2025-02-25 PROCEDURE — 96417 CHEMO IV INFUS EACH ADDL SEQ: CPT

## 2025-02-25 PROCEDURE — 86301 IMMUNOASSAY TUMOR CA 19-9: CPT | Performed by: INTERNAL MEDICINE

## 2025-02-25 PROCEDURE — 25810000003 SODIUM CHLORIDE 0.9 % SOLUTION 500 ML FLEX CONT: Performed by: NURSE PRACTITIONER

## 2025-02-25 PROCEDURE — 96411 CHEMO IV PUSH ADDL DRUG: CPT

## 2025-02-25 PROCEDURE — 96415 CHEMO IV INFUSION ADDL HR: CPT

## 2025-02-25 PROCEDURE — 25010000003 DEXTROSE 5 % SOLUTION 250 ML FLEX CONT: Performed by: NURSE PRACTITIONER

## 2025-02-25 PROCEDURE — 80053 COMPREHEN METABOLIC PANEL: CPT | Performed by: INTERNAL MEDICINE

## 2025-02-25 PROCEDURE — 25010000003 DEXTROSE 5 % SOLUTION: Performed by: NURSE PRACTITIONER

## 2025-02-25 PROCEDURE — 96366 THER/PROPH/DIAG IV INF ADDON: CPT

## 2025-02-25 PROCEDURE — 85025 COMPLETE CBC W/AUTO DIFF WBC: CPT | Performed by: INTERNAL MEDICINE

## 2025-02-25 PROCEDURE — 25010000002 LEUCOVORIN CALCIUM PER 50 MG: Performed by: NURSE PRACTITIONER

## 2025-02-25 PROCEDURE — 25810000003 SODIUM CHLORIDE 0.9 % SOLUTION 250 ML FLEX CONT: Performed by: NURSE PRACTITIONER

## 2025-02-25 PROCEDURE — 96375 TX/PRO/DX INJ NEW DRUG ADDON: CPT

## 2025-02-25 PROCEDURE — 96368 THER/DIAG CONCURRENT INF: CPT

## 2025-02-25 PROCEDURE — 25010000002 OXALIPLATIN PER 0.5 MG: Performed by: NURSE PRACTITIONER

## 2025-02-25 RX ORDER — PALONOSETRON 0.05 MG/ML
0.25 INJECTION, SOLUTION INTRAVENOUS ONCE
Status: COMPLETED | OUTPATIENT
Start: 2025-02-25 | End: 2025-02-25

## 2025-02-25 RX ORDER — FAMOTIDINE 10 MG/ML
20 INJECTION, SOLUTION INTRAVENOUS AS NEEDED
Status: CANCELLED | OUTPATIENT
Start: 2025-02-25

## 2025-02-25 RX ORDER — DEXTROSE MONOHYDRATE 50 MG/ML
20 INJECTION, SOLUTION INTRAVENOUS ONCE
Status: COMPLETED | OUTPATIENT
Start: 2025-02-25 | End: 2025-02-25

## 2025-02-25 RX ORDER — DIPHENHYDRAMINE HYDROCHLORIDE 50 MG/ML
50 INJECTION INTRAMUSCULAR; INTRAVENOUS AS NEEDED
Status: CANCELLED | OUTPATIENT
Start: 2025-02-25

## 2025-02-25 RX ORDER — FLUOROURACIL 50 MG/ML
400 INJECTION, SOLUTION INTRAVENOUS ONCE
Status: COMPLETED | OUTPATIENT
Start: 2025-02-25 | End: 2025-02-25

## 2025-02-25 RX ORDER — HYDROCORTISONE SODIUM SUCCINATE 100 MG/2ML
100 INJECTION INTRAMUSCULAR; INTRAVENOUS AS NEEDED
Status: CANCELLED | OUTPATIENT
Start: 2025-02-25

## 2025-02-25 RX ADMIN — ATROPINE SULFATE 0.25 MG: 0.4 INJECTION, SOLUTION INTRAVENOUS at 11:50

## 2025-02-25 RX ADMIN — DEXAMETHASONE SODIUM PHOSPHATE 12 MG: 10 INJECTION, SOLUTION INTRAMUSCULAR; INTRAVENOUS at 08:54

## 2025-02-25 RX ADMIN — FOSAPREPITANT 100 ML: 150 INJECTION, POWDER, LYOPHILIZED, FOR SOLUTION INTRAVENOUS at 09:12

## 2025-02-25 RX ADMIN — DEXTROSE MONOHYDRATE 20 ML/HR: 50 INJECTION, SOLUTION INTRAVENOUS at 08:52

## 2025-02-25 RX ADMIN — FLUOROURACIL 740 MG: 50 INJECTION, SOLUTION INTRAVENOUS at 13:58

## 2025-02-25 RX ADMIN — LEUCOVORIN CALCIUM 740 MG: 350 INJECTION, POWDER, LYOPHILIZED, FOR SUSPENSION INTRAMUSCULAR; INTRAVENOUS at 11:53

## 2025-02-25 RX ADMIN — PALONOSETRON 0.25 MG: 0.05 INJECTION, SOLUTION INTRAVENOUS at 08:52

## 2025-02-25 RX ADMIN — FLUOROURACIL 4460 MG: 50 INJECTION, SOLUTION INTRAVENOUS at 14:03

## 2025-02-25 RX ADMIN — OXALIPLATIN 160 MG: 5 INJECTION, SOLUTION INTRAVENOUS at 09:46

## 2025-02-25 RX ADMIN — IRINOTECAN HYDROCHLORIDE 280 MG: 20 INJECTION, SOLUTION INTRAVENOUS at 12:25

## 2025-02-25 NOTE — NURSING NOTE
Pt arrived for D1C5 Folfirinox with reports of feeling generally fatigued and after last treatment noticed bloody drainage when he cleaned or blew his nose.  Notified EVA Tatum of the above as well as WBC and ANC remain elevated, Alk Phos 325,  and 4lb wt loss since last taken 2 weeks ago despite pt stating his oral intake has been ok.  Ok to proceed with treatment as planned today per Judi. Pt tolerated today's treatment without incident and discharged in stable condition with 5FU chemo ball securely attached to port and both clamps open.

## 2025-02-27 ENCOUNTER — INFUSION (OUTPATIENT)
Dept: ONCOLOGY | Facility: HOSPITAL | Age: 70
End: 2025-02-27
Payer: MEDICARE

## 2025-02-27 DIAGNOSIS — Z79.899 HIGH RISK MEDICATION USE: ICD-10-CM

## 2025-02-27 DIAGNOSIS — Z45.9 ENCOUNTER FOR MANAGEMENT OF IMPLANTED DEVICE: ICD-10-CM

## 2025-02-27 DIAGNOSIS — C25.9 PANCREATIC ADENOCARCINOMA: Primary | ICD-10-CM

## 2025-02-27 PROCEDURE — 25010000002 HEPARIN LOCK FLUSH PER 10 UNITS: Performed by: NURSE PRACTITIONER

## 2025-02-27 RX ORDER — SODIUM CHLORIDE 0.9 % (FLUSH) 0.9 %
10 SYRINGE (ML) INJECTION AS NEEDED
Status: DISCONTINUED | OUTPATIENT
Start: 2025-02-27 | End: 2025-02-27 | Stop reason: HOSPADM

## 2025-02-27 RX ORDER — HEPARIN SODIUM (PORCINE) LOCK FLUSH IV SOLN 100 UNIT/ML 100 UNIT/ML
500 SOLUTION INTRAVENOUS AS NEEDED
Status: DISCONTINUED | OUTPATIENT
Start: 2025-02-27 | End: 2025-02-27 | Stop reason: HOSPADM

## 2025-02-27 RX ORDER — HEPARIN SODIUM (PORCINE) LOCK FLUSH IV SOLN 100 UNIT/ML 100 UNIT/ML
500 SOLUTION INTRAVENOUS AS NEEDED
OUTPATIENT
Start: 2025-02-27

## 2025-02-27 RX ORDER — SODIUM CHLORIDE 0.9 % (FLUSH) 0.9 %
10 SYRINGE (ML) INJECTION AS NEEDED
OUTPATIENT
Start: 2025-02-27

## 2025-02-27 RX ADMIN — Medication 10 ML: at 13:50

## 2025-02-27 RX ADMIN — Medication 500 UNITS: at 13:51

## 2025-02-28 ENCOUNTER — INFUSION (OUTPATIENT)
Dept: ONCOLOGY | Facility: HOSPITAL | Age: 70
End: 2025-02-28
Payer: MEDICARE

## 2025-02-28 DIAGNOSIS — C25.9 PANCREATIC ADENOCARCINOMA: Primary | ICD-10-CM

## 2025-02-28 DIAGNOSIS — Z79.899 HIGH RISK MEDICATION USE: ICD-10-CM

## 2025-02-28 PROCEDURE — 96372 THER/PROPH/DIAG INJ SC/IM: CPT

## 2025-02-28 PROCEDURE — 25010000002 PEGFILGRASTIM-FPGK 6 MG/0.6ML SOLUTION PREFILLED SYRINGE: Performed by: NURSE PRACTITIONER

## 2025-02-28 RX ADMIN — PEGFLILGRASTIM-FPGK 6 MG: 6 INJECTION, SOLUTION SUBCUTANEOUS at 14:27

## 2025-03-04 ENCOUNTER — TELEPHONE (OUTPATIENT)
Dept: ENDOCRINOLOGY | Age: 70
End: 2025-03-04

## 2025-03-04 ENCOUNTER — DOCUMENTATION (OUTPATIENT)
Dept: ONCOLOGY | Facility: CLINIC | Age: 70
End: 2025-03-04
Payer: MEDICARE

## 2025-03-04 NOTE — PROGRESS NOTES
Please call the patient and tell him Dr. Gutierrez, his surgical oncologist, called me.  He is planning to do surgery and wants him to take a break from chemo to get him the strongest possible before the surgery.    No MD or NP appointments until 4/22/2025.  No chemo appointments until 5/6/2025.    Please cancel chemo and provider visits until 4/22/2025.  Keep that appointment with me but we will not do chemo that day.    Also keep the appointment with me on 5/6/2025 and tentatively keep the appointment for chemo that day.  1 week before that appointment he needs CT chest abdomen and pelvis with contrast, triple phase    Keep all of the subsequent appointments the same (we might change some of the subsequent CT appointments but we can do that later).      Thanks!      Note to self: Dr. Gutierrez is planning resection sometime around April 10 or so.  He states from a surgical standpoint he would be ready to resume the remainder of his chemotherapy roughly 4 to 6 weeks after resection.  I plan to see him on April 22 to discuss the surgical pathology and assess how strong he has and I suspect he will have several questions that day.  Will then tentatively plan to resume chemo on May 6 if the patient is strong enough.  We can of course adjust this if need be.

## 2025-03-04 NOTE — TELEPHONE ENCOUNTER
Hub staff attempted to follow warm transfer process and was unsuccessful     Caller: DR. KO - [facility]    Relationship to patient: Other    Best call back number: [phone]    Patient is needing: DR. KO IS CALLING BACK TO SPEAK WITH DR. HANDY ABOUT A MUTUAL PT ASKED FOR A CALL BACK ASAP

## 2025-03-05 ENCOUNTER — TELEPHONE (OUTPATIENT)
Dept: ONCOLOGY | Facility: CLINIC | Age: 70
End: 2025-03-05

## 2025-03-05 NOTE — TELEPHONE ENCOUNTER
Caller: Brad Bella    Relationship: Self    Best call back number: [phone]     What is the best time to reach you: ANYTIME    Who are you requesting to speak with (clinical staff, provider,  specific staff member):     What was the call regarding: CANCEL PATIENT'S 3/11 APPTS - HE IS WORKING ON SCHEDULING SURGERY AND WILL CALLBACK TO R/S AT A LATER TIME.

## 2025-03-05 NOTE — TELEPHONE ENCOUNTER
Caller: Brad Bella    Relationship: Self    Best call back number: [phone]     What is the best time to reach you: ANYTIME    Who are you requesting to speak with (clinical staff, provider,  specific staff member): CLINICAL    What was the call regarding: PATIENT WANTED TO MAKE SURE DR IBRAHIM WAS IN CONTACT WITH SURGEON DR TRAN REGARDING SURGERY. PATIENT IS CANC 3/11 APPTS FOR NOW AS PER RECOMMENDATION OF SURGEON.     SHOULD PATIENT HAVE HIS PORT FLUSHED OR IS THAT OK?     PLEASE ADVISE.      Please schedule for UROLIFT   Dx: LUTS   Anesthesia : MAC  Time: 30 MIN  Location:   Admit: OUTPT  Clearance/ labs needed: CBC, BMP, UCX, COVID   Special equipment- c-arm/ surgical assist etc: UROLIFT REP

## 2025-03-17 ENCOUNTER — OFFICE VISIT (OUTPATIENT)
Dept: ENDOCRINOLOGY | Age: 70
End: 2025-03-17
Payer: MEDICARE

## 2025-03-17 VITALS
OXYGEN SATURATION: 97 % | SYSTOLIC BLOOD PRESSURE: 136 MMHG | WEIGHT: 137.8 LBS | DIASTOLIC BLOOD PRESSURE: 72 MMHG | BODY MASS INDEX: 19.29 KG/M2 | HEIGHT: 71 IN | HEART RATE: 85 BPM

## 2025-03-17 DIAGNOSIS — Z79.4 TYPE 2 DIABETES MELLITUS WITH HYPERGLYCEMIA, WITH LONG-TERM CURRENT USE OF INSULIN: Primary | ICD-10-CM

## 2025-03-17 DIAGNOSIS — E11.65 TYPE 2 DIABETES MELLITUS WITH HYPERGLYCEMIA, WITH LONG-TERM CURRENT USE OF INSULIN: Primary | ICD-10-CM

## 2025-03-17 RX ORDER — PEN NEEDLE, DIABETIC 32GX 5/32"
1 NEEDLE, DISPOSABLE MISCELLANEOUS 4 TIMES DAILY
Qty: 100 EACH | Refills: 2 | Status: SHIPPED | OUTPATIENT
Start: 2025-03-17

## 2025-03-17 NOTE — PROGRESS NOTES
Chief complaint   Chief Complaint   Patient presents with    Type 2 diabetes mellitus with hyperglycemia, with long-term          Subjective     History of Present Illness:          This is a 69 year old male I am seeing for type 2 DM   referred by PCP   Last OV was 8 weeks ago   He is here for a follow up   other medical history is   1- H/o kidney stones   2- Smoker   3- pancreatic adenocarcinoma, (head of pancreas)  on chemo therapy   , no clear evidence of metastatic disease on CT of the abdomen and pelvis   Pt had T2DM in    Current home medication for diabetes     Tresiba 8 units once a day PM  Novolog with meals 2 units with meals   the A1c was 8.1 in   Checks blood sugar at home using [employer]   Checks blood sugar 1-4 times per day   SBM % in range , the rest is high   pt states that he is now on Creon and decided to stop Chemo and not have surgery for his cancer  trying his best with dietary Compliance, in regards to Exercise, not on a daily basis and his Weight has been the same and there is No Hypoglycemic episodes, there is no AM Headaches /Nightmares, no Polyuria / Polydipsia, and there os no Blurring of Vision, Last Dilated Pupil Exam, in  , no DM in the eye   NoTingling / numbness in feet, No Dizziness / lightheadedness, No Falls  No Nausea, vomiting / Diarrhea  retired.     Latest Reference Range & Units 24 11:42   Hemoglobin A1C 4.8 - 5.6 % 8.1 (H)   Lipase 13 - 78 U/L 93 (H)   (H): Data is abnormally high  Family History   Problem Relation Age of Onset    Diabetes Mother     Heart attack Father     Pancreatic cancer Sister 69     Social History     Socioeconomic History    Marital status:      Spouse name: Debbie   Tobacco Use    Smoking status: Every Day     Types: Pipe    Smokeless tobacco: Never   Vaping Use    Vaping status: Never Used   Substance and Sexual Activity    Alcohol use: Not Currently    Drug use: Never    Sexual activity: Yes     Partners: Female      Birth control/protection: Vasectomy     Past Medical History:   Diagnosis Date    Diabetes mellitus     Kidney stone 09/05/2020    Pancreatic adenocarcinoma      Past Surgical History:   Procedure Laterality Date    URETEROSCOPY LASER LITHOTRIPSY WITH STENT INSERTION Left 09/18/2020    Procedure: LFT URETEROSCOPY LASER LITHOTRIPSY WITH STENT INSERTION;  Surgeon: James Torres MD;  Location: [facility] OR;  Service: Urology;  Laterality: Left;    VASECTOMY         Current Outpatient Medications:     Blood Glucose Monitoring Suppl (ONE TOUCH ULTRA 2) w/Device kit, 1 time daily before breakfast, Disp: 1 each, Rfl: 0    Continuous Glucose Sensor (FreeStyle Cassie 3 Sensor) misc, Use 1 Units Every 14 (Fourteen) Days., Disp: 2 each, Rfl: 2    Glucagon (Gvoke HypoPen 2-Pack) 1 MG/0.2ML solution auto-injector, Inject 1 mg under the skin into the appropriate area as directed Daily As Needed (hypoglycemia)., Disp: 0.4 mL, Rfl: 1    glucose blood test strip, 1 times daily before breakfast, Disp: 100 each, Rfl: 3    insulin aspart (NovoLOG FlexPen) 100 UNIT/ML solution pen-injector sc pen, Inject 2 Units under the skin into the appropriate area as directed 3 (Three) Times a Day With Meals., Disp: 15 mL, Rfl: 1    Insulin Aspart, w/Niacinamide, (Fiasp FlexTouch) 100 UNIT/ML solution pen-injector, Inject 2 Units under the skin into the appropriate area as directed 3 (Three) Times a Day., Disp: 15 mL, Rfl: 2    Insulin Degludec (Tresiba FlexTouch) 200 UNIT/ML solution pen-injector pen injection, Inject 8 Units under the skin into the appropriate area as directed Every Night., Disp: 3.6 mL, Rfl: 3    Insulin Glargine (LANTUS SOLOSTAR) 100 UNIT/ML injection pen, Inject 8 Units under the skin into the appropriate area as directed Daily., Disp: 15 mL, Rfl: 0    Insulin Pen Needle (BD Pen Needle Norma 2nd Gen) 32G X 4 MM misc, Use 1 Units 4 (Four) Times a Day., Disp: 100 each, Rfl: 0    Insulin Pen Needle (BD Pen Needle Norma  U/F) 32G X 4 MM misc, Use 1 Units 4 (Four) Times a Day., Disp: 100 each, Rfl: 1    lidocaine-prilocaine (EMLA) 2.5-2.5 % cream, Apply 1 Application topically to the appropriate area as directed As Needed for Mild Pain or Injection Site Pain., Disp: 30 g, Rfl: 3    ondansetron (ZOFRAN) 8 MG tablet, Take 1 tablet by mouth 3 (Three) Times a Day As Needed for Nausea or Vomiting., Disp: 30 tablet, Rfl: 5    OneTouch UltraSoft 2 Lancets misc, Use 1 each Every Morning Before Breakfast., Disp: 100 each, Rfl: 2    Pancrelipase, Lip-Prot-Amyl, (Creon) 45925-914767 units capsule delayed-release particles capsule, Take 2 capsules by mouth 3 (Three) Times a Day With Meals. And take 2 capsules with snacks., Disp: 500 capsule, Rfl: 1    prochlorperazine (COMPAZINE) 10 MG tablet, Take 1 tablet by mouth Every 6 (Six) Hours As Needed for Nausea or Vomiting., Disp: 90 tablet, Rfl: 5    glucose (B-D GLUCOSE) 5 g chewable tablet, Chew 3 tablets As Needed for Low Blood Sugar. Take 3 tablets for low blood sugar less than 80 (Patient not taking: Reported on 3/17/2025), Disp: 50 tablet, Rfl: 2  Patient has no known allergies.    Objective   Vitals:    03/17/25 1123   BP: 136/72   Pulse: 85   SpO2: 97%            Physical Exam  Neurological:      General: No focal deficit present.      Mental Status: He is alert and oriented to person, place, and time.               Diagnoses and all orders for this visit:    1. Type 2 diabetes mellitus with hyperglycemia, with long-term current use of insulin (Primary)           Assessment:     1- DM, Type 2  New Dx , with High risk secondary to pancreatic disease   labs on file   We discussed the medications  Hypoglycemia and its importance was reviewed   Labs where shown to the patient   2. BP is in good range   3. No lipid panel on file   4. Long term insulin use         Plan:    1. Diet, exercise and weight management  2. Consistent food intake to avoid low blood sugars  3. Home medication includes for  diabetes     Stay on Tresiba 8 units once a day    Fiasp 2 units with meals TID   Start SSI 1/50 above 150 , max is 5 per meals   Will send a rx for Glucagon PRN for low Bg     4. Consistent checking of blood sugars using sensor   5. I reviewed the last progress note  6. Annual eye exam  7. Return in 3 months   8. Was Informed that I will not be addressing his pancreatic cancer and that will be with heme/onc  9. Rx sent to the pharmacy  10. Labs : A1c today       I discussed with the patient/legal representative the risks and the benefits associated with the medications.  The patient/legal representative has been given the opportunity to ask questions.  Alternatives to the proposed treatment (s) were discussed, including the likely results of no treatment.  The patient/legal representative wishes to proceed.       Matt Gatica MD   03/17/25  11:33 EDT

## 2025-03-18 ENCOUNTER — DOCUMENTATION (OUTPATIENT)
Dept: ONCOLOGY | Facility: CLINIC | Age: 70
End: 2025-03-18
Payer: MEDICARE

## 2025-03-18 LAB
ALBUMIN/CREAT UR: 20 MG/G CREAT (ref 0–29)
BUN SERPL-MCNC: 10 MG/DL (ref 8–27)
BUN/CREAT SERPL: 16 (ref 10–24)
CALCIUM SERPL-MCNC: 9.6 MG/DL (ref 8.6–10.2)
CHLORIDE SERPL-SCNC: 99 MMOL/L (ref 96–106)
CO2 SERPL-SCNC: 26 MMOL/L (ref 20–29)
CREAT SERPL-MCNC: 0.61 MG/DL (ref 0.76–1.27)
CREAT UR-MCNC: 106.1 MG/DL
EGFRCR SERPLBLD CKD-EPI 2021: 104 ML/MIN/1.73
GLUCOSE SERPL-MCNC: 165 MG/DL (ref 70–99)
HBA1C MFR BLD: 7.6 % (ref 4.8–5.6)
MICROALBUMIN UR-MCNC: 21 UG/ML
POTASSIUM SERPL-SCNC: 4.7 MMOL/L (ref 3.5–5.2)
SODIUM SERPL-SCNC: 137 MMOL/L (ref 134–144)

## 2025-03-18 NOTE — PROGRESS NOTES
Please call him and remind him, as we have discussed many times before, my recommendation would be to do the surgery for the hope of cure.  Without surgery, we do not believe this could be curable.  However, of course I respect any decision he makes about his health.  If he feels confident he would never want any treatment under any circumstance for pancreatic cancer then he would not necessarily need to do anymore CT scans and if that is the case and he did not want to follow-up with us anymore I suppose that would be reasonable but if that is his choice he would need access to hospice if he begins to develop symptoms.  If he does not want to enroll now that is fine but he probably needs to go ahead and meet with them and get their contact information if he develops symptoms down the road and needs to activate them quickly.  If he wants to maintain follow-up with me and scans with me and is open to more treatment, when this becomes metastatic in the future, we can see if there are any options other than chemotherapy such as targeted therapy or immunotherapy.  This approach would probably require another biopsy down the road when metastatic disease develops to see if he has any additional medical treatment options.  Again, none of those options would be expected to be curable but the hope would be they would help him to live longer and help to hold off on symptoms from pancreatic cancer as long as possible knowing at some point they would stop working since this would be a noncurable cancer (if surgery is not done).  Again, of course, no guarantees for curative surgery is done and this is a difficult cancer to cure.  Just reiterating if surgery is not performed, no chance for cure and therefore the cancer is expected to progress.  The hope of treatment would be to hopefully slow down the progression and we could look for options other than chemo when the cancer progresses if he wants to maintain follow-up with me  with scans      ===View-only below this line===  ----- Message -----  From: Zaida Jones RN  Sent: 3/18/2025   8:44 AM EDT  To: Reed Castillo II, MD  Subject: FW: chemo                                        Dr. Castillo,    Please see message below from patient Brad Bella. He has a follow up appointment with you on 04/22, a CT scan 04/29, and then another appointment with you on 05/06. Please advise if I need to cancel   the CT or anything else. Thanks.  ----- Message -----  From: Yarely Joshua RN  Sent: 3/18/2025   8:07 AM EDT  To: Zaida Jones RN  Subject: FW: chemo                                          ----- Message -----  From: Brad Bella  Sent: 3/17/2025   7:39 PM EDT  To: [facility]  Subject: chemo                                            Judi,  I have decided I will not be doing any more chemo treatments. I am not going to have the surgery. I have scheduled to have the temporary stints replaced with a more permanent stints. After that I    will have the port removed. I will do the CT on April 29th if you feel is necessary. Please cancel the other chemo appointments.  Brad Bella

## 2025-03-19 ENCOUNTER — TELEPHONE (OUTPATIENT)
Dept: ONCOLOGY | Facility: CLINIC | Age: 70
End: 2025-03-19

## 2025-03-19 DIAGNOSIS — C25.9 PANCREATIC ADENOCARCINOMA: Primary | ICD-10-CM

## 2025-03-19 NOTE — TELEPHONE ENCOUNTER
Caller: ARACELY ADAMSON    Best call back number: [phone]    What is the best time to reach you: NO NEED, FYI    Who are you requesting to speak with (clinical staff, provider,  specific staff member): CLINICAL    What was the call regarding: ARACELY STATES THEY RECEIVED A REFERRAL FOR HOSPICE CARE BUT THEY DO NOT SERVE [facility] WHERE PT LIVES.    Is it okay if the provider responds through MyChart: NO

## 2025-04-16 DIAGNOSIS — C25.9 PANCREATIC ADENOCARCINOMA: Primary | ICD-10-CM

## 2025-04-17 DIAGNOSIS — C25.9 PANCREATIC ADENOCARCINOMA: ICD-10-CM

## 2025-05-24 DIAGNOSIS — E11.65 TYPE 2 DIABETES MELLITUS WITH HYPERGLYCEMIA, WITH LONG-TERM CURRENT USE OF INSULIN: ICD-10-CM

## 2025-05-24 DIAGNOSIS — Z79.4 TYPE 2 DIABETES MELLITUS WITH HYPERGLYCEMIA, WITH LONG-TERM CURRENT USE OF INSULIN: ICD-10-CM

## 2025-05-27 RX ORDER — PEN NEEDLE, DIABETIC 32GX 5/32"
NEEDLE, DISPOSABLE MISCELLANEOUS 4 TIMES DAILY
Qty: 100 EACH | Refills: 0 | Status: SHIPPED | OUTPATIENT
Start: 2025-05-27

## 2025-05-27 NOTE — TELEPHONE ENCOUNTER
Rx Refill Note  Requested Prescriptions     Pending Prescriptions Disp Refills    BD Pen Needle Deena 2nd Gen 32G X 4 MM misc [Pharmacy Med Name: BD DEENA PEN NDL [id number] MIS] 100 each 0     Sig: USE AS DIRECTED 4 TIMES DAILY      Last office visit with prescribing clinician: 3/17/2025   Last telemedicine visit with prescribing clinician: Visit date not found   Next office visit with prescribing clinician: 6/17/2025                         Would you like a call back once the refill request has been completed: [] Yes [] No    If the office needs to give you a call back, can they leave a voicemail: [] Yes [] No    Kelsy Heck MA  05/27/25, 08:07 EDT

## 2025-06-04 DIAGNOSIS — E11.9 DIABETES MELLITUS, NEW ONSET: ICD-10-CM

## 2025-06-04 NOTE — TELEPHONE ENCOUNTER
Rx Refill Note  Requested Prescriptions     Pending Prescriptions Disp Refills    glucose blood test strip 100 each 3     Si times daily before breakfast      Last office visit with prescribing clinician: 12/10/2024   Last telemedicine visit with prescribing clinician: Visit date not found   Next office visit with prescribing clinician: Visit date not found     Please advise this refill

## 2025-06-17 ENCOUNTER — OFFICE VISIT (OUTPATIENT)
Dept: ENDOCRINOLOGY | Age: 70
End: 2025-06-17
Payer: MEDICARE

## 2025-06-17 VITALS
BODY MASS INDEX: 19.21 KG/M2 | DIASTOLIC BLOOD PRESSURE: 70 MMHG | HEART RATE: 76 BPM | HEIGHT: 71 IN | OXYGEN SATURATION: 100 % | SYSTOLIC BLOOD PRESSURE: 144 MMHG | WEIGHT: 137.2 LBS

## 2025-06-17 DIAGNOSIS — Z72.0 TOBACCO USE: ICD-10-CM

## 2025-06-17 DIAGNOSIS — Z79.4 TYPE 2 DIABETES MELLITUS WITH HYPERGLYCEMIA, WITH LONG-TERM CURRENT USE OF INSULIN: Primary | ICD-10-CM

## 2025-06-17 DIAGNOSIS — E11.65 TYPE 2 DIABETES MELLITUS WITH HYPERGLYCEMIA, WITH LONG-TERM CURRENT USE OF INSULIN: Primary | ICD-10-CM

## 2025-06-17 RX ORDER — HYDROCHLOROTHIAZIDE 12.5 MG/1
CAPSULE ORAL
Qty: 2 EACH | Refills: 2 | Status: SHIPPED | OUTPATIENT
Start: 2025-06-17

## 2025-06-17 RX ORDER — INSULIN ASPART 100 [IU]/ML
2 INJECTION, SOLUTION INTRAVENOUS; SUBCUTANEOUS
Qty: 15 ML | Refills: 1 | Status: SHIPPED | OUTPATIENT
Start: 2025-06-17

## 2025-06-17 RX ORDER — PEN NEEDLE, DIABETIC 32GX 5/32"
1 NEEDLE, DISPOSABLE MISCELLANEOUS 4 TIMES DAILY
Qty: 100 EACH | Refills: 2 | Status: SHIPPED | OUTPATIENT
Start: 2025-06-17

## 2025-06-17 NOTE — PROGRESS NOTES
Chief complaint   Chief Complaint   Patient presents with    Diabetes          Subjective     History of Present Illness:          This is a 69 year old male I am seeing for type 2 DM   referred by PCP   Last OV was 12 weeks ago   He is here for a follow up   other medical history is   1- H/o kidney stones   2- Smoker   3- pancreatic adenocarcinoma, (head of pancreas)  on chemo therapy   , no clear evidence of metastatic disease on CT of the abdomen and pelvis   Pt had T2DM in    Current home medication for diabetes     Lantus  8 units once a day PM  Novolog with meals 2 units with meals + SSI   the A1c was 8.1 in   the A1c was 7.6 in 3-2025  Checks blood sugar at home using [employer] Cassie 3 +  Checks blood sugar 1-4 times per day   SBM % in range , the rest is high , manily in night as he eats ice cream   pt states that he is now on Creon and decided to stop Chemo and not have surgery for his cancer  trying his best with dietary Compliance, in regards to Exercise, not on a daily basis and his Weight has been the same and there is No Hypoglycemic episodes, there is no AM Headaches /Nightmares, no Polyuria / Polydipsia, and there os no Blurring of Vision, Last Dilated Pupil Exam, in  , no DM in the eye   NoTingling / numbness in feet, No Dizziness / lightheadedness, No Falls  No Nausea, vomiting / Diarrhea  retired.   Latest Reference Range & Units 25 11:58   Hemoglobin A1C 4.8 - 5.6 % 7.6 (H)   (H): Data is abnormally high       Latest Reference Range & Units 24 11:42   Hemoglobin A1C 4.8 - 5.6 % 8.1 (H)   Lipase 13 - 78 U/L 93 (H)   (H): Data is abnormally high  Family History   Problem Relation Age of Onset    Diabetes Mother     Heart attack Father     Pancreatic cancer Sister 69     Social History     Socioeconomic History    Marital status:      Spouse name: Debbie   Tobacco Use    Smoking status: Every Day     Types: Pipe    Smokeless tobacco: Never   Vaping Use    Vaping  status: Never Used   Substance and Sexual Activity    Alcohol use: Not Currently    Drug use: Never    Sexual activity: Yes     Partners: Female     Birth control/protection: Vasectomy     Past Medical History:   Diagnosis Date    Diabetes mellitus     Kidney stone 09/05/2020    Pancreatic adenocarcinoma      Past Surgical History:   Procedure Laterality Date    URETEROSCOPY LASER LITHOTRIPSY WITH STENT INSERTION Left 09/18/2020    Procedure: LFT URETEROSCOPY LASER LITHOTRIPSY WITH STENT INSERTION;  Surgeon: James Torres MD;  Location: [facility];  Service: Urology;  Laterality: Left;    VASECTOMY         Current Outpatient Medications:     Blood Glucose Monitoring Suppl (ONE TOUCH ULTRA 2) w/Device kit, 1 time daily before breakfast, Disp: 1 each, Rfl: 0    Continuous Glucose Sensor (FreeStyle Cassie 3 Sensor) misc, Use 1 Units Every 14 (Fourteen) Days., Disp: 2 each, Rfl: 2    Glucagon (Gvoke HypoPen 2-Pack) 1 MG/0.2ML solution auto-injector, Inject 1 mg under the skin into the appropriate area as directed Daily As Needed (hypoglycemia)., Disp: 0.4 mL, Rfl: 1    glucose blood test strip, 1 times daily before breakfast, Disp: 100 each, Rfl: 3    insulin aspart (NovoLOG FlexPen) 100 UNIT/ML solution pen-injector sc pen, Inject 2 Units under the skin into the appropriate area as directed 3 (Three) Times a Day With Meals., Disp: 15 mL, Rfl: 1    Insulin Aspart, w/Niacinamide, (Fiasp FlexTouch) 100 UNIT/ML solution pen-injector, Inject 2 Units under the skin into the appropriate area as directed 3 (Three) Times a Day., Disp: 15 mL, Rfl: 2    Insulin Glargine (LANTUS SOLOSTAR) 100 UNIT/ML injection pen, Inject 8 Units under the skin into the appropriate area as directed Daily., Disp: 15 mL, Rfl: 2    Insulin Pen Needle (BD Pen Needle Norma 2nd Gen) 32G X 4 MM misc, Use 1 Units 4 (Four) Times a Day., Disp: 100 each, Rfl: 0    Insulin Pen Needle (BD Pen Needle Norma 2nd Gen) 32G X 4 MM misc, Inject 1 Units  under the skin into the appropriate area as directed 4 (Four) Times a Day. use as directed, Disp: 100 each, Rfl: 2    OneTouch UltraSoft 2 Lancets misc, Use 1 each Every Morning Before Breakfast., Disp: 100 each, Rfl: 2    Pancrelipase, Lip-Prot-Amyl, (Creon) 36401-246638 units capsule delayed-release particles capsule, Take 2 capsules by mouth 3 (Three) Times a Day With Meals. And take 2 capsules with snacks., Disp: 500 capsule, Rfl: 2    glucose (B-D GLUCOSE) 5 g chewable tablet, Chew 3 tablets As Needed for Low Blood Sugar. Take 3 tablets for low blood sugar less than 80 (Patient not taking: Reported on 12/10/2024), Disp: 50 tablet, Rfl: 2    lidocaine-prilocaine (EMLA) 2.5-2.5 % cream, Apply 1 Application topically to the appropriate area as directed As Needed for Mild Pain or Injection Site Pain. (Patient not taking: Reported on 6/17/2025), Disp: 30 g, Rfl: 3    ondansetron (ZOFRAN) 8 MG tablet, Take 1 tablet by mouth 3 (Three) Times a Day As Needed for Nausea or Vomiting. (Patient not taking: Reported on 6/17/2025), Disp: 30 tablet, Rfl: 5    prochlorperazine (COMPAZINE) 10 MG tablet, Take 1 tablet by mouth Every 6 (Six) Hours As Needed for Nausea or Vomiting. (Patient not taking: Reported on 6/17/2025), Disp: 90 tablet, Rfl: 5  Patient has no known allergies.    Objective   Vitals:    06/17/25 1242   BP: 144/70   Pulse: 76   SpO2: 100%            Physical Exam  Neurological:      General: No focal deficit present.      Mental Status: He is alert and oriented to person, place, and time.               Diagnoses and all orders for this visit:    1. Type 2 diabetes mellitus with hyperglycemia, with long-term current use of insulin (Primary)  -     Insulin Pen Needle (BD Pen Needle Norma 2nd Gen) 32G X 4 MM misc; Inject 1 Units under the skin into the appropriate area as directed 4 (Four) Times a Day. use as directed  Dispense: 100 each; Refill: 2  -     Insulin Glargine (LANTUS SOLOSTAR) 100 UNIT/ML injection pen;  Inject 8 Units under the skin into the appropriate area as directed Daily.  Dispense: 15 mL; Refill: 2  -     insulin aspart (NovoLOG FlexPen) 100 UNIT/ML solution pen-injector sc pen; Inject 2 Units under the skin into the appropriate area as directed 3 (Three) Times a Day With Meals.  Dispense: 15 mL; Refill: 1             Assessment:     1- DM, Type 2   with High risk secondary to pancreatic disease   labs on file   We discussed the medications  Hypoglycemia and its importance was reviewed   Labs where shown to the patient   2. BP is in good range   3. No lipid panel on file   4. Long term insulin use         Plan:    1. Diet, exercise and weight management  2. Consistent food intake to avoid low blood sugars  3. Home medication includes for diabetes     Lantus 8 8 units once a day   Novolog 2 units with meals TID   + SSI 1/50 above 150 , max is 5 per meals   Will send a rx for Glucagon PRN for low Bg     4. Consistent checking of blood sugars using sensor Cassie 3+  5. I reviewed the last progress note  6. Annual eye exam  7. Return in 3 months   8. Was Informed that I will not be addressing his pancreatic cancer and that will be with heme/onc  9. Rx sent to the pharmacy  10. Labs : A1c today       I discussed with the patient/legal representative the risks and the benefits associated with the medications.  The patient/legal representative has been given the opportunity to ask questions.  Alternatives to the proposed treatment (s) were discussed, including the likely results of no treatment.  The patient/legal representative wishes to proceed.       Matt Gatica MD   06/17/25  13:03 EDT

## 2025-06-18 ENCOUNTER — PRIOR AUTHORIZATION (OUTPATIENT)
Dept: ENDOCRINOLOGY | Age: 70
End: 2025-06-18
Payer: MEDICARE

## 2025-06-18 LAB — HBA1C MFR BLD: 7.7 % (ref 4.8–5.6)

## 2025-06-19 ENCOUNTER — TREATMENT (OUTPATIENT)
Dept: ENDOCRINOLOGY | Age: 70
End: 2025-06-19
Payer: COMMERCIAL

## 2025-06-19 ENCOUNTER — PRIOR AUTHORIZATION (OUTPATIENT)
Dept: ENDOCRINOLOGY | Age: 70
End: 2025-06-19
Payer: MEDICARE

## 2025-07-16 ENCOUNTER — OFFICE VISIT (OUTPATIENT)
Dept: INTERNAL MEDICINE | Facility: CLINIC | Age: 70
End: 2025-07-16
Payer: MEDICARE

## 2025-07-16 VITALS
DIASTOLIC BLOOD PRESSURE: 70 MMHG | WEIGHT: 133.8 LBS | HEART RATE: 69 BPM | TEMPERATURE: 98.1 F | BODY MASS INDEX: 18.73 KG/M2 | SYSTOLIC BLOOD PRESSURE: 144 MMHG | HEIGHT: 71 IN

## 2025-07-16 DIAGNOSIS — E11.65 TYPE 2 DIABETES MELLITUS WITH HYPERGLYCEMIA, WITH LONG-TERM CURRENT USE OF INSULIN: ICD-10-CM

## 2025-07-16 DIAGNOSIS — Z79.4 TYPE 2 DIABETES MELLITUS WITH HYPERGLYCEMIA, WITH LONG-TERM CURRENT USE OF INSULIN: ICD-10-CM

## 2025-07-16 DIAGNOSIS — C25.9 PANCREATIC ADENOCARCINOMA: Primary | ICD-10-CM

## 2025-07-16 NOTE — PROGRESS NOTES
Chief Complaint  Follow-up    Subjective         Brad Bella presents to [facility]  History of Present Illness  70-year-old male with pancreatic adenocarcinoma and type 2 diabetes presents today for routine follow-up.      Pancreatic adenocarcinoma: Patient was following with oncology and surgical oncology.  He was recommended Whipple surgery and continuing chemo.  Patient states he was told his cure rate was only 10% and he refused the surgery and refused to continue chemo.    Patient is currently following with hospice, they met with him 2 days ago and they extended his hospice care.     Patient states overall he is feeling fine but he just cannot gain weight.      Patient is interested in experimental trial at [facility], patient states the results are promising.  Patient also asking if ivermectin and albendazole provides any benefit like he read online.    Patient states he has good appetite, and denies pain    CT abdomen pelvis 2/11/2025 showed decrease in pancreatic head size, PET scan 12/20/2024 no clear evidence of liver metastasis or retroperitoneal adenopathy or intra-abdominal or pelvic metastasis    Type 2 diabetes: Managed by endocrinology, is currently on basal and mealtime insulin.  History of Present Illness           Objective     Review of Systems     Past Medical History:   Diagnosis Date    Diabetes mellitus     Kidney stone 09/05/2020    Pancreatic adenocarcinoma         Current Outpatient Medications:     Blood Glucose Monitoring Suppl (ONE TOUCH ULTRA 2) w/Device kit, 1 time daily before breakfast, Disp: 1 each, Rfl: 0    Continuous Glucose Sensor (FreeStyle Cassie 3 Plus Sensor), Every 15 days, Disp: 2 each, Rfl: 2    Glucagon (Gvoke HypoPen 2-Pack) 1 MG/0.2ML solution auto-injector, Inject 1 mg under the skin into the appropriate area as directed Daily As Needed (hypoglycemia)., Disp: 0.4 mL, Rfl: 1    glucose blood test strip, 1 times  daily before breakfast, Disp: 100 each, Rfl: 3    insulin aspart (NovoLOG FlexPen) 100 UNIT/ML solution pen-injector sc pen, Inject 2 Units under the skin into the appropriate area as directed 3 (Three) Times a Day With Meals., Disp: 15 mL, Rfl: 1    Insulin Glargine (LANTUS SOLOSTAR) 100 UNIT/ML injection pen, Inject 8 Units under the skin into the appropriate area as directed Daily., Disp: 15 mL, Rfl: 2    Insulin Pen Needle (BD Pen Needle Norma 2nd Gen) 32G X 4 MM misc, Use 1 Units 4 (Four) Times a Day., Disp: 100 each, Rfl: 0    OneTouch UltraSoft 2 Lancets misc, Use 1 each Every Morning Before Breakfast., Disp: 100 each, Rfl: 2    Pancrelipase, Lip-Prot-Amyl, (Creon) 86885-838690 units capsule delayed-release particles capsule, Take 2 capsules by mouth 3 (Three) Times a Day With Meals. And take 2 capsules with snacks., Disp: 500 capsule, Rfl: 2    glucose (B-D GLUCOSE) 5 g chewable tablet, Chew 3 tablets As Needed for Low Blood Sugar. Take 3 tablets for low blood sugar less than 80 (Patient not taking: Reported on 7/16/2025), Disp: 50 tablet, Rfl: 2    Insulin Aspart, w/Niacinamide, (Fiasp FlexTouch) 100 UNIT/ML solution pen-injector, Inject 2 Units under the skin into the appropriate area as directed 3 (Three) Times a Day. (Patient not taking: Reported on 7/16/2025), Disp: 15 mL, Rfl: 2    Insulin Pen Needle (BD Pen Needle Norma 2nd Gen) 32G X 4 MM misc, Inject 1 Units under the skin into the appropriate area as directed 4 (Four) Times a Day. use as directed (Patient not taking: Reported on 7/16/2025), Disp: 100 each, Rfl: 2    lidocaine-prilocaine (EMLA) 2.5-2.5 % cream, Apply 1 Application topically to the appropriate area as directed As Needed for Mild Pain or Injection Site Pain. (Patient not taking: Reported on 7/16/2025), Disp: 30 g, Rfl: 3    ondansetron (ZOFRAN) 8 MG tablet, Take 1 tablet by mouth 3 (Three) Times a Day As Needed for Nausea or Vomiting. (Patient not taking: Reported on 7/16/2025), Disp:  "30 tablet, Rfl: 5    prochlorperazine (COMPAZINE) 10 MG tablet, Take 1 tablet by mouth Every 6 (Six) Hours As Needed for Nausea or Vomiting. (Patient not taking: Reported on 7/16/2025), Disp: 90 tablet, Rfl: 5   Social History     Socioeconomic History    Marital status:      Spouse name: Debbie   Tobacco Use    Smoking status: Every Day     Current packs/day: 0.25     Average packs/day: 0.3 packs/day for 55.0 years (13.8 ttl pk-yrs)     Types: Pipe, Cigarettes    Smokeless tobacco: Never   Vaping Use    Vaping status: Never Used   Substance and Sexual Activity    Alcohol use: Not Currently    Drug use: Never    Sexual activity: Yes     Partners: Female     Birth control/protection: Vasectomy      Vital Signs:   /70 (BP Location: Right arm, Patient Position: Sitting, Cuff Size: Adult)   Pulse 69   Temp 98.1 °F (36.7 °C) (Infrared)   Ht 180 cm (70.87\")   Wt 60.7 kg (133 lb 12.8 oz)   BMI 18.73 kg/m²       Physical Exam  Constitutional:       General: He is not in acute distress.     Appearance: He is not toxic-appearing.   Cardiovascular:      Rate and Rhythm: Normal rate.   Pulmonary:      Effort: Pulmonary effort is normal. No respiratory distress.   Neurological:      Mental Status: He is alert.                Result Review :              CT Abdomen Pelvis With & Without Contrast (02/18/2025 13:14)     NM PET/CT Skull Base to Mid Thigh (12/10/2024 14:12)          Assessment and Plan    Diagnoses and all orders for this visit:    1. Pancreatic adenocarcinoma (Primary)    2. Type 2 diabetes mellitus with hyperglycemia, with long-term current use of insulin      I had a long discussion with the patient and given that there is no evidence of metastasis and decrease of the tumor size in the head of the pancreas after chemotherapy, I encouraged him to discuss with his oncologist and surgeon about his long-term survival after his surgery, however, he is not willing to consider Whipple surgery even if " long-term survival was 5 years because he is very concerned about the complication and quality of life.    His weight is dropping, I recommended continuing hospice care.    Patient is interested in drug trial happening at [facility], I encouraged him to call them and inquire more information about the trial    Patient should continue following with endocrinology regarding his diabetes.      I spent 45 minutes caring for Brad on this date of service. This time includes time spent by me in the following activities:preparing for the visit, reviewing tests, counseling and educating the patient/family/caregiver, and documenting information in the medical record  Follow Up   No follow-ups on file.  Patient was given instructions and counseling regarding his condition or for health maintenance advice. Please see specific information pulled into the AVS if appropriate.       EMR Dragon/Transcription disclaimer:   Much of this encounter note is an electronic transcription and translation of spoken language to printed text. The electronic translation of spoken language may permit erroneous, or at times, nonsensical words or phrases to be inadvertently transcribed; Although I have reviewed the note for such errors, some may still exist.

## 2025-08-26 DIAGNOSIS — E11.65 TYPE 2 DIABETES MELLITUS WITH HYPERGLYCEMIA, WITH LONG-TERM CURRENT USE OF INSULIN: ICD-10-CM

## 2025-08-26 DIAGNOSIS — Z79.4 TYPE 2 DIABETES MELLITUS WITH HYPERGLYCEMIA, WITH LONG-TERM CURRENT USE OF INSULIN: ICD-10-CM

## 2025-08-26 RX ORDER — PEN NEEDLE, DIABETIC 32GX 5/32"
NEEDLE, DISPOSABLE MISCELLANEOUS 4 TIMES DAILY
Qty: 100 EACH | Refills: 0 | Status: SHIPPED | OUTPATIENT
Start: 2025-08-26

## (undated) DEVICE — URETERAL DILATATION SYSTEM

## (undated) DEVICE — TIDISHIELD UROLOGY DRAIN BAGS FROSTY VINYL STERILE FITS SIEMENS UROSKOP ACCESS 20 PER CASE: Brand: TIDISHIELD

## (undated) DEVICE — PK URETSCP 40

## (undated) DEVICE — FIBR LASR HOLMIUM ACCUMAX 365 1PT USE

## (undated) DEVICE — GLV SURG BIOGEL LTX PF 7

## (undated) DEVICE — PRT BIOP SEALS

## (undated) DEVICE — NITINOL STONE RETRIEVAL BASKET: Brand: ZERO TIP